# Patient Record
Sex: FEMALE | Race: WHITE | NOT HISPANIC OR LATINO | Employment: UNEMPLOYED | URBAN - METROPOLITAN AREA
[De-identification: names, ages, dates, MRNs, and addresses within clinical notes are randomized per-mention and may not be internally consistent; named-entity substitution may affect disease eponyms.]

---

## 2017-01-08 ENCOUNTER — HOSPITAL ENCOUNTER (EMERGENCY)
Facility: HOSPITAL | Age: 22
Discharge: HOME | End: 2017-01-08
Attending: EMERGENCY MEDICINE | Admitting: EMERGENCY MEDICINE
Payer: COMMERCIAL

## 2017-01-08 VITALS
TEMPERATURE: 97.1 F | SYSTOLIC BLOOD PRESSURE: 123 MMHG | HEART RATE: 79 BPM | RESPIRATION RATE: 18 BRPM | WEIGHT: 170 LBS | OXYGEN SATURATION: 96 % | DIASTOLIC BLOOD PRESSURE: 68 MMHG

## 2017-01-08 DIAGNOSIS — R10.11 RUQ ABDOMINAL PAIN: Primary | ICD-10-CM

## 2017-01-08 DIAGNOSIS — K80.50 BILIARY COLIC: ICD-10-CM

## 2017-01-08 DIAGNOSIS — K80.20 CALCULUS OF GALLBLADDER WITHOUT CHOLECYSTITIS WITHOUT OBSTRUCTION: ICD-10-CM

## 2017-01-08 LAB
ALBUMIN SERPL BCP-MCNC: 3.4 G/DL (ref 3.5–5)
ALP SERPL-CCNC: 74 U/L (ref 46–116)
ALT SERPL W P-5'-P-CCNC: 37 U/L (ref 12–78)
ANION GAP SERPL CALCULATED.3IONS-SCNC: 7 MMOL/L (ref 4–13)
AST SERPL W P-5'-P-CCNC: 26 U/L (ref 5–45)
BACTERIA UR QL AUTO: ABNORMAL /HPF
BASOPHILS # BLD AUTO: 0.03 THOUSANDS/ΜL (ref 0–0.1)
BASOPHILS NFR BLD AUTO: 0 % (ref 0–1)
BILIRUB SERPL-MCNC: 0.23 MG/DL (ref 0.2–1)
BILIRUB UR QL STRIP: NEGATIVE
BUN SERPL-MCNC: 18 MG/DL (ref 5–25)
CALCIUM SERPL-MCNC: 8.7 MG/DL (ref 8.3–10.1)
CHLORIDE SERPL-SCNC: 103 MMOL/L (ref 100–108)
CLARITY UR: ABNORMAL
CO2 SERPL-SCNC: 28 MMOL/L (ref 21–32)
COLOR UR: YELLOW
COLOR, POC: NORMAL
CREAT SERPL-MCNC: 0.68 MG/DL (ref 0.6–1.3)
EOSINOPHIL # BLD AUTO: 0.23 THOUSAND/ΜL (ref 0–0.61)
EOSINOPHIL NFR BLD AUTO: 3 % (ref 0–6)
ERYTHROCYTE [DISTWIDTH] IN BLOOD BY AUTOMATED COUNT: 12.2 % (ref 11.6–15.1)
GFR SERPL CREATININE-BSD FRML MDRD: >60 ML/MIN/1.73SQ M
GLUCOSE SERPL-MCNC: 101 MG/DL (ref 65–140)
GLUCOSE UR STRIP-MCNC: NEGATIVE MG/DL
HCG UR QL: NEGATIVE
HCT VFR BLD AUTO: 41.8 % (ref 34.8–46.1)
HGB BLD-MCNC: 14.2 G/DL (ref 11.5–15.4)
HGB UR QL STRIP.AUTO: ABNORMAL
KETONES UR STRIP-MCNC: NEGATIVE MG/DL
LEUKOCYTE ESTERASE UR QL STRIP: NEGATIVE
LIPASE SERPL-CCNC: 121 U/L (ref 73–393)
LYMPHOCYTES # BLD AUTO: 1.5 THOUSANDS/ΜL (ref 0.6–4.47)
LYMPHOCYTES NFR BLD AUTO: 20 % (ref 14–44)
MCH RBC QN AUTO: 29.3 PG (ref 26.8–34.3)
MCHC RBC AUTO-ENTMCNC: 34 G/DL (ref 31.4–37.4)
MCV RBC AUTO: 86 FL (ref 82–98)
MONOCYTES # BLD AUTO: 0.38 THOUSAND/ΜL (ref 0.17–1.22)
MONOCYTES NFR BLD AUTO: 5 % (ref 4–12)
NEUTROPHILS # BLD AUTO: 5.32 THOUSANDS/ΜL (ref 1.85–7.62)
NEUTS SEG NFR BLD AUTO: 72 % (ref 43–75)
NITRITE UR QL STRIP: NEGATIVE
NON-SQ EPI CELLS URNS QL MICRO: ABNORMAL /HPF
NRBC BLD AUTO-RTO: 0 /100 WBCS
PH UR STRIP.AUTO: 7 [PH] (ref 4.5–8)
PLATELET # BLD AUTO: 274 THOUSANDS/UL (ref 149–390)
PMV BLD AUTO: 9.8 FL (ref 8.9–12.7)
POTASSIUM SERPL-SCNC: 3.7 MMOL/L (ref 3.5–5.3)
PROT SERPL-MCNC: 7.2 G/DL (ref 6.4–8.2)
PROT UR STRIP-MCNC: NEGATIVE MG/DL
RBC # BLD AUTO: 4.85 MILLION/UL (ref 3.81–5.12)
RBC #/AREA URNS AUTO: ABNORMAL /HPF
SODIUM SERPL-SCNC: 138 MMOL/L (ref 136–145)
SP GR UR STRIP.AUTO: 1.02 (ref 1–1.03)
UROBILINOGEN UR QL STRIP.AUTO: 0.2 E.U./DL
WBC # BLD AUTO: 7.47 THOUSAND/UL (ref 4.31–10.16)
WBC #/AREA URNS AUTO: ABNORMAL /HPF

## 2017-01-08 PROCEDURE — 87086 URINE CULTURE/COLONY COUNT: CPT

## 2017-01-08 PROCEDURE — 80053 COMPREHEN METABOLIC PANEL: CPT | Performed by: EMERGENCY MEDICINE

## 2017-01-08 PROCEDURE — 96374 THER/PROPH/DIAG INJ IV PUSH: CPT

## 2017-01-08 PROCEDURE — 83690 ASSAY OF LIPASE: CPT | Performed by: EMERGENCY MEDICINE

## 2017-01-08 PROCEDURE — 36415 COLL VENOUS BLD VENIPUNCTURE: CPT | Performed by: EMERGENCY MEDICINE

## 2017-01-08 PROCEDURE — 96361 HYDRATE IV INFUSION ADD-ON: CPT

## 2017-01-08 PROCEDURE — 99284 EMERGENCY DEPT VISIT MOD MDM: CPT

## 2017-01-08 PROCEDURE — 81025 URINE PREGNANCY TEST: CPT | Performed by: EMERGENCY MEDICINE

## 2017-01-08 PROCEDURE — 85025 COMPLETE CBC W/AUTO DIFF WBC: CPT | Performed by: EMERGENCY MEDICINE

## 2017-01-08 PROCEDURE — 81001 URINALYSIS AUTO W/SCOPE: CPT

## 2017-01-08 PROCEDURE — 81002 URINALYSIS NONAUTO W/O SCOPE: CPT | Performed by: EMERGENCY MEDICINE

## 2017-01-08 PROCEDURE — 96375 TX/PRO/DX INJ NEW DRUG ADDON: CPT

## 2017-01-08 RX ORDER — KETOROLAC TROMETHAMINE 30 MG/ML
15 INJECTION, SOLUTION INTRAMUSCULAR; INTRAVENOUS ONCE
Status: COMPLETED | OUTPATIENT
Start: 2017-01-08 | End: 2017-01-08

## 2017-01-08 RX ORDER — OXYCODONE HYDROCHLORIDE AND ACETAMINOPHEN 5; 325 MG/1; MG/1
1 TABLET ORAL EVERY 4 HOURS PRN
Qty: 10 TABLET | Refills: 0 | Status: SHIPPED | OUTPATIENT
Start: 2017-01-08 | End: 2017-08-30 | Stop reason: ALTCHOICE

## 2017-01-08 RX ORDER — ONDANSETRON 4 MG/1
4 TABLET, FILM COATED ORAL EVERY 8 HOURS PRN
Qty: 20 TABLET | Refills: 0 | Status: SHIPPED | OUTPATIENT
Start: 2017-01-08 | End: 2017-08-30 | Stop reason: ALTCHOICE

## 2017-01-08 RX ORDER — ONDANSETRON 2 MG/ML
4 INJECTION INTRAMUSCULAR; INTRAVENOUS ONCE
Status: COMPLETED | OUTPATIENT
Start: 2017-01-08 | End: 2017-01-08

## 2017-01-08 RX ADMIN — KETOROLAC TROMETHAMINE 15 MG: 30 INJECTION, SOLUTION INTRAMUSCULAR at 03:20

## 2017-01-08 RX ADMIN — SODIUM CHLORIDE 1000 ML: 0.9 INJECTION, SOLUTION INTRAVENOUS at 03:13

## 2017-01-08 RX ADMIN — ONDANSETRON 4 MG: 2 INJECTION INTRAMUSCULAR; INTRAVENOUS at 03:19

## 2017-01-09 LAB — BACTERIA UR CULT: NORMAL

## 2017-08-15 ENCOUNTER — APPOINTMENT (OUTPATIENT)
Dept: RADIOLOGY | Facility: MEDICAL CENTER | Age: 22
End: 2017-08-15
Attending: PHYSICIAN ASSISTANT
Payer: COMMERCIAL

## 2017-08-15 ENCOUNTER — OFFICE VISIT (OUTPATIENT)
Dept: URGENT CARE | Facility: MEDICAL CENTER | Age: 22
End: 2017-08-15
Payer: COMMERCIAL

## 2017-08-15 DIAGNOSIS — M79.672 PAIN OF LEFT FOOT: ICD-10-CM

## 2017-08-15 PROCEDURE — 99213 OFFICE O/P EST LOW 20 MIN: CPT

## 2017-08-15 PROCEDURE — 73610 X-RAY EXAM OF ANKLE: CPT

## 2017-08-15 PROCEDURE — 73630 X-RAY EXAM OF FOOT: CPT

## 2017-08-30 ENCOUNTER — HOSPITAL ENCOUNTER (INPATIENT)
Facility: HOSPITAL | Age: 22
LOS: 1 days | Discharge: HOME | DRG: 419 | End: 2017-08-31
Attending: EMERGENCY MEDICINE | Admitting: SURGERY
Payer: COMMERCIAL

## 2017-08-30 ENCOUNTER — APPOINTMENT (INPATIENT)
Dept: MRI IMAGING | Facility: HOSPITAL | Age: 22
DRG: 419 | End: 2017-08-30
Payer: COMMERCIAL

## 2017-08-30 ENCOUNTER — ANESTHESIA EVENT (INPATIENT)
Dept: PERIOP | Facility: HOSPITAL | Age: 22
DRG: 419 | End: 2017-08-30
Payer: COMMERCIAL

## 2017-08-30 ENCOUNTER — ANESTHESIA (INPATIENT)
Dept: PERIOP | Facility: HOSPITAL | Age: 22
DRG: 419 | End: 2017-08-30
Payer: COMMERCIAL

## 2017-08-30 ENCOUNTER — APPOINTMENT (EMERGENCY)
Dept: ULTRASOUND IMAGING | Facility: HOSPITAL | Age: 22
DRG: 419 | End: 2017-08-30
Payer: COMMERCIAL

## 2017-08-30 DIAGNOSIS — K80.70 CHOLELITHIASIS WITH CHOLEDOCHOLITHIASIS: Primary | ICD-10-CM

## 2017-08-30 LAB
ALBUMIN SERPL BCP-MCNC: 3.4 G/DL (ref 3.5–5)
ALP SERPL-CCNC: 75 U/L (ref 46–116)
ALT SERPL W P-5'-P-CCNC: 44 U/L (ref 12–78)
AMORPH URATE CRY URNS QL MICRO: ABNORMAL /HPF
ANION GAP SERPL CALCULATED.3IONS-SCNC: 9 MMOL/L (ref 4–13)
AST SERPL W P-5'-P-CCNC: 29 U/L (ref 5–45)
BACTERIA UR QL AUTO: ABNORMAL /HPF
BASOPHILS # BLD AUTO: 0.05 THOUSANDS/ΜL (ref 0–0.1)
BASOPHILS NFR BLD AUTO: 1 % (ref 0–1)
BILIRUB SERPL-MCNC: 0.3 MG/DL (ref 0.2–1)
BILIRUB UR QL STRIP: NEGATIVE
BUN SERPL-MCNC: 13 MG/DL (ref 5–25)
CALCIUM SERPL-MCNC: 8.6 MG/DL (ref 8.3–10.1)
CHLORIDE SERPL-SCNC: 104 MMOL/L (ref 100–108)
CLARITY UR: ABNORMAL
CO2 SERPL-SCNC: 28 MMOL/L (ref 21–32)
COLOR UR: YELLOW
CREAT SERPL-MCNC: 0.79 MG/DL (ref 0.6–1.3)
EOSINOPHIL # BLD AUTO: 0.29 THOUSAND/ΜL (ref 0–0.61)
EOSINOPHIL NFR BLD AUTO: 6 % (ref 0–6)
ERYTHROCYTE [DISTWIDTH] IN BLOOD BY AUTOMATED COUNT: 12.7 % (ref 11.6–15.1)
GFR SERPL CREATININE-BSD FRML MDRD: 107 ML/MIN/1.73SQ M
GLUCOSE SERPL-MCNC: 103 MG/DL (ref 65–140)
GLUCOSE UR STRIP-MCNC: NEGATIVE MG/DL
HCG UR QL: NEGATIVE
HCT VFR BLD AUTO: 42.6 % (ref 34.8–46.1)
HGB BLD-MCNC: 14.4 G/DL (ref 11.5–15.4)
HGB UR QL STRIP.AUTO: ABNORMAL
KETONES UR STRIP-MCNC: ABNORMAL MG/DL
LEUKOCYTE ESTERASE UR QL STRIP: NEGATIVE
LIPASE SERPL-CCNC: 112 U/L (ref 73–393)
LYMPHOCYTES # BLD AUTO: 2 THOUSANDS/ΜL (ref 0.6–4.47)
LYMPHOCYTES NFR BLD AUTO: 39 % (ref 14–44)
MCH RBC QN AUTO: 29.1 PG (ref 26.8–34.3)
MCHC RBC AUTO-ENTMCNC: 33.8 G/DL (ref 31.4–37.4)
MCV RBC AUTO: 86 FL (ref 82–98)
MONOCYTES # BLD AUTO: 0.41 THOUSAND/ΜL (ref 0.17–1.22)
MONOCYTES NFR BLD AUTO: 8 % (ref 4–12)
NEUTROPHILS # BLD AUTO: 2.45 THOUSANDS/ΜL (ref 1.85–7.62)
NEUTS SEG NFR BLD AUTO: 46 % (ref 43–75)
NITRITE UR QL STRIP: NEGATIVE
NON-SQ EPI CELLS URNS QL MICRO: ABNORMAL /HPF
PH UR STRIP.AUTO: 7 [PH] (ref 4.5–8)
PLATELET # BLD AUTO: 249 THOUSANDS/UL (ref 149–390)
PMV BLD AUTO: 10 FL (ref 8.9–12.7)
POTASSIUM SERPL-SCNC: 3.9 MMOL/L (ref 3.5–5.3)
PROT SERPL-MCNC: 7.3 G/DL (ref 6.4–8.2)
PROT UR STRIP-MCNC: NEGATIVE MG/DL
RBC # BLD AUTO: 4.95 MILLION/UL (ref 3.81–5.12)
RBC #/AREA URNS AUTO: ABNORMAL /HPF
SODIUM SERPL-SCNC: 141 MMOL/L (ref 136–145)
SP GR UR STRIP.AUTO: 1.02 (ref 1–1.03)
UROBILINOGEN UR QL STRIP.AUTO: 0.2 E.U./DL
WBC # BLD AUTO: 5.2 THOUSAND/UL (ref 4.31–10.16)
WBC #/AREA URNS AUTO: ABNORMAL /HPF

## 2017-08-30 PROCEDURE — 76705 ECHO EXAM OF ABDOMEN: CPT

## 2017-08-30 PROCEDURE — 0FT44ZZ RESECTION OF GALLBLADDER, PERCUTANEOUS ENDOSCOPIC APPROACH: ICD-10-PCS | Performed by: SURGERY

## 2017-08-30 PROCEDURE — 83690 ASSAY OF LIPASE: CPT | Performed by: EMERGENCY MEDICINE

## 2017-08-30 PROCEDURE — 81025 URINE PREGNANCY TEST: CPT | Performed by: EMERGENCY MEDICINE

## 2017-08-30 PROCEDURE — 76377 3D RENDER W/INTRP POSTPROCES: CPT

## 2017-08-30 PROCEDURE — 36415 COLL VENOUS BLD VENIPUNCTURE: CPT | Performed by: EMERGENCY MEDICINE

## 2017-08-30 PROCEDURE — 74181 MRI ABDOMEN W/O CONTRAST: CPT

## 2017-08-30 PROCEDURE — 85025 COMPLETE CBC W/AUTO DIFF WBC: CPT | Performed by: EMERGENCY MEDICINE

## 2017-08-30 PROCEDURE — 80053 COMPREHEN METABOLIC PANEL: CPT | Performed by: EMERGENCY MEDICINE

## 2017-08-30 PROCEDURE — 88304 TISSUE EXAM BY PATHOLOGIST: CPT | Performed by: SURGERY

## 2017-08-30 PROCEDURE — 81001 URINALYSIS AUTO W/SCOPE: CPT | Performed by: EMERGENCY MEDICINE

## 2017-08-30 PROCEDURE — 96374 THER/PROPH/DIAG INJ IV PUSH: CPT

## 2017-08-30 PROCEDURE — 99285 EMERGENCY DEPT VISIT HI MDM: CPT

## 2017-08-30 RX ORDER — SODIUM CHLORIDE 9 MG/ML
75 INJECTION, SOLUTION INTRAVENOUS CONTINUOUS
Status: DISCONTINUED | OUTPATIENT
Start: 2017-08-30 | End: 2017-08-31 | Stop reason: HOSPADM

## 2017-08-30 RX ORDER — FENTANYL CITRATE 50 UG/ML
INJECTION, SOLUTION INTRAMUSCULAR; INTRAVENOUS AS NEEDED
Status: DISCONTINUED | OUTPATIENT
Start: 2017-08-30 | End: 2017-08-30 | Stop reason: SURG

## 2017-08-30 RX ORDER — SODIUM CHLORIDE 9 MG/ML
INJECTION, SOLUTION INTRAVENOUS AS NEEDED
Status: DISCONTINUED | OUTPATIENT
Start: 2017-08-30 | End: 2017-08-30 | Stop reason: HOSPADM

## 2017-08-30 RX ORDER — SODIUM CHLORIDE, SODIUM LACTATE, POTASSIUM CHLORIDE, CALCIUM CHLORIDE 600; 310; 30; 20 MG/100ML; MG/100ML; MG/100ML; MG/100ML
125 INJECTION, SOLUTION INTRAVENOUS CONTINUOUS
Status: DISCONTINUED | OUTPATIENT
Start: 2017-08-30 | End: 2017-08-30

## 2017-08-30 RX ORDER — ONDANSETRON 2 MG/ML
4 INJECTION INTRAMUSCULAR; INTRAVENOUS ONCE AS NEEDED
Status: DISCONTINUED | OUTPATIENT
Start: 2017-08-30 | End: 2017-08-30 | Stop reason: HOSPADM

## 2017-08-30 RX ORDER — LIDOCAINE HYDROCHLORIDE 10 MG/ML
INJECTION, SOLUTION INFILTRATION; PERINEURAL AS NEEDED
Status: DISCONTINUED | OUTPATIENT
Start: 2017-08-30 | End: 2017-08-30 | Stop reason: SURG

## 2017-08-30 RX ORDER — OXYCODONE HYDROCHLORIDE AND ACETAMINOPHEN 5; 325 MG/1; MG/1
1 TABLET ORAL EVERY 4 HOURS PRN
Status: DISCONTINUED | OUTPATIENT
Start: 2017-08-30 | End: 2017-08-31 | Stop reason: HOSPADM

## 2017-08-30 RX ORDER — ONDANSETRON 2 MG/ML
4 INJECTION INTRAMUSCULAR; INTRAVENOUS ONCE AS NEEDED
Status: DISCONTINUED | OUTPATIENT
Start: 2017-08-30 | End: 2017-08-31 | Stop reason: HOSPADM

## 2017-08-30 RX ORDER — GLYCOPYRROLATE 0.2 MG/ML
INJECTION INTRAMUSCULAR; INTRAVENOUS AS NEEDED
Status: DISCONTINUED | OUTPATIENT
Start: 2017-08-30 | End: 2017-08-30 | Stop reason: SURG

## 2017-08-30 RX ORDER — FENTANYL CITRATE/PF 50 MCG/ML
25 SYRINGE (ML) INJECTION
Status: DISCONTINUED | OUTPATIENT
Start: 2017-08-30 | End: 2017-08-31 | Stop reason: HOSPADM

## 2017-08-30 RX ORDER — PROPOFOL 10 MG/ML
INJECTION, EMULSION INTRAVENOUS AS NEEDED
Status: DISCONTINUED | OUTPATIENT
Start: 2017-08-30 | End: 2017-08-30 | Stop reason: SURG

## 2017-08-30 RX ORDER — BUPIVACAINE HYDROCHLORIDE AND EPINEPHRINE 2.5; 5 MG/ML; UG/ML
INJECTION, SOLUTION EPIDURAL; INFILTRATION; INTRACAUDAL; PERINEURAL AS NEEDED
Status: DISCONTINUED | OUTPATIENT
Start: 2017-08-30 | End: 2017-08-30 | Stop reason: HOSPADM

## 2017-08-30 RX ORDER — ESMOLOL HYDROCHLORIDE 10 MG/ML
INJECTION INTRAVENOUS AS NEEDED
Status: DISCONTINUED | OUTPATIENT
Start: 2017-08-30 | End: 2017-08-30 | Stop reason: SURG

## 2017-08-30 RX ORDER — ROCURONIUM BROMIDE 10 MG/ML
INJECTION, SOLUTION INTRAVENOUS AS NEEDED
Status: DISCONTINUED | OUTPATIENT
Start: 2017-08-30 | End: 2017-08-30 | Stop reason: SURG

## 2017-08-30 RX ORDER — MEPERIDINE HYDROCHLORIDE 25 MG/ML
12.5 INJECTION INTRAMUSCULAR; INTRAVENOUS; SUBCUTANEOUS AS NEEDED
Status: DISCONTINUED | OUTPATIENT
Start: 2017-08-30 | End: 2017-08-31 | Stop reason: HOSPADM

## 2017-08-30 RX ORDER — FENTANYL CITRATE/PF 50 MCG/ML
25 SYRINGE (ML) INJECTION
Status: DISCONTINUED | OUTPATIENT
Start: 2017-08-30 | End: 2017-08-30 | Stop reason: HOSPADM

## 2017-08-30 RX ORDER — ONDANSETRON 2 MG/ML
INJECTION INTRAMUSCULAR; INTRAVENOUS AS NEEDED
Status: DISCONTINUED | OUTPATIENT
Start: 2017-08-30 | End: 2017-08-30 | Stop reason: SURG

## 2017-08-30 RX ORDER — KETOROLAC TROMETHAMINE 30 MG/ML
INJECTION, SOLUTION INTRAMUSCULAR; INTRAVENOUS AS NEEDED
Status: DISCONTINUED | OUTPATIENT
Start: 2017-08-30 | End: 2017-08-30 | Stop reason: SURG

## 2017-08-30 RX ORDER — MIDAZOLAM HYDROCHLORIDE 1 MG/ML
INJECTION INTRAMUSCULAR; INTRAVENOUS AS NEEDED
Status: DISCONTINUED | OUTPATIENT
Start: 2017-08-30 | End: 2017-08-30 | Stop reason: SURG

## 2017-08-30 RX ORDER — ONDANSETRON 2 MG/ML
4 INJECTION INTRAMUSCULAR; INTRAVENOUS EVERY 4 HOURS PRN
Status: DISCONTINUED | OUTPATIENT
Start: 2017-08-30 | End: 2017-08-31 | Stop reason: HOSPADM

## 2017-08-30 RX ORDER — KETOROLAC TROMETHAMINE 30 MG/ML
30 INJECTION, SOLUTION INTRAMUSCULAR; INTRAVENOUS ONCE
Status: COMPLETED | OUTPATIENT
Start: 2017-08-30 | End: 2017-08-30

## 2017-08-30 RX ORDER — ALBUTEROL SULFATE 2.5 MG/3ML
2.5 SOLUTION RESPIRATORY (INHALATION) ONCE AS NEEDED
Status: DISCONTINUED | OUTPATIENT
Start: 2017-08-30 | End: 2017-08-31 | Stop reason: HOSPADM

## 2017-08-30 RX ORDER — CEFAZOLIN SODIUM 1 G/3ML
INJECTION, POWDER, FOR SOLUTION INTRAMUSCULAR; INTRAVENOUS AS NEEDED
Status: DISCONTINUED | OUTPATIENT
Start: 2017-08-30 | End: 2017-08-30 | Stop reason: SURG

## 2017-08-30 RX ADMIN — SODIUM CHLORIDE 125 ML/HR: 0.9 INJECTION, SOLUTION INTRAVENOUS at 13:35

## 2017-08-30 RX ADMIN — ROCURONIUM BROMIDE 30 MG: 10 INJECTION, SOLUTION INTRAVENOUS at 14:35

## 2017-08-30 RX ADMIN — GLYCOPYRROLATE 0.6 MG: 0.2 INJECTION, SOLUTION INTRAMUSCULAR; INTRAVENOUS at 15:08

## 2017-08-30 RX ADMIN — FENTANYL CITRATE 25 MCG: 50 INJECTION INTRAMUSCULAR; INTRAVENOUS at 15:39

## 2017-08-30 RX ADMIN — KETOROLAC TROMETHAMINE 30 MG: 30 INJECTION, SOLUTION INTRAMUSCULAR at 01:54

## 2017-08-30 RX ADMIN — FENTANYL CITRATE 25 MCG: 50 INJECTION INTRAMUSCULAR; INTRAVENOUS at 15:45

## 2017-08-30 RX ADMIN — ESMOLOL HYDROCHLORIDE 40 MG: 10 INJECTION, SOLUTION INTRAVENOUS at 14:52

## 2017-08-30 RX ADMIN — FENTANYL CITRATE 100 MCG: 50 INJECTION INTRAMUSCULAR; INTRAVENOUS at 14:35

## 2017-08-30 RX ADMIN — DEXAMETHASONE SODIUM PHOSPHATE 10 MG: 10 INJECTION INTRAMUSCULAR; INTRAVENOUS at 14:45

## 2017-08-30 RX ADMIN — CEFAZOLIN SODIUM 2000 MG: 1 INJECTION, POWDER, FOR SOLUTION INTRAMUSCULAR; INTRAVENOUS at 14:44

## 2017-08-30 RX ADMIN — ONDANSETRON 4 MG: 2 INJECTION INTRAMUSCULAR; INTRAVENOUS at 14:45

## 2017-08-30 RX ADMIN — ENOXAPARIN SODIUM 30 MG: 30 INJECTION SUBCUTANEOUS at 09:32

## 2017-08-30 RX ADMIN — MIDAZOLAM HYDROCHLORIDE 2 MG: 1 INJECTION, SOLUTION INTRAMUSCULAR; INTRAVENOUS at 14:29

## 2017-08-30 RX ADMIN — PROPOFOL 200 MG: 10 INJECTION, EMULSION INTRAVENOUS at 14:35

## 2017-08-30 RX ADMIN — KETOROLAC TROMETHAMINE 30 MG: 30 INJECTION, SOLUTION INTRAMUSCULAR at 15:00

## 2017-08-30 RX ADMIN — OXYCODONE HYDROCHLORIDE AND ACETAMINOPHEN 1 TABLET: 5; 325 TABLET ORAL at 20:30

## 2017-08-30 RX ADMIN — SODIUM CHLORIDE, SODIUM LACTATE, POTASSIUM CHLORIDE, AND CALCIUM CHLORIDE: .6; .31; .03; .02 INJECTION, SOLUTION INTRAVENOUS at 14:10

## 2017-08-30 RX ADMIN — SODIUM CHLORIDE 125 ML/HR: 0.9 INJECTION, SOLUTION INTRAVENOUS at 04:34

## 2017-08-30 RX ADMIN — NEOSTIGMINE METHYLSULFATE 4 MG: 1 INJECTION, SOLUTION INTRAMUSCULAR; INTRAVENOUS; SUBCUTANEOUS at 15:08

## 2017-08-30 RX ADMIN — LIDOCAINE HYDROCHLORIDE 50 MG: 10 INJECTION, SOLUTION INFILTRATION; PERINEURAL at 14:35

## 2017-08-31 VITALS
SYSTOLIC BLOOD PRESSURE: 120 MMHG | DIASTOLIC BLOOD PRESSURE: 68 MMHG | WEIGHT: 188 LBS | RESPIRATION RATE: 18 BRPM | BODY MASS INDEX: 31.32 KG/M2 | HEIGHT: 65 IN | HEART RATE: 65 BPM | TEMPERATURE: 97.9 F | OXYGEN SATURATION: 98 %

## 2017-08-31 LAB
ALBUMIN SERPL BCP-MCNC: 2.8 G/DL (ref 3.5–5)
ALP SERPL-CCNC: 77 U/L (ref 46–116)
ALT SERPL W P-5'-P-CCNC: 176 U/L (ref 12–78)
ANION GAP SERPL CALCULATED.3IONS-SCNC: 8 MMOL/L (ref 4–13)
AST SERPL W P-5'-P-CCNC: 74 U/L (ref 5–45)
BILIRUB SERPL-MCNC: 0.4 MG/DL (ref 0.2–1)
BUN SERPL-MCNC: 9 MG/DL (ref 5–25)
CALCIUM SERPL-MCNC: 8.7 MG/DL (ref 8.3–10.1)
CHLORIDE SERPL-SCNC: 108 MMOL/L (ref 100–108)
CO2 SERPL-SCNC: 21 MMOL/L (ref 21–32)
CREAT SERPL-MCNC: 0.59 MG/DL (ref 0.6–1.3)
ERYTHROCYTE [DISTWIDTH] IN BLOOD BY AUTOMATED COUNT: 13 % (ref 11.6–15.1)
GFR SERPL CREATININE-BSD FRML MDRD: 131 ML/MIN/1.73SQ M
GLUCOSE SERPL-MCNC: 128 MG/DL (ref 65–140)
HCT VFR BLD AUTO: 43.1 % (ref 34.8–46.1)
HGB BLD-MCNC: 14 G/DL (ref 11.5–15.4)
MCH RBC QN AUTO: 28.6 PG (ref 26.8–34.3)
MCHC RBC AUTO-ENTMCNC: 32.5 G/DL (ref 31.4–37.4)
MCV RBC AUTO: 88 FL (ref 82–98)
PLATELET # BLD AUTO: 257 THOUSANDS/UL (ref 149–390)
PMV BLD AUTO: 9.9 FL (ref 8.9–12.7)
POTASSIUM SERPL-SCNC: 4.4 MMOL/L (ref 3.5–5.3)
PROT SERPL-MCNC: 6.8 G/DL (ref 6.4–8.2)
RBC # BLD AUTO: 4.89 MILLION/UL (ref 3.81–5.12)
SODIUM SERPL-SCNC: 137 MMOL/L (ref 136–145)
WBC # BLD AUTO: 8.69 THOUSAND/UL (ref 4.31–10.16)

## 2017-08-31 PROCEDURE — 80053 COMPREHEN METABOLIC PANEL: CPT | Performed by: PHYSICIAN ASSISTANT

## 2017-08-31 PROCEDURE — 85027 COMPLETE CBC AUTOMATED: CPT | Performed by: PHYSICIAN ASSISTANT

## 2017-08-31 RX ORDER — OXYCODONE HYDROCHLORIDE AND ACETAMINOPHEN 5; 325 MG/1; MG/1
1 TABLET ORAL EVERY 4 HOURS PRN
Qty: 10 TABLET | Refills: 0 | Status: SHIPPED | OUTPATIENT
Start: 2017-08-31 | End: 2017-09-10

## 2017-08-31 RX ADMIN — SODIUM CHLORIDE 75 ML/HR: 0.9 INJECTION, SOLUTION INTRAVENOUS at 05:15

## 2017-08-31 RX ADMIN — OXYCODONE HYDROCHLORIDE AND ACETAMINOPHEN 1 TABLET: 5; 325 TABLET ORAL at 05:13

## 2017-08-31 RX ADMIN — ENOXAPARIN SODIUM 30 MG: 30 INJECTION SUBCUTANEOUS at 09:10

## 2017-09-05 ENCOUNTER — TRANSCRIBE ORDERS (OUTPATIENT)
Dept: ADMINISTRATIVE | Facility: HOSPITAL | Age: 22
End: 2017-09-05

## 2017-09-05 ENCOUNTER — HOSPITAL ENCOUNTER (EMERGENCY)
Facility: HOSPITAL | Age: 22
Discharge: HOME | End: 2017-09-05
Attending: EMERGENCY MEDICINE | Admitting: EMERGENCY MEDICINE
Payer: COMMERCIAL

## 2017-09-05 VITALS
TEMPERATURE: 98.5 F | WEIGHT: 179 LBS | HEART RATE: 62 BPM | RESPIRATION RATE: 18 BRPM | SYSTOLIC BLOOD PRESSURE: 117 MMHG | DIASTOLIC BLOOD PRESSURE: 62 MMHG | OXYGEN SATURATION: 100 % | BODY MASS INDEX: 29.79 KG/M2

## 2017-09-05 DIAGNOSIS — R10.9 ABDOMINAL PAIN: Primary | ICD-10-CM

## 2017-09-05 DIAGNOSIS — G89.18 POST-OP PAIN: ICD-10-CM

## 2017-09-05 LAB
ALBUMIN SERPL BCP-MCNC: 3.7 G/DL (ref 3.5–5)
ALP SERPL-CCNC: 86 U/L (ref 46–116)
ALT SERPL W P-5'-P-CCNC: 77 U/L (ref 12–78)
ANION GAP SERPL CALCULATED.3IONS-SCNC: 5 MMOL/L (ref 4–13)
AST SERPL W P-5'-P-CCNC: 17 U/L (ref 5–45)
BASOPHILS # BLD AUTO: 0.02 THOUSANDS/ΜL (ref 0–0.1)
BASOPHILS NFR BLD AUTO: 0 % (ref 0–1)
BILIRUB SERPL-MCNC: 0.3 MG/DL (ref 0.2–1)
BUN SERPL-MCNC: 12 MG/DL (ref 5–25)
CALCIUM SERPL-MCNC: 8.9 MG/DL (ref 8.3–10.1)
CHLORIDE SERPL-SCNC: 102 MMOL/L (ref 100–108)
CLARITY, POC: NORMAL
CO2 SERPL-SCNC: 31 MMOL/L (ref 21–32)
COLOR, POC: YELLOW
CREAT SERPL-MCNC: 0.8 MG/DL (ref 0.6–1.3)
EOSINOPHIL # BLD AUTO: 0.1 THOUSAND/ΜL (ref 0–0.61)
EOSINOPHIL NFR BLD AUTO: 1 % (ref 0–6)
ERYTHROCYTE [DISTWIDTH] IN BLOOD BY AUTOMATED COUNT: 12.4 % (ref 11.6–15.1)
EXT BILIRUBIN, UA: NEGATIVE
EXT BLOOD URINE: NEGATIVE
EXT GLUCOSE, UA: NEGATIVE
EXT KETONES: NEGATIVE
EXT NITRITE, UA: NEGATIVE
EXT PH, UA: 7.5
EXT PROTEIN, UA: NORMAL
EXT SPECIFIC GRAVITY, UA: 1.01
EXT UROBILINOGEN: 0.2
GFR SERPL CREATININE-BSD FRML MDRD: 105 ML/MIN/1.73SQ M
GLUCOSE SERPL-MCNC: 101 MG/DL (ref 65–140)
HCG UR QL: NEGATIVE
HCT VFR BLD AUTO: 43.8 % (ref 34.8–46.1)
HGB BLD-MCNC: 14.6 G/DL (ref 11.5–15.4)
LYMPHOCYTES # BLD AUTO: 1.06 THOUSANDS/ΜL (ref 0.6–4.47)
LYMPHOCYTES NFR BLD AUTO: 15 % (ref 14–44)
MCH RBC QN AUTO: 28.7 PG (ref 26.8–34.3)
MCHC RBC AUTO-ENTMCNC: 33.3 G/DL (ref 31.4–37.4)
MCV RBC AUTO: 86 FL (ref 82–98)
MONOCYTES # BLD AUTO: 0.42 THOUSAND/ΜL (ref 0.17–1.22)
MONOCYTES NFR BLD AUTO: 6 % (ref 4–12)
NEUTROPHILS # BLD AUTO: 5.47 THOUSANDS/ΜL (ref 1.85–7.62)
NEUTS SEG NFR BLD AUTO: 78 % (ref 43–75)
PLATELET # BLD AUTO: 284 THOUSANDS/UL (ref 149–390)
PMV BLD AUTO: 9.6 FL (ref 8.9–12.7)
POTASSIUM SERPL-SCNC: 3.8 MMOL/L (ref 3.5–5.3)
PROT SERPL-MCNC: 7.8 G/DL (ref 6.4–8.2)
RBC # BLD AUTO: 5.09 MILLION/UL (ref 3.81–5.12)
SODIUM SERPL-SCNC: 138 MMOL/L (ref 136–145)
WBC # BLD AUTO: 7.07 THOUSAND/UL (ref 4.31–10.16)
WBC # BLD EST: NEGATIVE 10*3/UL

## 2017-09-05 PROCEDURE — 81002 URINALYSIS NONAUTO W/O SCOPE: CPT | Performed by: EMERGENCY MEDICINE

## 2017-09-05 PROCEDURE — 99283 EMERGENCY DEPT VISIT LOW MDM: CPT

## 2017-09-05 PROCEDURE — 80053 COMPREHEN METABOLIC PANEL: CPT | Performed by: EMERGENCY MEDICINE

## 2017-09-05 PROCEDURE — 85025 COMPLETE CBC W/AUTO DIFF WBC: CPT | Performed by: EMERGENCY MEDICINE

## 2017-09-05 PROCEDURE — 96374 THER/PROPH/DIAG INJ IV PUSH: CPT

## 2017-09-05 PROCEDURE — 96375 TX/PRO/DX INJ NEW DRUG ADDON: CPT

## 2017-09-05 PROCEDURE — 36415 COLL VENOUS BLD VENIPUNCTURE: CPT | Performed by: EMERGENCY MEDICINE

## 2017-09-05 PROCEDURE — 81025 URINE PREGNANCY TEST: CPT | Performed by: EMERGENCY MEDICINE

## 2017-09-05 PROCEDURE — 96361 HYDRATE IV INFUSION ADD-ON: CPT

## 2017-09-05 RX ORDER — ONDANSETRON 2 MG/ML
4 INJECTION INTRAMUSCULAR; INTRAVENOUS ONCE
Status: COMPLETED | OUTPATIENT
Start: 2017-09-05 | End: 2017-09-05

## 2017-09-05 RX ORDER — PANTOPRAZOLE SODIUM 20 MG/1
20 TABLET, DELAYED RELEASE ORAL DAILY
Qty: 20 TABLET | Refills: 0 | Status: SHIPPED | OUTPATIENT
Start: 2017-09-05 | End: 2019-05-10 | Stop reason: ALTCHOICE

## 2017-09-05 RX ORDER — MORPHINE SULFATE 4 MG/ML
4 INJECTION, SOLUTION INTRAMUSCULAR; INTRAVENOUS ONCE
Status: COMPLETED | OUTPATIENT
Start: 2017-09-05 | End: 2017-09-05

## 2017-09-05 RX ADMIN — ONDANSETRON 4 MG: 2 INJECTION INTRAMUSCULAR; INTRAVENOUS at 15:08

## 2017-09-05 RX ADMIN — MORPHINE SULFATE 4 MG: 4 INJECTION, SOLUTION INTRAMUSCULAR; INTRAVENOUS at 15:09

## 2017-09-05 RX ADMIN — SODIUM CHLORIDE 1000 ML: 0.9 INJECTION, SOLUTION INTRAVENOUS at 15:08

## 2017-09-06 ENCOUNTER — APPOINTMENT (EMERGENCY)
Dept: CT IMAGING | Facility: HOSPITAL | Age: 22
End: 2017-09-06
Payer: COMMERCIAL

## 2017-09-06 ENCOUNTER — HOSPITAL ENCOUNTER (EMERGENCY)
Facility: HOSPITAL | Age: 22
Discharge: HOME | End: 2017-09-06
Attending: EMERGENCY MEDICINE | Admitting: EMERGENCY MEDICINE
Payer: COMMERCIAL

## 2017-09-06 VITALS
BODY MASS INDEX: 30.82 KG/M2 | DIASTOLIC BLOOD PRESSURE: 69 MMHG | RESPIRATION RATE: 18 BRPM | HEIGHT: 65 IN | SYSTOLIC BLOOD PRESSURE: 118 MMHG | TEMPERATURE: 98.4 F | WEIGHT: 185 LBS | OXYGEN SATURATION: 99 % | HEART RATE: 67 BPM

## 2017-09-06 DIAGNOSIS — K59.00 CONSTIPATION: ICD-10-CM

## 2017-09-06 DIAGNOSIS — G89.18 POST-OPERATIVE PAIN: Primary | ICD-10-CM

## 2017-09-06 PROBLEM — K59.03 CONSTIPATION DUE TO OPIOID THERAPY: Status: ACTIVE | Noted: 2017-09-06

## 2017-09-06 PROBLEM — T40.2X5A CONSTIPATION DUE TO OPIOID THERAPY: Status: ACTIVE | Noted: 2017-09-06

## 2017-09-06 LAB
ALBUMIN SERPL BCP-MCNC: 3.4 G/DL (ref 3.5–5)
ALP SERPL-CCNC: 94 U/L (ref 46–116)
ALT SERPL W P-5'-P-CCNC: 91 U/L (ref 12–78)
ANION GAP SERPL CALCULATED.3IONS-SCNC: 11 MMOL/L (ref 4–13)
AST SERPL W P-5'-P-CCNC: 80 U/L (ref 5–45)
BASOPHILS # BLD AUTO: 0.02 THOUSANDS/ΜL (ref 0–0.1)
BASOPHILS NFR BLD AUTO: 0 % (ref 0–1)
BILIRUB SERPL-MCNC: 0.7 MG/DL (ref 0.2–1)
BUN SERPL-MCNC: 11 MG/DL (ref 5–25)
CALCIUM SERPL-MCNC: 8.6 MG/DL (ref 8.3–10.1)
CHLORIDE SERPL-SCNC: 105 MMOL/L (ref 100–108)
CO2 SERPL-SCNC: 25 MMOL/L (ref 21–32)
CREAT SERPL-MCNC: 0.67 MG/DL (ref 0.6–1.3)
EOSINOPHIL # BLD AUTO: 0.21 THOUSAND/ΜL (ref 0–0.61)
EOSINOPHIL NFR BLD AUTO: 3 % (ref 0–6)
ERYTHROCYTE [DISTWIDTH] IN BLOOD BY AUTOMATED COUNT: 12.4 % (ref 11.6–15.1)
GFR SERPL CREATININE-BSD FRML MDRD: 125 ML/MIN/1.73SQ M
GLUCOSE SERPL-MCNC: 102 MG/DL (ref 65–140)
HCT VFR BLD AUTO: 40.7 % (ref 34.8–46.1)
HGB BLD-MCNC: 13.6 G/DL (ref 11.5–15.4)
LIPASE SERPL-CCNC: 94 U/L (ref 73–393)
LYMPHOCYTES # BLD AUTO: 0.96 THOUSANDS/ΜL (ref 0.6–4.47)
LYMPHOCYTES NFR BLD AUTO: 14 % (ref 14–44)
MCH RBC QN AUTO: 28.8 PG (ref 26.8–34.3)
MCHC RBC AUTO-ENTMCNC: 33.4 G/DL (ref 31.4–37.4)
MCV RBC AUTO: 86 FL (ref 82–98)
MONOCYTES # BLD AUTO: 0.31 THOUSAND/ΜL (ref 0.17–1.22)
MONOCYTES NFR BLD AUTO: 5 % (ref 4–12)
NEUTROPHILS # BLD AUTO: 5.31 THOUSANDS/ΜL (ref 1.85–7.62)
NEUTS SEG NFR BLD AUTO: 78 % (ref 43–75)
PLATELET # BLD AUTO: 251 THOUSANDS/UL (ref 149–390)
PMV BLD AUTO: 9.9 FL (ref 8.9–12.7)
POTASSIUM SERPL-SCNC: 3.6 MMOL/L (ref 3.5–5.3)
PROT SERPL-MCNC: 7.2 G/DL (ref 6.4–8.2)
RBC # BLD AUTO: 4.73 MILLION/UL (ref 3.81–5.12)
SODIUM SERPL-SCNC: 141 MMOL/L (ref 136–145)
WBC # BLD AUTO: 6.81 THOUSAND/UL (ref 4.31–10.16)

## 2017-09-06 PROCEDURE — 99284 EMERGENCY DEPT VISIT MOD MDM: CPT

## 2017-09-06 PROCEDURE — 80053 COMPREHEN METABOLIC PANEL: CPT | Performed by: EMERGENCY MEDICINE

## 2017-09-06 PROCEDURE — 96374 THER/PROPH/DIAG INJ IV PUSH: CPT

## 2017-09-06 PROCEDURE — 74177 CT ABD & PELVIS W/CONTRAST: CPT

## 2017-09-06 PROCEDURE — 36415 COLL VENOUS BLD VENIPUNCTURE: CPT | Performed by: EMERGENCY MEDICINE

## 2017-09-06 PROCEDURE — 96361 HYDRATE IV INFUSION ADD-ON: CPT

## 2017-09-06 PROCEDURE — 85025 COMPLETE CBC W/AUTO DIFF WBC: CPT | Performed by: EMERGENCY MEDICINE

## 2017-09-06 PROCEDURE — 83690 ASSAY OF LIPASE: CPT | Performed by: EMERGENCY MEDICINE

## 2017-09-06 RX ORDER — ACETAMINOPHEN 325 MG/1
650 TABLET ORAL ONCE
Status: COMPLETED | OUTPATIENT
Start: 2017-09-06 | End: 2017-09-06

## 2017-09-06 RX ORDER — ONDANSETRON 2 MG/ML
4 INJECTION INTRAMUSCULAR; INTRAVENOUS ONCE
Status: COMPLETED | OUTPATIENT
Start: 2017-09-06 | End: 2017-09-06

## 2017-09-06 RX ORDER — NORETHINDRONE ACETATE AND ETHINYL ESTRADIOL 1; 5 MG/1; UG/1
TABLET ORAL DAILY
COMMUNITY
End: 2019-05-10 | Stop reason: ALTCHOICE

## 2017-09-06 RX ADMIN — SODIUM CHLORIDE 1000 ML: 0.9 INJECTION, SOLUTION INTRAVENOUS at 14:52

## 2017-09-06 RX ADMIN — ACETAMINOPHEN 650 MG: 325 TABLET ORAL at 18:00

## 2017-09-06 RX ADMIN — IOHEXOL 100 ML: 350 INJECTION, SOLUTION INTRAVENOUS at 15:21

## 2017-09-06 RX ADMIN — ONDANSETRON 4 MG: 2 INJECTION INTRAMUSCULAR; INTRAVENOUS at 15:02

## 2018-01-16 NOTE — MISCELLANEOUS
Message   Recorded as Task   Date: 12/09/2016 03:42 PM, Created By: Mohan Hawthorne   Task Name: Care Coordination   Assigned To: Shirley Spaulding   Regarding Patient: Krishna Wild, Status: In Progress   Comment:    Kaylene Randall - 09 Dec 2016 3:42 PM     TASK CREATED  Caller: Self; Care Coordination; [phone] (Mobile Phone)  Pt called and to ask if she can get another type of birth control perferably not chewable  Also the one that was prescribed is to expensive to pay for every month and will like to know if there is another one that is cheaper  Genevia Frank - 09 Dec 2016 3:44 PM     TASK IN PROGRESS   Genevia Frank - 09 Dec 2016 3:46 PM     TASK EDITED  mailbox not set up  unable to leave message, we will call back on monday  Scarlet Ackerman - 12 Dec 2016 9:22 AM     TASK EDITED  mailbox still not set up, will try again later  Scarlet Ackerman - 12 Dec 2016 9:43 AM     TASK EDITED  Spoke to pt, currently on Minastrin chewables  Would like to go on a different BCP, preferably not chewable and less expensive  Please advise  Thanks! Genia Donaldson - 13 Dec 2016 8:37 AM     TASK EDITED  Pt called to say she needs to have new rx today (12/13) bc she is out of bc  Merrily Nail - 13 Dec 2016 2:18 PM     TASK REPLIED TO: Previously Assigned To Merrily Nail   Patient Rx switched to [facility] 1/20  Please notify her  Scarlet Ackerman - 13 Dec 2016 3:15 PM     TASK EDITED  Pt notified of change of birth control  Active Problems    1  Birth control (V25 9) (Z30 9)   2  Encounter for fetal anatomic survey (V28 81) (Z36)   3  Encounter for initial prescription of contraceptive pills (V25 01) (Z30 011)   4  Encounter for insertion of mirena IUD (V25 11) (Z30 430)   5  Encounter for IUD removal (V25 12) (Z30 432)   6  Encounter for pregnancy related examination (V22 1) (Z34 90)   7  Encounter for routine gynecological examination (V72 31) (Z01 419)   8   Encounter for routine postpartum follow-up (V24 2) (Z39 2)   9  Encounter for screening for risk of pre-term labor (V28 82) (Z36)   10  Female pelvic pain (625 9) (R10 2)   11  Irregular bleeding (626 4) (N92 6)   12  IUD complication (521 03) (I62 8OMG)   13  Need for Tdap vaccination (V06 1) (Z23)   14   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

## 2018-01-17 NOTE — MISCELLANEOUS
Message   Recorded as Task   Date: 2016 09:23 AM, Created By: Jose Portillo   Task Name: Call Back   Assigned To: Jacked File   Regarding Patient: Ranjana Grimm, Status: In Progress   Norma Rao - 2016 9:23 AM     TASK CREATED  Caller: Self; [phone] (Home)  pt having severe pain, was at er the other night  and they said she has a cyst,now she just lft work due to her pain please call her at [phone] - 2016 9:24 AM     TASK REASSIGNED: Previously Assigned To karsonga Oscar Rend - 2016 9:36 AM     TASK IN PROGRESS   Julien Shaw - 2016 9:50 AM     TASK EDITED  Pts ua test in ER was neg for preg ,  Pts u/s showed benign appearing r ovarian cyst ,about 4 cm  Pt has severe abdominal pain over entire abdomen   Pt not bleeding  Given ov        Active Problems    1  Encounter for fetal anatomic survey (V28 81) (Z36)   2  Encounter for insertion of mirena IUD (V25 11) (Z30 430)   3  Encounter for pregnancy related examination (V22 1) (Z34 90)   4  Encounter for routine gynecological examination (V72 31) (Z01 419)   5  Encounter for routine postpartum follow-up (V24 2) (Z39 2)   6  Encounter for screening for risk of pre-term labor (V28 82) (Z36)   7  IUD complication (379 55) (I64 6QJQ)   8  Need for Tdap vaccination (V06 1) (Z23)   9   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

## 2018-06-12 ENCOUNTER — OFFICE VISIT (OUTPATIENT)
Dept: URGENT CARE | Facility: MEDICAL CENTER | Age: 23
End: 2018-06-12
Payer: COMMERCIAL

## 2018-06-12 VITALS
HEIGHT: 65 IN | BODY MASS INDEX: 33.15 KG/M2 | SYSTOLIC BLOOD PRESSURE: 128 MMHG | HEART RATE: 91 BPM | TEMPERATURE: 99.4 F | WEIGHT: 199 LBS | DIASTOLIC BLOOD PRESSURE: 79 MMHG | RESPIRATION RATE: 16 BRPM

## 2018-06-12 DIAGNOSIS — R30.0 DYSURIA: Primary | ICD-10-CM

## 2018-06-12 LAB
SL AMB  POCT GLUCOSE, UA: ABNORMAL
SL AMB LEUKOCYTE ESTERASE,UA: ABNORMAL
SL AMB POCT BILIRUBIN,UA: ABNORMAL
SL AMB POCT BLOOD,UA: ABNORMAL
SL AMB POCT CLARITY,UA: ABNORMAL
SL AMB POCT COLOR,UA: ABNORMAL
SL AMB POCT KETONES,UA: ABNORMAL
SL AMB POCT NITRITE,UA: ABNORMAL
SL AMB POCT PH,UA: 7
SL AMB POCT SPECIFIC GRAVITY,UA: 1
SL AMB POCT URINE HCG: NORMAL
SL AMB POCT URINE PROTEIN: 0.2
SL AMB POCT UROBILINOGEN: 0

## 2018-06-12 PROCEDURE — 87086 URINE CULTURE/COLONY COUNT: CPT | Performed by: PHYSICIAN ASSISTANT

## 2018-06-12 PROCEDURE — G0382 LEV 3 HOSP TYPE B ED VISIT: HCPCS | Performed by: PHYSICIAN ASSISTANT

## 2018-06-12 PROCEDURE — S9083 URGENT CARE CENTER GLOBAL: HCPCS | Performed by: PHYSICIAN ASSISTANT

## 2018-06-12 RX ORDER — SULFAMETHOXAZOLE AND TRIMETHOPRIM 800; 160 MG/1; MG/1
1 TABLET ORAL EVERY 12 HOURS SCHEDULED
Qty: 6 TABLET | Refills: 0 | Status: SHIPPED | OUTPATIENT
Start: 2018-06-12 | End: 2018-06-15

## 2018-06-12 RX ORDER — FLUCONAZOLE 150 MG/1
150 TABLET ORAL ONCE
Qty: 1 TABLET | Refills: 0 | Status: SHIPPED | OUTPATIENT
Start: 2018-06-12 | End: 2018-06-12

## 2018-06-13 LAB — BACTERIA UR CULT: NORMAL

## 2018-06-20 ENCOUNTER — OFFICE VISIT (OUTPATIENT)
Dept: URGENT CARE | Facility: MEDICAL CENTER | Age: 23
End: 2018-06-20
Payer: COMMERCIAL

## 2018-06-20 VITALS
HEART RATE: 72 BPM | TEMPERATURE: 98.5 F | RESPIRATION RATE: 20 BRPM | WEIGHT: 198.38 LBS | SYSTOLIC BLOOD PRESSURE: 110 MMHG | DIASTOLIC BLOOD PRESSURE: 80 MMHG | BODY MASS INDEX: 33.01 KG/M2

## 2018-06-20 DIAGNOSIS — L30.9 DERMATITIS: Primary | ICD-10-CM

## 2018-06-20 PROCEDURE — S9083 URGENT CARE CENTER GLOBAL: HCPCS | Performed by: FAMILY MEDICINE

## 2018-06-20 PROCEDURE — G0382 LEV 3 HOSP TYPE B ED VISIT: HCPCS | Performed by: FAMILY MEDICINE

## 2018-06-20 RX ORDER — TRIAMCINOLONE ACETONIDE 1 MG/G
CREAM TOPICAL 2 TIMES DAILY
Qty: 30 G | Refills: 0 | Status: SHIPPED | OUTPATIENT
Start: 2018-06-20 | End: 2019-05-10 | Stop reason: ALTCHOICE

## 2018-06-20 NOTE — PROGRESS NOTES
[facility]        NAME: Steven Werner is a 20 y o  female  : 1995    MRN: [id number]    Assessment and Plan   Dermatitis [L30 9]  1  Dermatitis  triamcinolone (KENALOG) 0 1 % cream         Patient Instructions     Patient Instructions     Contact Dermatitis   AMBULATORY CARE:   Contact dermatitis  is a rash  It develops when you touch something that irritates your skin or causes an allergic reaction  Common signs and symptoms include the following:   · Red, swollen, painful rash           · Skin that itches, stings, or burns    · Dry, scaly, or crusty skin patches    · Bumps or blisters    · Fluid draining from blisters  Call 911 for any of the following:   · You have sudden trouble breathing  · Your throat swells and you have trouble eating  · Your face is swollen  Contact your healthcare provider if:   · You have a fever  · Your blisters are draining pus  · Your rash spreads or does not get better, even after treatment  · You have questions or concerns about your condition or care  Treatment for contact dermatitis  involves removing any irritants or allergens that cause your rash  You may also need medicines to decrease itching and swelling  They will be given as a topical medicine to apply to your rash or as a pill  Manage contact dermatitis:   · Take short baths or showers in cool water  Use mild soap or soap-free cleansers  Add oatmeal, baking soda, or cornstarch to the bath water to help decrease skin irritation  · Avoid skin irritants , such as makeup, hair products, soaps, and cleansers  Use products that do not contain perfume or dye  · Apply a cool compress to your rash  This will help soothe your skin  · Keep your skin moist   Rub unscented cream or lotion on your skin to prevent dryness and itching  Do this right after a bath or shower when your skin is still damp    Follow up with your healthcare provider as directed:  Write down your questions so you remember to ask them during your visits  © 2017 [address] [employer] Information is for End User's use only and may not be sold, redistributed or otherwise used for commercial purposes  All illustrations and images included in CareNotes® are the copyrighted property of A D A M , Inc  or [employer]  The above information is an  only  It is not intended as medical advice for individual conditions or treatments  Talk to your doctor, nurse or pharmacist before following any medical regimen to see if it is safe and effective for you  Follow up with PCP in 3-5 days  Proceed to  ER if symptoms worsen  Chief Complaint     Chief Complaint   Patient presents with    Rash     small pin point rash to extremites and around pant line  c/o itchiness  comes and goes   History of Present Illness       Rash   This is a new problem  The current episode started 1 to 4 weeks ago  The problem has been waxing and waning since onset  The affected locations include the right upper leg, left upper leg and left arm  Pertinent negatives include no anorexia, congestion, facial edema, nail changes, rhinorrhea, shortness of breath or sore throat  Past treatments include nothing  Review of Systems   Review of Systems   Constitutional: Negative  HENT: Negative for congestion, rhinorrhea and sore throat  Respiratory: Negative for shortness of breath  Cardiovascular: Negative  Gastrointestinal: Negative for anorexia  Skin: Positive for rash  Negative for nail changes           Current Medications       Current Outpatient Prescriptions:     Naproxen Sodium (ALEVE PO), Take by mouth, Disp: , Rfl:     norethindrone-ethinyl estradiol (FEMHRT 1/5) 1-5 MG-MCG TABS, Take by mouth daily, Disp: , Rfl:     pantoprazole (PROTONIX) 20 mg tablet, Take 1 tablet by mouth daily, Disp: 20 tablet, Rfl: 0    triamcinolone (KENALOG) 0 1 % cream, Apply topically 2 (two) times a day for 7 days, Disp: 30 g, Rfl: 0    Current Allergies     Allergies as of 2018 - Reviewed 2018   Allergen Reaction Noted    Bee venom Anaphylaxis 2016            The following portions of the patient's history were reviewed and updated as appropriate: allergies, current medications, past family history, past medical history, past social history, past surgical history and problem list      Past Medical History:   Diagnosis Date    Patient denies medical problems        Past Surgical History:   Procedure Laterality Date    ABDOMINAL SURGERY       2 years ago     SECTION      CHOLECYSTECTOMY LAPAROSCOPIC N/A 2017    Procedure: [place] [facility];  Surgeon: Jolanta Keller MD;  Location: [facility];  Service: General       Family History   Problem Relation Age of Onset    Arthritis Mother     Diabetes Mother         insulin dependent diabetes mellitus    Thyroid disease Mother     Anemia Mother     Thalassemia Mother     No Known Problems Father     No Known Problems Brother     Lung cancer Maternal Grandmother     Diabetes Paternal Uncle         insulin dependent diabetes mellitus         Medications have been verified  Objective   /80   Pulse 72   Temp 98 5 °F (36 9 °C) (Temporal)   Resp 20   Wt 90 kg (198 lb 6 oz)   BMI 33 01 kg/m²        Physical Exam     Physical Exam   Constitutional: She is oriented to person, place, and time  She appears well-developed and well-nourished  HENT:   Right Ear: Tympanic membrane and external ear normal    Left Ear: Tympanic membrane and external ear normal    Neck: Normal range of motion  No edema present  Cardiovascular: Normal rate, regular rhythm, S1 normal, S2 normal and normal heart sounds  No murmur heard  Pulmonary/Chest: Effort normal and breath sounds normal  No respiratory distress  She has no wheezes  She has no rales  She exhibits no tenderness     Lymphadenopathy:     She has no cervical adenopathy  Neurological: She is alert and oriented to person, place, and time  Skin: Skin is warm, dry and intact  Rash noted  Rash is urticarial         Psychiatric: She has a normal mood and affect  Her speech is normal and behavior is normal    Nursing note and vitals reviewed

## 2018-06-20 NOTE — PATIENT INSTRUCTIONS
Contact Dermatitis   AMBULATORY CARE:   Contact dermatitis  is a rash  It develops when you touch something that irritates your skin or causes an allergic reaction  Common signs and symptoms include the following:   · Red, swollen, painful rash           · Skin that itches, stings, or burns    · Dry, scaly, or crusty skin patches    · Bumps or blisters    · Fluid draining from blisters  Call 911 for any of the following:   · You have sudden trouble breathing  · Your throat swells and you have trouble eating  · Your face is swollen  Contact your healthcare provider if:   · You have a fever  · Your blisters are draining pus  · Your rash spreads or does not get better, even after treatment  · You have questions or concerns about your condition or care  Treatment for contact dermatitis  involves removing any irritants or allergens that cause your rash  You may also need medicines to decrease itching and swelling  They will be given as a topical medicine to apply to your rash or as a pill  Manage contact dermatitis:   · Take short baths or showers in cool water  Use mild soap or soap-free cleansers  Add oatmeal, baking soda, or cornstarch to the bath water to help decrease skin irritation  · Avoid skin irritants , such as makeup, hair products, soaps, and cleansers  Use products that do not contain perfume or dye  · Apply a cool compress to your rash  This will help soothe your skin  · Keep your skin moist   Rub unscented cream or lotion on your skin to prevent dryness and itching  Do this right after a bath or shower when your skin is still damp  Follow up with your healthcare provider as directed:  Write down your questions so you remember to ask them during your visits  © 2017 [employer] Information is for End User's use only and may not be sold, redistributed or otherwise used for commercial purposes   All illustrations and images included in CareNotes® are the copyrighted property of [employer]  or [employer]  The above information is an  only  It is not intended as medical advice for individual conditions or treatments  Talk to your doctor, nurse or pharmacist before following any medical regimen to see if it is safe and effective for you

## 2018-11-07 ENCOUNTER — OFFICE VISIT (OUTPATIENT)
Dept: URGENT CARE | Facility: MEDICAL CENTER | Age: 23
End: 2018-11-07
Payer: COMMERCIAL

## 2018-11-07 VITALS
HEART RATE: 93 BPM | SYSTOLIC BLOOD PRESSURE: 119 MMHG | DIASTOLIC BLOOD PRESSURE: 78 MMHG | RESPIRATION RATE: 20 BRPM | TEMPERATURE: 98.6 F | WEIGHT: 208.5 LBS | OXYGEN SATURATION: 98 % | BODY MASS INDEX: 34.74 KG/M2 | HEIGHT: 65 IN

## 2018-11-07 DIAGNOSIS — B37.3 VAGINAL CANDIDIASIS: ICD-10-CM

## 2018-11-07 DIAGNOSIS — R30.0 DYSURIA: Primary | ICD-10-CM

## 2018-11-07 DIAGNOSIS — N30.01 ACUTE CYSTITIS WITH HEMATURIA: ICD-10-CM

## 2018-11-07 LAB
SL AMB  POCT GLUCOSE, UA: ABNORMAL
SL AMB LEUKOCYTE ESTERASE,UA: ABNORMAL
SL AMB POCT BILIRUBIN,UA: ABNORMAL
SL AMB POCT BLOOD,UA: ABNORMAL
SL AMB POCT CLARITY,UA: ABNORMAL
SL AMB POCT COLOR,UA: YELLOW
SL AMB POCT KETONES,UA: ABNORMAL
SL AMB POCT NITRITE,UA: ABNORMAL
SL AMB POCT PH,UA: 6
SL AMB POCT SPECIFIC GRAVITY,UA: 1.02
SL AMB POCT URINE PROTEIN: ABNORMAL
SL AMB POCT UROBILINOGEN: 0.2

## 2018-11-07 PROCEDURE — 87086 URINE CULTURE/COLONY COUNT: CPT | Performed by: PHYSICIAN ASSISTANT

## 2018-11-07 PROCEDURE — 87077 CULTURE AEROBIC IDENTIFY: CPT | Performed by: PHYSICIAN ASSISTANT

## 2018-11-07 RX ORDER — NORETHINDRONE ACETATE AND ETHINYL ESTRADIOL 1MG-20(21)
1 KIT ORAL DAILY
COMMUNITY
Start: 2016-12-13 | End: 2019-05-10 | Stop reason: ALTCHOICE

## 2018-11-07 RX ORDER — NORETHINDRONE ACETATE AND ETHINYL ESTRADIOL 1; .02 MG/1; MG/1
1 TABLET ORAL
COMMUNITY
End: 2019-05-10 | Stop reason: ALTCHOICE

## 2018-11-07 RX ORDER — FLUCONAZOLE 150 MG/1
150 TABLET ORAL ONCE
Qty: 1 TABLET | Refills: 0 | Status: SHIPPED | OUTPATIENT
Start: 2018-11-07 | End: 2018-11-07

## 2018-11-07 RX ORDER — NITROFURANTOIN 25; 75 MG/1; MG/1
100 CAPSULE ORAL 2 TIMES DAILY
Qty: 14 CAPSULE | Refills: 0 | Status: SHIPPED | OUTPATIENT
Start: 2018-11-07 | End: 2018-11-14

## 2018-11-07 NOTE — PATIENT INSTRUCTIONS
Urinalysis performed in office consistent with urinary tract infection  Will send urine specimen for cultures  Patient also has symptoms consistent with a vaginal yeast infection  Will treat for both a yeast infection as well as cystitis  Prescription sent to pharmacy for fluconazole and Macrobid-use as directed  May use over-the-counter topical ointment for symptom relief  Follow up with Ob/gyn if symptoms do not improve  Proceed to  ER if symptoms worsen  Dysuria   AMBULATORY CARE:   Dysuria  is trouble urinating, or pain, burning, or discomfort when you urinate  Dysuria is usually a symptom of another problem, such as a blockage or urinary tract infection  Common symptoms include the following:   · Fever     · Cloudy, bad smelling urine     · Urge to urinate often but urinating little     · Back, side, or abdominal pain     · Blood in your urine     · Discharge that smells bad     · Itching  Seek care immediately if:   · You have severe back, side, or abdominal pain  · You have fever and shaking chills  · You vomit several times in a row  Contact your healthcare provider if:   · Your symptoms do not go away, even after treatment  · You have questions or concerns about your condition or care  Treatment for dysuria  may include medicines to treat a bacterial infection or help decrease bladder spasms  Manage your dysuria:   · Drink more liquids  Liquids help flush out bacteria that may be causing an infection  Ask your healthcare provider how much liquid to drink each day and which liquids are best for you  · Take sitz baths as directed  Fill a bathtub with 4 to 6 inches of warm water  You may also use a sitz bath pan that fits over a toilet  Sit in the sitz bath for 20 minutes  Do this 2 to 3 times a day, or as directed  The warm water can help decrease pain and swelling     Follow up with your healthcare provider as directed:  Write down your questions so you remember to ask them during your visits  © 2017 [address] [employer] Information is for End User's use only and may not be sold, redistributed or otherwise used for commercial purposes  All illustrations and images included in CareNotes® are the copyrighted property of [employer] M , [employer]  or [employer]  The above information is an  only  It is not intended as medical advice for individual conditions or treatments  Talk to your doctor, nurse or pharmacist before following any medical regimen to see if it is safe and effective for you  Vulvovaginal Candidiasis   AMBULATORY CARE:   Vulvovaginal candidiasis,  or yeast infection, is a common vaginal infection  Vulvovaginal candidiasis is caused by a fungus, or yeast-like germ  Fungi are normally found in your vagina  When there are too many fungi, it can cause an infection  Seek care immediately if:   · You have fever and chills  · You are bleeding from your vagina and it is not your monthly period  · You develop abdominal or pelvic pain  Contact your healthcare provider if:   · Your signs and symptoms get worse, even after treatment  · You have questions or concerns about your condition or care  Signs and symptoms:   · Thick, white, cheese-like discharge from your vagina    · Itching, swelling, or redness in your vagina    · Burning when you urinate  Treatment for vulvovaginal candidiasis  includes medicines to treat the fungal infection and decrease inflammation  The medicine may be a pill, topical cream, or vaginal suppository  Manage your symptoms:   · Wear cotton underwear  · Keep the vaginal area clean and dry  · Do not have sex until your symptoms are gone  · Do not douche  · Do not use feminine hygiene sprays, powders, or bubble bath  Prevent another infection:   · Take showers instead of baths  · Eat yogurt  · Limit the amount of alcohol you drink  · Control your blood sugar if you are diabetic      · Limit your time in hot tubs  Follow up with your healthcare provider as directed:  Write down your questions so you remember to ask them during your visits  © 2017 [address] [employer] Information is for End User's use only and may not be sold, redistributed or otherwise used for commercial purposes  All illustrations and images included in CareNotes® are the copyrighted property of [employer] , [employer]  or [employer]  The above information is an  only  It is not intended as medical advice for individual conditions or treatments  Talk to your doctor, nurse or pharmacist before following any medical regimen to see if it is safe and effective for you

## 2018-11-07 NOTE — PROGRESS NOTES
[email or web address]        NAME: Marquise Parker is a 24 y o  female  : 1995    MRN: [id number]  DATE: 2018  TIME: 11:14 AM    Assessment and Plan   Dysuria [R30 0]  1  Dysuria  POCT urine dip   2  Acute cystitis with hematuria  nitrofurantoin (MACROBID) 100 mg capsule    Urine culture   3  Vaginal candidiasis  fluconazole (DIFLUCAN) 150 mg tablet         Patient Instructions   Urinalysis performed in office consistent with urinary tract infection  Will send urine specimen for cultures  Patient also has symptoms consistent with a vaginal yeast infection  Will treat for both a yeast infection as well as cystitis  Prescription sent to pharmacy for fluconazole and Macrobid-use as directed  May use over-the-counter topical ointment for symptom relief  Follow up with Ob/gyn if symptoms do not improve  Proceed to  ER if symptoms worsen  Chief Complaint     Chief Complaint   Patient presents with    Vaginitis     x 1wweek , c/o burning when viod, and itchyness,some discharge noted  History of Present Illness   The patient is a 27-year-old female who presents with pelvic and urinary symptoms for approximately 1 week  She states that she has pain and burning with urination  She denies hematuria  Negative urinary frequency or urgency  Urine is not decreased  She does have a thick, white vaginal discharge  Positive internal and external itching  She denies a rash or lesions  Negative pelvic pain  Negative flank pain  Negative abdominal pain, nausea, vomiting or diarrhea  Negative fever or chills  She is sexually active  She denies a history of STDs  She has not done anything treatment wise for her symptoms  HPI    Review of Systems   Review of Systems   Constitutional: Negative for chills and fever  Genitourinary: Positive for dyspareunia, dysuria, vaginal discharge and vaginal pain   Negative for decreased urine volume, difficulty urinating, flank pain, frequency, genital sores, hematuria, pelvic pain, urgency and vaginal bleeding  Musculoskeletal: Negative for arthralgias, joint swelling and myalgias  Skin: Negative for rash and wound  All other systems reviewed and are negative          Current Medications       Current Outpatient Prescriptions:     fluconazole (DIFLUCAN) 150 mg tablet, Take 1 tablet (150 mg total) by mouth once for 1 dose, Disp: 1 tablet, Rfl: 0    Naproxen Sodium (ALEVE PO), Take by mouth, Disp: , Rfl:     nitrofurantoin (MACROBID) 100 mg capsule, Take 1 capsule (100 mg total) by mouth 2 (two) times a day for 7 days, Disp: 14 capsule, Rfl: 0    norethindrone-ethinyl estradiol (FEMHRT ) 1-5 MG-MCG TABS, Take by mouth daily, Disp: , Rfl:     norethindrone-ethinyl estradiol (JUNEL FE ) 1-20 MG-MCG per tablet, Take 1 tablet by mouth daily, Disp: , Rfl:     norethindrone-ethinyl estradiol (MICROGESTIN ) 1-20 MG-MCG per tablet, Take 1 tablet by mouth, Disp: , Rfl:     pantoprazole (PROTONIX) 20 mg tablet, Take 1 tablet by mouth daily (Patient not taking: Reported on 2018 ), Disp: 20 tablet, Rfl: 0    triamcinolone (KENALOG) 0 1 % cream, Apply topically 2 (two) times a day for 7 days, Disp: 30 g, Rfl: 0    Current Allergies     Allergies as of 2018 - Reviewed 2018   Allergen Reaction Noted    Bee venom Anaphylaxis 2016            The following portions of the patient's history were reviewed and updated as appropriate: allergies, current medications, past family history, past medical history, past social history, past surgical history and problem list      Past Medical History:   Diagnosis Date    Patient denies medical problems        Past Surgical History:   Procedure Laterality Date    ABDOMINAL SURGERY       2 years ago     SECTION      CHOLECYSTECTOMY LAPAROSCOPIC N/A 2017    Procedure: CHOLECYSTECTOMY LAPAROSCOPIC;  Surgeon: Lima Benton MD;  Location[facility];  Service: General       Family History   Problem Relation Age of Onset    Arthritis Mother     Diabetes Mother         insulin dependent diabetes mellitus    Thyroid disease Mother     Anemia Mother     Thalassemia Mother     No Known Problems Father     No Known Problems Brother     Lung cancer Maternal Grandmother     Diabetes Paternal Uncle         insulin dependent diabetes mellitus         Medications have been verified  Objective   /78   Pulse 93   Temp 98 6 °F (37 °C) (Temporal)   Resp 20   Ht 5' 5" (1 651 m)   Wt 94 6 kg (208 lb 8 oz)   SpO2 98%   BMI 34 70 kg/m²        Physical Exam     Physical Exam   Constitutional: She appears well-developed and well-nourished  No distress  HENT:   Head: Normocephalic and atraumatic  Pulmonary/Chest: Effort normal  No respiratory distress  Abdominal: Soft  Bowel sounds are normal  She exhibits no distension, no ascites and no mass  There is no tenderness  There is no rigidity, no rebound, no guarding, no CVA tenderness, no tenderness at McBurney's point and negative Martínez's sign  Musculoskeletal: She exhibits no edema  Skin: She is not diaphoretic  Nursing note and vitals reviewed

## 2018-11-08 LAB — BACTERIA UR CULT: ABNORMAL

## 2019-05-10 ENCOUNTER — OFFICE VISIT (OUTPATIENT)
Dept: OBGYN CLINIC | Facility: MEDICAL CENTER | Age: 24
End: 2019-05-10
Payer: COMMERCIAL

## 2019-05-10 VITALS — WEIGHT: 218 LBS | BODY MASS INDEX: 36.28 KG/M2 | DIASTOLIC BLOOD PRESSURE: 72 MMHG | SYSTOLIC BLOOD PRESSURE: 114 MMHG

## 2019-05-10 DIAGNOSIS — N91.2 AMENORRHEA: Primary | ICD-10-CM

## 2019-05-10 PROCEDURE — 76817 TRANSVAGINAL US OBSTETRIC: CPT | Performed by: PHYSICIAN ASSISTANT

## 2019-05-21 ENCOUNTER — TELEPHONE (OUTPATIENT)
Dept: OBGYN CLINIC | Facility: CLINIC | Age: 24
End: 2019-05-21

## 2019-05-21 ENCOUNTER — INITIAL PRENATAL (OUTPATIENT)
Dept: OBGYN CLINIC | Facility: CLINIC | Age: 24
End: 2019-05-21

## 2019-05-21 DIAGNOSIS — Z34.81 PRENATAL CARE, SUBSEQUENT PREGNANCY, FIRST TRIMESTER: Primary | ICD-10-CM

## 2019-05-21 PROCEDURE — OBC: Performed by: OBSTETRICS & GYNECOLOGY

## 2019-07-03 ENCOUNTER — APPOINTMENT (OUTPATIENT)
Dept: LAB | Facility: MEDICAL CENTER | Age: 24
End: 2019-07-03
Payer: COMMERCIAL

## 2019-07-03 ENCOUNTER — TRANSCRIBE ORDERS (OUTPATIENT)
Dept: ADMINISTRATIVE | Facility: HOSPITAL | Age: 24
End: 2019-07-03

## 2019-07-03 DIAGNOSIS — Z34.81 PRENATAL CARE, SUBSEQUENT PREGNANCY, FIRST TRIMESTER: Primary | ICD-10-CM

## 2019-07-03 DIAGNOSIS — Z34.81 PRENATAL CARE, SUBSEQUENT PREGNANCY, FIRST TRIMESTER: ICD-10-CM

## 2019-07-03 LAB
ABO GROUP BLD: NORMAL
BACTERIA UR QL AUTO: ABNORMAL /HPF
BASOPHILS # BLD AUTO: 0.04 THOUSANDS/ΜL (ref 0–0.1)
BASOPHILS NFR BLD AUTO: 1 % (ref 0–1)
BILIRUB UR QL STRIP: NEGATIVE
BLD GP AB SCN SERPL QL: NEGATIVE
CLARITY UR: CLEAR
COLOR UR: YELLOW
EOSINOPHIL # BLD AUTO: 0.2 THOUSAND/ΜL (ref 0–0.61)
EOSINOPHIL NFR BLD AUTO: 3 % (ref 0–6)
ERYTHROCYTE [DISTWIDTH] IN BLOOD BY AUTOMATED COUNT: 12.9 % (ref 11.6–15.1)
EXTERNAL HIV-1 ANTIBODY: NORMAL
GLUCOSE 1H P 50 G GLC PO SERPL-MCNC: 120 MG/DL
GLUCOSE UR STRIP-MCNC: NEGATIVE MG/DL
HCT VFR BLD AUTO: 39.7 % (ref 34.8–46.1)
HGB BLD-MCNC: 13 G/DL (ref 11.5–15.4)
HGB UR QL STRIP.AUTO: NEGATIVE
HYALINE CASTS #/AREA URNS LPF: ABNORMAL /LPF
IMM GRANULOCYTES # BLD AUTO: 0.04 THOUSAND/UL (ref 0–0.2)
IMM GRANULOCYTES NFR BLD AUTO: 1 % (ref 0–2)
KETONES UR STRIP-MCNC: NEGATIVE MG/DL
LEUKOCYTE ESTERASE UR QL STRIP: ABNORMAL
LYMPHOCYTES # BLD AUTO: 1.39 THOUSANDS/ΜL (ref 0.6–4.47)
LYMPHOCYTES NFR BLD AUTO: 21 % (ref 14–44)
MCH RBC QN AUTO: 28.5 PG (ref 26.8–34.3)
MCHC RBC AUTO-ENTMCNC: 32.7 G/DL (ref 31.4–37.4)
MCV RBC AUTO: 87 FL (ref 82–98)
MONOCYTES # BLD AUTO: 0.25 THOUSAND/ΜL (ref 0.17–1.22)
MONOCYTES NFR BLD AUTO: 4 % (ref 4–12)
NEUTROPHILS # BLD AUTO: 4.73 THOUSANDS/ΜL (ref 1.85–7.62)
NEUTS SEG NFR BLD AUTO: 70 % (ref 43–75)
NITRITE UR QL STRIP: NEGATIVE
NON-SQ EPI CELLS URNS QL MICRO: ABNORMAL /HPF
NRBC BLD AUTO-RTO: 0 /100 WBCS
PH UR STRIP.AUTO: 7 [PH]
PLATELET # BLD AUTO: 235 THOUSANDS/UL (ref 149–390)
PMV BLD AUTO: 10 FL (ref 8.9–12.7)
PROT UR STRIP-MCNC: NEGATIVE MG/DL
RBC # BLD AUTO: 4.56 MILLION/UL (ref 3.81–5.12)
RBC #/AREA URNS AUTO: ABNORMAL /HPF
RH BLD: POSITIVE
RUBV IGG SERPL IA-ACNC: 75.2 IU/ML
SP GR UR STRIP.AUTO: 1.01 (ref 1–1.03)
SPECIMEN EXPIRATION DATE: NORMAL
UROBILINOGEN UR QL STRIP.AUTO: 0.2 E.U./DL
WBC # BLD AUTO: 6.65 THOUSAND/UL (ref 4.31–10.16)
WBC #/AREA URNS AUTO: ABNORMAL /HPF

## 2019-07-03 PROCEDURE — 86900 BLOOD TYPING SEROLOGIC ABO: CPT

## 2019-07-03 PROCEDURE — 86850 RBC ANTIBODY SCREEN: CPT

## 2019-07-03 PROCEDURE — 86592 SYPHILIS TEST NON-TREP QUAL: CPT

## 2019-07-03 PROCEDURE — 86762 RUBELLA ANTIBODY: CPT

## 2019-07-03 PROCEDURE — 87086 URINE CULTURE/COLONY COUNT: CPT | Performed by: PHYSICIAN ASSISTANT

## 2019-07-03 PROCEDURE — 87340 HEPATITIS B SURFACE AG IA: CPT

## 2019-07-03 PROCEDURE — 82950 GLUCOSE TEST: CPT

## 2019-07-03 PROCEDURE — 85025 COMPLETE CBC W/AUTO DIFF WBC: CPT | Performed by: PHYSICIAN ASSISTANT

## 2019-07-03 PROCEDURE — 81001 URINALYSIS AUTO W/SCOPE: CPT

## 2019-07-03 PROCEDURE — 86901 BLOOD TYPING SEROLOGIC RH(D): CPT

## 2019-07-03 PROCEDURE — 87389 HIV-1 AG W/HIV-1&-2 AB AG IA: CPT

## 2019-07-03 PROCEDURE — 36415 COLL VENOUS BLD VENIPUNCTURE: CPT | Performed by: PHYSICIAN ASSISTANT

## 2019-07-04 LAB — HBV SURFACE AG SER QL: NORMAL

## 2019-07-05 LAB
BACTERIA UR CULT: NORMAL
HIV 1+2 AB+HIV1 P24 AG SERPL QL IA: NORMAL
RPR SER QL: NORMAL

## 2019-07-22 ENCOUNTER — INITIAL PRENATAL (OUTPATIENT)
Dept: OBGYN CLINIC | Facility: CLINIC | Age: 24
End: 2019-07-22

## 2019-07-22 VITALS — BODY MASS INDEX: 36.94 KG/M2 | SYSTOLIC BLOOD PRESSURE: 126 MMHG | WEIGHT: 222 LBS | DIASTOLIC BLOOD PRESSURE: 82 MMHG

## 2019-07-22 DIAGNOSIS — Z76.2: ICD-10-CM

## 2019-07-22 DIAGNOSIS — O99.212 OBESITY COMPLICATING PREGNANCY IN SECOND TRIMESTER: ICD-10-CM

## 2019-07-22 DIAGNOSIS — Z11.3 SCREENING FOR STD (SEXUALLY TRANSMITTED DISEASE): ICD-10-CM

## 2019-07-22 DIAGNOSIS — Z01.419 ENCOUNTER FOR GYNECOLOGICAL EXAMINATION WITHOUT ABNORMAL FINDING: Primary | ICD-10-CM

## 2019-07-22 DIAGNOSIS — Z72.0 TOBACCO USE: ICD-10-CM

## 2019-07-22 PROCEDURE — PNV: Performed by: NURSE PRACTITIONER

## 2019-07-22 PROCEDURE — 87591 N.GONORRHOEAE DNA AMP PROB: CPT | Performed by: NURSE PRACTITIONER

## 2019-07-22 PROCEDURE — 87491 CHLMYD TRACH DNA AMP PROBE: CPT | Performed by: NURSE PRACTITIONER

## 2019-07-22 PROCEDURE — G0145 SCR C/V CYTO,THINLAYER,RESCR: HCPCS | Performed by: NURSE PRACTITIONER

## 2019-07-22 NOTE — PATIENT INSTRUCTIONS
Pregnancy at 23 to [address]:   What changes are happening in my body? Now that you are in your second trimester, you have more energy  You may also be feeling hungrier than usual  You may be gaining about ½ to 1 pound a week, and your pregnancy is beginning to show  You may need to start wearing maternity clothes  As your baby gets larger, you may have other symptoms  These may include body aches or stretch marks on your abdomen, breasts, thighs, or buttocks  How do I care for myself at this stage of my pregnancy? · Eat a variety of healthy foods  Healthy foods include fruits, vegetables, whole-grain breads, low-fat dairy foods, beans, lean meats, and fish  Drink liquids as directed  Ask how much liquid to drink each day and which liquids are best for you  Limit caffeine to less than 200 milligrams each day  Limit your intake of fish to 2 servings each week  Choose fish low in mercury such as canned light tuna, shrimp, salmon, cod, or tilapia  Do not  eat fish high in mercury such as swordfish, tilefish, peewee mackerel, and shark  · Take prenatal vitamins as directed  Your need for certain vitamins and minerals, such as folic acid, increases during pregnancy  Prenatal vitamins provide some of the extra vitamins and minerals you need  Prenatal vitamins may also help to decrease the risk of certain birth defects  · Talk to your healthcare provider about exercise  Moderate exercise can help you stay fit  Your healthcare provider will help you plan an exercise program that is safe for you during pregnancy  · Do not smoke  If you smoke, it is never too late to quit  Smoking increases your risk of a miscarriage and other health problems during your pregnancy  Smoking can cause your baby to be born too early or weigh less at birth  Ask your healthcare provider for information if you need help quitting  · Do not drink alcohol    Alcohol passes from your body to your baby through the placenta  It can affect your baby's brain development and cause fetal alcohol syndrome (FAS)  FAS is a group of conditions that causes mental, behavior, and growth problems  · Talk to your healthcare provider before you take any medicines  Many medicines may harm your baby if you take them when you are pregnant  Do not take any medicines, vitamins, herbs, or supplements without first talking to your healthcare provider  Never use illegal or street drugs (such as marijuana or cocaine) while you are pregnant  What are some safety tips during pregnancy? · Avoid hot tubs and saunas  Do not use a hot tub or sauna while you are pregnant, especially during your first trimester  Hot tubs and saunas may raise your baby's temperature and increase the risk of birth defects  · Avoid toxoplasmosis  This is an infection caused by eating raw meat or being around infected cat feces  It can cause birth defects, miscarriages, and other problems  Wash your hands after you touch raw meat  Make sure any meat is well-cooked before you eat it  Avoid raw eggs and unpasteurized milk  Use gloves or ask someone else to clean your cat's litter box while you are pregnant  What changes are happening with my baby? By 22 weeks, your baby is about 8 inches long from the top of the head to the rump (baby's bottom)  Your baby also weighs about 1 pound  Your baby is becoming much more active  You may be able to feel the baby move inside you now  The first movements may not be that noticeable  They may feel like a fluttering sensation  As time goes on, your baby's movements will become stronger and more noticeable  What do I need to know about prenatal care? During the first 28 weeks of your pregnancy, you will see your healthcare provider once a month  Your healthcare provider will check your blood pressure and weight  You may also need the following:  · A urine test  may also be done to check for sugar and protein   These can be signs of gestational diabetes or infection  Protein in your urine may also be a sign of preeclampsia  Preeclampsia is a condition that can develop during week 20 or later of your pregnancy  It causes high blood pressure, and it can cause problems with your kidneys and other organs  · Fundal height  is a measurement of your uterus to check your baby's growth  This number is usually the same as the number of weeks that you have been pregnant  · A fetal ultrasound  shows pictures of your baby inside your uterus  It shows your baby's development  The movement and position of your baby can also be seen  Your healthcare provider may be able to tell you what your baby's gender is during the ultrasound  · Your baby's heart rate  will be checked  When should I seek immediate care? · You develop a severe headache that does not go away  · You have new or increased vision changes, such as blurred or spotted vision  · You have new or increased swelling in your face or hands  · You have vaginal spotting or bleeding  · Your water broke or you feel warm water gushing or trickling from your vagina  When should I contact my healthcare provider? · You have abdominal cramps, pressure, or tightening  · You have a change in vaginal discharge  · You cannot keep food or drinks down, and you are losing weight  · You have chills or a fever  · You have vaginal itching, burning, or pain  · You have yellow, green, white, or foul-smelling vaginal discharge  · You have pain or burning when you urinate, less urine than usual, or pink or bloody urine  · You have questions or concerns about your condition or care  CARE AGREEMENT:   You have the right to help plan your care  Learn about your health condition and how it may be treated  Discuss treatment options with your caregivers to decide what care you want to receive  You always have the right to refuse treatment   The above information is an  only  It is not intended as medical advice for individual conditions or treatments  Talk to your doctor, nurse or pharmacist before following any medical regimen to see if it is safe and effective for you  © 2017 [address] [employer] Information is for End User's use only and may not be sold, redistributed or otherwise used for commercial purposes  All illustrations and images included in CareNotes® are the copyrighted property of A D A M , Inc  or [employer]

## 2019-07-22 NOTE — PROGRESS NOTES
Problem List Items Addressed This Visit        Other    At high risk for  complications    Encounter for gynecological examination without abnormal finding - Primary    Relevant Orders    Liquid-based pap, screening    Tobacco use    Obesity complicating pregnancy in second trimester      Other Visit Diagnoses     Screening for STD (sexually transmitted disease)        Relevant Orders    Chlamydia/GC amplified DNA by PCR        Here for 1st OB appointment accompanied by the FOB  Feels well  Pap/GC/CT done today  Has appointment with MFM on   She is concerned about her previous complication with her son who is 3 yo now of  alloimmune thrombocytopenia  States her son was born with a platelet count of 5  He was in the NICU for 6 days  She will discuss this with Perinatology on her appt on   She does admit to smoking 4 cigs per day, advised to try and quit  Advised of risks of smoking and pregnancy  This is her first OB visit since her interview  When asked why she didn't come she stated she thought the laboratory appt counted as her OB visit  Discussed timing and frequency of OB appointments and patient and FOB verbalized understanding  Denies LOF/CTX/VB  No other pregnancy concerns expressed by patient  All questions answered  Oriented to our practice

## 2019-07-23 LAB
C TRACH DNA SPEC QL NAA+PROBE: NEGATIVE
N GONORRHOEA DNA SPEC QL NAA+PROBE: NEGATIVE

## 2019-07-24 LAB
LAB AP GYN PRIMARY INTERPRETATION: NORMAL
LAB AP LMP: NORMAL
Lab: NORMAL

## 2019-07-26 ENCOUNTER — OFFICE VISIT (OUTPATIENT)
Dept: URGENT CARE | Facility: MEDICAL CENTER | Age: 24
End: 2019-07-26
Payer: COMMERCIAL

## 2019-07-26 VITALS
SYSTOLIC BLOOD PRESSURE: 116 MMHG | WEIGHT: 222.38 LBS | HEART RATE: 87 BPM | DIASTOLIC BLOOD PRESSURE: 75 MMHG | HEIGHT: 66 IN | RESPIRATION RATE: 20 BRPM | OXYGEN SATURATION: 97 % | BODY MASS INDEX: 35.74 KG/M2 | TEMPERATURE: 96.9 F

## 2019-07-26 DIAGNOSIS — R30.0 DYSURIA: Primary | ICD-10-CM

## 2019-07-26 LAB
SL AMB  POCT GLUCOSE, UA: ABNORMAL
SL AMB LEUKOCYTE ESTERASE,UA: ABNORMAL
SL AMB POCT BILIRUBIN,UA: ABNORMAL
SL AMB POCT BLOOD,UA: ABNORMAL
SL AMB POCT CLARITY,UA: CLEAR
SL AMB POCT COLOR,UA: ABNORMAL
SL AMB POCT KETONES,UA: ABNORMAL
SL AMB POCT NITRITE,UA: ABNORMAL
SL AMB POCT PH,UA: 6
SL AMB POCT SPECIFIC GRAVITY,UA: 1
SL AMB POCT URINE PROTEIN: ABNORMAL
SL AMB POCT UROBILINOGEN: 0.2

## 2019-07-26 PROCEDURE — 87086 URINE CULTURE/COLONY COUNT: CPT | Performed by: FAMILY MEDICINE

## 2019-07-26 PROCEDURE — S9083 URGENT CARE CENTER GLOBAL: HCPCS | Performed by: FAMILY MEDICINE

## 2019-07-26 PROCEDURE — G0382 LEV 3 HOSP TYPE B ED VISIT: HCPCS | Performed by: FAMILY MEDICINE

## 2019-07-26 PROCEDURE — 81002 URINALYSIS NONAUTO W/O SCOPE: CPT | Performed by: FAMILY MEDICINE

## 2019-07-26 RX ORDER — AMOXICILLIN 500 MG/1
500 CAPSULE ORAL EVERY 12 HOURS SCHEDULED
Qty: 14 CAPSULE | Refills: 0 | Status: SHIPPED | OUTPATIENT
Start: 2019-07-26 | End: 2019-08-02

## 2019-07-26 NOTE — PROGRESS NOTES
[facility]        NAME: Vinh Her is a 24 y o  female  : 1995    MRN: [id number]  DATE: 2019  TIME: 6:53 PM    Assessment and Plan   Dysuria [R30 0]  1  Dysuria  amoxicillin (AMOXIL) 500 mg capsule    Urine culture    POCT urine dip     Dysuria and frequency x 4 days:  Denies any fever, flank pain or hematuria  Urine dip shows moderate leukocytes and blood, negative nitrites  Urine sent out for culture  Given patient is pregnant,will treat for positive bacteriuria  Amoxicillin prescribed  Advised patient to follow up with her OBGYN for further evaluation and treatment of any vaginal discharge  Patient Instructions     Complete abx therapy as indicated   Maintain adequate hydration  Follow up with PCP in 3-5 days  Proceed to  ER if symptoms worsen  Chief Complaint     Chief Complaint   Patient presents with    Possible UTI     Pt states she's been having vaginal discharge for 5 days, describe as painful and itching, lower back is hurting a little bit  She is 5 months pregnant  History of Present Illness       Patient is a 43-year-old female who presents with burning with urination x4 days  States she also has a little bit of itching in her vaginal area  She admits to urinary frequency but is not sure if it is because she is 5 months pregnant  Patient denies any fever, hematuria, flank pain, leakage of fluid, or vaginal bleeding  When asked about discharge, patient was a little vague stating that she is not sure if she has any and if so she does not know the color because she does not look down there  Admits that she follows up with her OBGYN but not regularly  Reports feeling the baby moving  Review of Systems   Review of Systems   Constitutional: Negative for fever  Respiratory: Negative  Cardiovascular: Negative  Gastrointestinal: Negative  Negative for abdominal pain, nausea and vomiting  Genitourinary: Positive for dysuria and frequency  Negative for flank pain and hematuria  Neurological: Negative  Current Medications       Current Outpatient Medications:     amoxicillin (AMOXIL) 500 mg capsule, Take 1 capsule (500 mg total) by mouth every 12 (twelve) hours for 7 days, Disp: 14 capsule, Rfl: 0    Prenatal Vit-Fe Fumarate-FA (PRENATAL VITAMIN PO), Take by mouth, Disp: , Rfl:     Current Allergies     Allergies as of 2019 - Reviewed 2019   Allergen Reaction Noted    Bee venom Anaphylaxis 2016    Tobramycin  05/10/2019            The following portions of the patient's history were reviewed and updated as appropriate: allergies, current medications, past family history, past medical history, past social history, past surgical history and problem list      Past Medical History:   Diagnosis Date     alloimmune thrombocytopenia     Obesity complicating pregnancy in second trimester 2019    Patient denies medical problems     Varicella        Past Surgical History:   Procedure Laterality Date    ABDOMINAL SURGERY       2 years ago     SECTION      CHOLECYSTECTOMY          CHOLECYSTECTOMY LAPAROSCOPIC N/A 2017    Procedure: CHOLECYSTECTOMY LAPAROSCOPIC;  Surgeon: Skylar Licea MD;  Location[facility];  Service: General       Family History   Problem Relation Age of Onset    Arthritis Mother     Diabetes Mother         insulin dependent diabetes mellitus    Thyroid disease Mother    Naga Bernstein Anemia Mother     Thalassemia Mother     Kidney disease Mother     Hypertension Father     No Known Problems Brother     Lung cancer Maternal Grandmother     Diabetes Paternal Uncle         insulin dependent diabetes mellitus    No Known Problems Son     Cancer Paternal Grandfather          Medications have been verified          Objective   /75   Pulse 87   Temp (!) 96 9 °F (36 1 °C) (Temporal)   Resp 20   Ht 5' 6" (1 676 m)   Wt 101 kg (222 lb 6 oz)   LMP 2019 (Exact Date)   SpO2 97%   BMI 35 89 kg/m²        Physical Exam     Physical Exam   Constitutional: She is oriented to person, place, and time  She appears well-developed and well-nourished  No distress  HENT:   Head: Normocephalic and atraumatic  Eyes: Pupils are equal, round, and reactive to light  Conjunctivae and EOM are normal    Neck: Normal range of motion  Neck supple  Cardiovascular: Normal rate  Pulmonary/Chest: Effort normal and breath sounds normal  No respiratory distress  She has no wheezes  She has no rales  She exhibits no tenderness  Abdominal: Soft  Bowel sounds are normal  She exhibits no distension and no mass  There is no tenderness  There is no rebound and no guarding  Abdomen gravid, no CVA tenderness   Musculoskeletal: Normal range of motion  She exhibits no edema, tenderness or deformity  Lymphadenopathy:     She has no cervical adenopathy  Neurological: She is alert and oriented to person, place, and time  Skin: Skin is warm and dry  No rash noted  No erythema  No pallor  Psychiatric: She has a normal mood and affect  Nursing note and vitals reviewed

## 2019-07-28 LAB — BACTERIA UR CULT: NORMAL

## 2019-07-30 ENCOUNTER — ROUTINE PRENATAL (OUTPATIENT)
Dept: PERINATAL CARE | Facility: OTHER | Age: 24
End: 2019-07-30
Payer: COMMERCIAL

## 2019-07-30 VITALS
SYSTOLIC BLOOD PRESSURE: 117 MMHG | BODY MASS INDEX: 37.09 KG/M2 | DIASTOLIC BLOOD PRESSURE: 78 MMHG | HEIGHT: 65 IN | HEART RATE: 84 BPM | WEIGHT: 222.6 LBS

## 2019-07-30 DIAGNOSIS — O99.212 MATERNAL OBESITY, ANTEPARTUM, SECOND TRIMESTER: ICD-10-CM

## 2019-07-30 DIAGNOSIS — Z36.86 ENCOUNTER FOR ANTENATAL SCREENING FOR CERVICAL LENGTH: ICD-10-CM

## 2019-07-30 DIAGNOSIS — O09.292 HISTORY OF FETAL ABNORMALITY IN PREVIOUS PREGNANCY, CURRENTLY PREGNANT, SECOND TRIMESTER: Primary | ICD-10-CM

## 2019-07-30 DIAGNOSIS — Z34.81 PRENATAL CARE, SUBSEQUENT PREGNANCY, FIRST TRIMESTER: ICD-10-CM

## 2019-07-30 DIAGNOSIS — Z3A.20 20 WEEKS GESTATION OF PREGNANCY: ICD-10-CM

## 2019-07-30 PROCEDURE — 76817 TRANSVAGINAL US OBSTETRIC: CPT | Performed by: OBSTETRICS & GYNECOLOGY

## 2019-07-30 PROCEDURE — 76811 OB US DETAILED SNGL FETUS: CPT | Performed by: OBSTETRICS & GYNECOLOGY

## 2019-07-30 PROCEDURE — 99243 OFF/OP CNSLTJ NEW/EST LOW 30: CPT | Performed by: OBSTETRICS & GYNECOLOGY

## 2019-07-30 NOTE — PROGRESS NOTES
Thank you very much for your kind referral of Kristina  for fetal ultrasound evaluation and MFM consult at [place] on 2019  Tan Tejeda is a 79-year-old  white female who is currently at 21 and 3/7 weeks gestation by an estimated due date of 2019 which is based upon menstrual dating  Consultation is performed for the indication of a prior baby with severe thrombocytopenia  Her prenatal course so far has been unremarkable  Tan Tejeda has no complaints  She reports fetal movement and denies vaginal bleeding  Screening for gestational diabetes on July 3 revealed a normal one hour post Glucola value of 120 mg/dL  She did not have genetic screening obtained earlier in the pregnancy Tan Tejeda has a history of a prior  section performed at term in  following an uncomplicated prenatal course   section was performed for the indication of fetal intolerance of labor  She delivered a 7 lb 4 oz baby named Daren Chen  A  CBC was obtained for the indication of suspected sepsis  Severe thrombocytopenia was identified, with a platelet count of 26,011  Petechiae were noted in the groin, axilla, and back  The baby was treated with IVIG and platelet transfusions  Head ultrasound was normal, with no suspicion of intracranial hemorrhage  The platelet count on discharge was 126,000   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  A lab work up for Tan Tejeda and her partner, Juan Ohara,  was initiated through The [address] [place] [address] for NAIT on 2015, though an incorrect laboratory request was ordered, and the correct NAIT work up was not completed  Her son is currently healthy  Tan Tejeda has a new partner in this pregnancy, Meenakshi Patricia  Her past OB history is otherwise significant for a first trimester therapeutic   Her current BMI is 36 9    Her past surgical history is otherwise significant for a laparoscopic cholecystectomy  Jossie Montero takes no medication with the exception of a prenatal vitamin on a daily basis  She has no known drug allergy  She smokes about 1/4 pack cigarettes per day but denies alcohol or illicit drug use during the pregnancy     She has  an uncle with mental retardation  The family genetic history is otherwise negative with respect to genetic abnormalities, birth defects, or mental retardation  Her mom has chronic kidney disease though Jossie Montero does not know the underlying etiology  Her mom has diabetes and hypertension  Her dad has hypertension  There is no first-degree family member with a diagnosis of venous thromboembolism or preeclampsia  No fetal structural abnormality or ultrasound marker for aneuploidy is identified on the Level II ultrasound study today   The fetal brain appears normal   There is no suspicion of intracranial hemorrhage  Several anatomic targets are suboptimally imaged secondary to the constraints related to maternal body habitus and unfavorable fetal position  Fetal growth and amniotic fluid volume are normal   The placenta is normal in appearance  The cervix is normal in appearance by transvaginal sonography  The cervical length is normal   Cervical debris is not present  Cervical funneling is not present  Neither provocative nor dynamic change is appreciated  There is no suspicion of vasa previa using color Doppler evaluation  Today's ultrasound findings and suggested follow-up were discussed in detail with Jossie Montero and her partner  We discussed that prenatal ultrasound cannot rule out all congenital abnormalities  Her gestational diabetes screening result was discussed  Repeat screening for gestational diabetes is recommended at about 28 weeks gestation  I recommended that Jossie Montero and her partner have a laboratory work up for fetal and  alloimmune thrombocytopenia (FNAIT), which will be obtained in [place] within the next week    Demonstration of maternal-paternal human platelet antigen (HPA) incompatibility plus maternal anti HPA antibody to the incompatible antigen is diagnostic of FNAIT  FNAIT occurs when fetal platelets contain a paternal antigen (most commonly human platelet antigen NET-6V) that the mother lacks  Formation of maternal IgG antibodies against this antigen, followed by transplacental passage and binding to fetal platelets, results in fetal/ thrombocytopenia when the antibody-bound platelets are cleared  She will return for follow up [facility] ultrasound evaluation later in the second trimester to assess interval growth and evaluate anatomic targets not imaged well today  Additional recommendations will depend, in part, upon the workup for FNAIT  Maternal obesity is associated with an increased risk for adverse pregnancy outcomes, including gestational diabetes, fetal growth abnormalities including macrosomia, fetal structural abnormalities, preeclampsia, venous thromboembolism, stillbirth, and increased likelihood for  section  Serial fetal growth evaluations are recommended during the second half of the pregnancy  Weekly non stress testing is recommended for additional pregnancy surveillance beginning at 36 weeks gestation, sooner if otherwise indicated, with a BMI of 40 or greater    The face to face time, in addition to time spent discussing ultrasound results, was approximately 40 minutes, greater than 50% of which was spent during counseling and coordination of care

## 2019-07-30 NOTE — LETTER
August 4, 2019     Johnna Lantigua MD  [address] [place] [id number]    Patient: Radha Beaver   YOB: 1995   Date of Visit: 7/30/2019       Dear Dr Janny Izaguirre: Thank you for referring Amadou Taylor to me for evaluation  Below are my notes for this consultation  If you have questions, please do not hesitate to call me  I look forward to following your patient along with you  Sincerely,        Sara Villegas MD        CC: No Recipients  Sara Villegas MD  7/30/2019  3:31 PM  Sign at close encounter  Please refer to the [facility] ultrasound report in Ob Procedures for additional information regarding the visit to the [facility]  today

## 2019-08-04 PROBLEM — O99.212 MATERNAL OBESITY, ANTEPARTUM, SECOND TRIMESTER: Status: ACTIVE | Noted: 2019-08-04

## 2019-08-04 PROBLEM — O09.292 HISTORY OF FETAL ABNORMALITY IN PREVIOUS PREGNANCY, CURRENTLY PREGNANT, SECOND TRIMESTER: Status: ACTIVE | Noted: 2019-08-04

## 2019-08-05 ENCOUNTER — LAB (OUTPATIENT)
Dept: LAB | Facility: HOSPITAL | Age: 24
End: 2019-08-05
Attending: OBSTETRICS & GYNECOLOGY
Payer: COMMERCIAL

## 2019-08-05 ENCOUNTER — TRANSCRIBE ORDERS (OUTPATIENT)
Dept: LAB | Facility: HOSPITAL | Age: 24
End: 2019-08-05

## 2019-08-05 ENCOUNTER — TELEPHONE (OUTPATIENT)
Dept: PERINATAL CARE | Facility: CLINIC | Age: 24
End: 2019-08-05

## 2019-08-05 PROCEDURE — 81112 HPA-15 GENOTYPING: CPT

## 2019-08-05 PROCEDURE — 81109 HPA-5 GENOTYPING: CPT

## 2019-08-05 PROCEDURE — 81105 HPA-1 GENOTYPING: CPT

## 2019-08-05 PROCEDURE — 36415 COLL VENOUS BLD VENIPUNCTURE: CPT

## 2019-08-05 PROCEDURE — 81110 HPA-6 GENOTYPING: CPT

## 2019-08-05 PROCEDURE — 81108 HPA-4 GENOTYPING: CPT

## 2019-08-05 PROCEDURE — 81107 HPA-3 GENOTYPING: CPT

## 2019-08-05 PROCEDURE — 86022 PLATELET ANTIBODIES: CPT

## 2019-08-05 PROCEDURE — 81106 HPA-2 GENOTYPING: CPT

## 2019-08-05 PROCEDURE — 81111 HPA-9 GENOTYPING: CPT

## 2019-08-05 NOTE — TELEPHONE ENCOUNTER
Spoke with Maggie ,  NAIT orders completed by Dr Raghu Mai were  faxed and Isabelle Deras also reviewed with genetic counselor Efra Murray today  Lab will be notified of specimen handling instructions by Isabelle Dallas aware [facility] outpatient lab has lab orders for NAIT screening for her and Brenda Sanchez, she states they will be in around 3pm for blood draw

## 2019-08-06 ENCOUNTER — TELEPHONE (OUTPATIENT)
Dept: OBGYN CLINIC | Facility: MEDICAL CENTER | Age: 24
End: 2019-08-06

## 2019-08-06 NOTE — TELEPHONE ENCOUNTER
Returned pt call, pt is  21w 3d, pt stated when having intercourse has burning when urinating, pt also sx of vaginal itching, odor and white creamy discharge  Pt stated she went to urgent care and they gave her amoxicillin, but its not working  [facility]  Pt has appt for   Please advise

## 2019-08-06 NOTE — TELEPHONE ENCOUNTER
patient contacted and advised as directed   Pt rescheduled for Friday 08/16 due to no vehicle and works its the only time she can come in at 3 pm arrival 2:45pm

## 2019-08-06 NOTE — TELEPHONE ENCOUNTER
Patient placed an online request for an appointment for a possible yeast infection where the antibiotics aren't working  I called and scheduled an appointment for her on Friday 8/9  Since patient is pregnant, if someone could please call and speak to her about her symptoms

## 2019-08-13 ENCOUNTER — TELEPHONE (OUTPATIENT)
Dept: OBGYN CLINIC | Facility: CLINIC | Age: 24
End: 2019-08-13

## 2019-08-13 ENCOUNTER — TELEPHONE (OUTPATIENT)
Dept: PERINATAL CARE | Facility: CLINIC | Age: 24
End: 2019-08-13

## 2019-08-13 PROBLEM — D69.59: Status: ACTIVE | Noted: 2019-08-13

## 2019-08-13 PROBLEM — O99.119: Status: ACTIVE | Noted: 2019-08-13

## 2019-08-13 LAB — MISCELLANEOUS LAB TEST RESULT: NORMAL

## 2019-08-13 NOTE — TELEPHONE ENCOUNTER
I spoke to [facility] this evening with the results from the  alloimmune thrombocytopenia (NAIT) work up  The platelet typing studies, together with the serologic results support a diagnosis of NAIT due to an incompatibility for HPA-1a with Ivet and her partner  We discussed that NAIT is associated with a significant risk for morbidity during this pregnancy, including fetal intracranial hemorrhage  I recommended that she initiate treatment within the next few days with IVIG  We will arrange an appointment for IVIG infusion, which will then be repeated on a weekly basis  I, or one of my office staff, will call [facility] tomorrow to let her know when her initial infusion has been scheduled

## 2019-08-13 NOTE — TELEPHONE ENCOUNTER
My Chart is telling the patient she has lab results that are ready and she was looking to see if someone could read them to her    Best number ending in: [phone]  Availability: free today

## 2019-08-13 NOTE — TELEPHONE ENCOUNTER
Patient left message through my chart requesting lab results  Patient notified that lab is still pending

## 2019-08-13 NOTE — TELEPHONE ENCOUNTER
Results are from an external lab - not yet reviewed by the provider  Advised pt that I will send to the provider to review and advise

## 2019-08-13 NOTE — TELEPHONE ENCOUNTER
Patient is HPA-1a Antibody positive, putting her at risk for having a baby with NAIT  We should have patient discuss this with Perinatology

## 2019-08-13 NOTE — TELEPHONE ENCOUNTER
Patient is aware of the results  Has no future appointments scheduled with Perinatology  Sending information to them to contact patient to make an appt

## 2019-08-13 NOTE — PROGRESS NOTES
I received results from the  alloimmune thrombocytopenia (NAIT) workup for [facility]  The platelet typing studies, together with the serologic results support a diagnosis of NAIT due to an incompatibility for HPA-1a  I spoke with [facility] by phone this morning and discuss the results and recommended treatment  Recommended therapy is intravenous immunoglobulin (IVIG) 2 grams/kilogram per week, with addition of prednisone (0 5 milligrams/kilogram per day) at 32 weeks gestation  A recommendation will then be made to deliver by  section at 37 to 38 weeks without amniocentesis  [facility] and I discussed that, if possible, treatment should be initiated within the next few days  I will work on getting an initial appointment scheduled in the infusion center for the first IVIG treatment

## 2019-08-14 ENCOUNTER — DOCUMENTATION (OUTPATIENT)
Dept: PERINATAL CARE | Facility: CLINIC | Age: 24
End: 2019-08-14

## 2019-08-14 ENCOUNTER — TELEPHONE (OUTPATIENT)
Dept: OBGYN CLINIC | Facility: CLINIC | Age: 24
End: 2019-08-14

## 2019-08-14 ENCOUNTER — TELEPHONE (OUTPATIENT)
Dept: LABOR AND DELIVERY | Facility: HOSPITAL | Age: 24
End: 2019-08-14

## 2019-08-14 RX ORDER — SODIUM CHLORIDE 9 MG/ML
20 INJECTION, SOLUTION INTRAVENOUS ONCE
Status: CANCELLED | OUTPATIENT
Start: 2019-08-15

## 2019-08-14 RX ORDER — ACETAMINOPHEN 325 MG/1
650 TABLET ORAL ONCE
Status: CANCELLED | OUTPATIENT
Start: 2019-08-15

## 2019-08-14 RX ORDER — DIPHENHYDRAMINE HCL 25 MG
50 TABLET ORAL ONCE
Status: CANCELLED | OUTPATIENT
Start: 2019-08-15

## 2019-08-14 NOTE — TELEPHONE ENCOUNTER
Set up patient's IVIG infusion plan to begin tomorrow 8/15/19 at 0930  Pt will go weekly for IVIG (2g/kg/week)  Relayed this information to patient  She will present to [facility], 2nd floor at 0930  Notified patient that at [address] gestation she will have prednisone (0 5mg/kg/day) added to her plan  Pt inquired about timing of delivery  Reviewed that current recommendation is via c/s at 37-38wk  Pt states she has an appointment with Dr Karla Stanley on 28th  Will relay information to Dr Karla Chappell MD  OBN, PGY-3  8/14/2019 10:13 AM

## 2019-08-14 NOTE — TELEPHONE ENCOUNTER
Domonique Gibbs from infusion called said pt needs an auth for infusion, resident at hospital ordered this along with mfm, they are working on the [facility], per adry

## 2019-08-14 NOTE — PROGRESS NOTES
I received prior auth papers from [employer] from [place] to be completed by Dr Otto Ovalle for Igg rx for Gregoria Rose  Dr Otto Ovalle completed the auth forms and they were faxed to [facility] along with the ultrasound report and copy of insurance  Sathish Matthew has the forms that were faxed in her office   We will notify the pt upon approval

## 2019-08-15 ENCOUNTER — TELEPHONE (OUTPATIENT)
Dept: PERINATAL CARE | Facility: CLINIC | Age: 24
End: 2019-08-15

## 2019-08-15 ENCOUNTER — HOSPITAL ENCOUNTER (OUTPATIENT)
Dept: INFUSION CENTER | Facility: HOSPITAL | Age: 24
End: 2019-08-15

## 2019-08-15 ENCOUNTER — HOSPITAL ENCOUNTER (EMERGENCY)
Facility: HOSPITAL | Age: 24
Discharge: HOME | End: 2019-08-15
Attending: EMERGENCY MEDICINE | Admitting: EMERGENCY MEDICINE
Payer: COMMERCIAL

## 2019-08-15 ENCOUNTER — DOCUMENTATION (OUTPATIENT)
Dept: PERINATAL CARE | Facility: CLINIC | Age: 24
End: 2019-08-15

## 2019-08-15 ENCOUNTER — HOSPITAL ENCOUNTER (OUTPATIENT)
Dept: INFUSION CENTER | Facility: HOSPITAL | Age: 24
Discharge: HOME | End: 2019-08-15
Payer: COMMERCIAL

## 2019-08-15 VITALS
RESPIRATION RATE: 24 BRPM | SYSTOLIC BLOOD PRESSURE: 133 MMHG | DIASTOLIC BLOOD PRESSURE: 69 MMHG | WEIGHT: 225.31 LBS | HEART RATE: 101 BPM | OXYGEN SATURATION: 100 % | BODY MASS INDEX: 37.49 KG/M2 | TEMPERATURE: 97.8 F

## 2019-08-15 VITALS
OXYGEN SATURATION: 100 % | HEART RATE: 97 BPM | DIASTOLIC BLOOD PRESSURE: 76 MMHG | RESPIRATION RATE: 18 BRPM | TEMPERATURE: 97.6 F | SYSTOLIC BLOOD PRESSURE: 130 MMHG

## 2019-08-15 DIAGNOSIS — R06.2 WHEEZING: ICD-10-CM

## 2019-08-15 DIAGNOSIS — T78.40XA ALLERGIC REACTION, INITIAL ENCOUNTER: Primary | ICD-10-CM

## 2019-08-15 DIAGNOSIS — D69.59 ALLOIMMUNE THROMBOCYTOPENIA DURING PREGNANCY, ANTEPARTUM: ICD-10-CM

## 2019-08-15 DIAGNOSIS — O99.119 ALLOIMMUNE THROMBOCYTOPENIA DURING PREGNANCY, ANTEPARTUM: ICD-10-CM

## 2019-08-15 DIAGNOSIS — Z76.2: Primary | ICD-10-CM

## 2019-08-15 PROCEDURE — 96365 THER/PROPH/DIAG IV INF INIT: CPT

## 2019-08-15 PROCEDURE — 96375 TX/PRO/DX INJ NEW DRUG ADDON: CPT

## 2019-08-15 PROCEDURE — 94640 AIRWAY INHALATION TREATMENT: CPT

## 2019-08-15 PROCEDURE — 99284 EMERGENCY DEPT VISIT MOD MDM: CPT | Performed by: EMERGENCY MEDICINE

## 2019-08-15 PROCEDURE — 96366 THER/PROPH/DIAG IV INF ADDON: CPT

## 2019-08-15 PROCEDURE — 99283 EMERGENCY DEPT VISIT LOW MDM: CPT

## 2019-08-15 RX ORDER — SODIUM CHLORIDE 9 MG/ML
20 INJECTION, SOLUTION INTRAVENOUS ONCE
Status: CANCELLED | OUTPATIENT
Start: 2019-08-19

## 2019-08-15 RX ORDER — ACETAMINOPHEN 325 MG/1
650 TABLET ORAL ONCE
Status: CANCELLED | OUTPATIENT
Start: 2019-08-19

## 2019-08-15 RX ORDER — DIPHENHYDRAMINE HYDROCHLORIDE 50 MG/ML
25 INJECTION INTRAMUSCULAR; INTRAVENOUS ONCE
Status: COMPLETED | OUTPATIENT
Start: 2019-08-15 | End: 2019-08-15

## 2019-08-15 RX ORDER — DIPHENHYDRAMINE HCL 25 MG
50 TABLET ORAL ONCE
Status: COMPLETED | OUTPATIENT
Start: 2019-08-15 | End: 2019-08-15

## 2019-08-15 RX ORDER — ACETAMINOPHEN 325 MG/1
650 TABLET ORAL ONCE
Status: COMPLETED | OUTPATIENT
Start: 2019-08-15 | End: 2019-08-15

## 2019-08-15 RX ORDER — ALBUTEROL SULFATE 2.5 MG/3ML
2.5 SOLUTION RESPIRATORY (INHALATION) ONCE
Status: COMPLETED | OUTPATIENT
Start: 2019-08-15 | End: 2019-08-15

## 2019-08-15 RX ORDER — SODIUM CHLORIDE 9 MG/ML
20 INJECTION, SOLUTION INTRAVENOUS ONCE
Status: COMPLETED | OUTPATIENT
Start: 2019-08-15 | End: 2019-08-15

## 2019-08-15 RX ORDER — DIPHENHYDRAMINE HCL 25 MG
50 TABLET ORAL ONCE
Status: CANCELLED | OUTPATIENT
Start: 2019-08-19

## 2019-08-15 RX ADMIN — ACETAMINOPHEN 650 MG: 325 TABLET ORAL at 11:58

## 2019-08-15 RX ADMIN — SODIUM CHLORIDE 20 ML/HR: 0.9 INJECTION, SOLUTION INTRAVENOUS at 11:55

## 2019-08-15 RX ADMIN — SODIUM CHLORIDE 1000 ML/HR: 0.9 INJECTION, SOLUTION INTRAVENOUS at 17:05

## 2019-08-15 RX ADMIN — DIPHENHYDRAMINE HCL 50 MG: 25 TABLET, COATED ORAL at 11:58

## 2019-08-15 RX ADMIN — DIPHENHYDRAMINE HYDROCHLORIDE 25 MG: 50 INJECTION, SOLUTION INTRAMUSCULAR; INTRAVENOUS at 17:02

## 2019-08-15 RX ADMIN — Medication 202.5 G: at 13:18

## 2019-08-15 RX ADMIN — ALBUTEROL SULFATE 2.5 MG: 2.5 SOLUTION RESPIRATORY (INHALATION) at 17:58

## 2019-08-15 NOTE — DISCHARGE INSTRUCTIONS
Call infusion center to schedule future appointments for infusions      Follow-up with Dr Dimitri Bell as needed    Return to the emergency department if you developed difficulty breathing, or sensation like your airway or tongue or swelling

## 2019-08-15 NOTE — PROGRESS NOTES
Kostas Doshi called [facility] nurse line, very upset appointment  for IVIG had been cancelled today due to insurance authorization needed  Patient states Dr Mira Noonan told her she needs infusion asap, she took day off from work today to have procedure  Apologized to patient on behalf of network and told her [facility] office will investigate and help her get appointment set up asap  Phone call placed to [facility] # [phone] by Tyree Collado, prior auth request was sent 8/14/19 and clinicals faxed, spoke with Kb Chan, requested urgent authorization  Per Kb Chan request will be reviewed for determination, however due to urgency of pregnancy status this procedure can be done today and covered from date of request sent  Per Phone call to [facility] Infusion center #[id number] appointment cannot be rescheduled b/c was cancelled by pre encounter representative  Phone call to pre encounter representative supervisor Sierra Ortiz  Ext #[phone] reviewed above  Clinical data reviewed per Dr Sivan Groves notes  Pre encounter  she will call [facility] infusion center and update so that appointment can be rescheduled asap  Phone call to patient and told to expect call from infusion center today  Phone call from Thuy to [facility]-  patient will be brought in this am for infusion

## 2019-08-15 NOTE — TELEPHONE ENCOUNTER
[facility] nurse line received phone call from nurse Oralia Barcenas at [facility] infusion center  Patient had her first IVIG infusion today  Orders are for weekly therapy but due to patient's work schedule she requests only Monday appointments  Oralia Barcenas @ Infusion center requested [facility] doctor approve this change in schedule for the next therapy session be administered on Monday 8/26  [facility] to call [facility] infusion manager Tish Hurd ext # [phone] to follow up  Patient does currently have appointment for next Thursday 8/22/19 if necessary  Call routed to Dr Luis Hardin to review change in therapy day

## 2019-08-15 NOTE — ED PROVIDER NOTES
History  Chief Complaint   Patient presents with    Allergic Reaction     pt was at infusion center getting igg infusion  pt was about nursing home done and started experiencing itching and her throat needing to be cleared  Benadryl given outpatient  26-year-old female past medical history significant for NAIT presents from the infusion center for evaluation of possible allergic reaction  Patient states she was receiving an IVIG infusion as she is 22 weeks and 5 days pregnant and has a diagnosis of NAIT  Roughly nursing home through the infusion, patient states that she started to feel a itchy feeling in the back of her throat, associated with a feeling of tightness in the same region and a red rash most notably on the patient's face  She denied any difficulty breathing  Patient denies any headache, fever, chills, chest pain, palpitations, shortness of breath, abdominal pain, nausea, vomiting, diarrhea  This is her 1st time receiving an infusion  She was given 25 mg of IV Benadryl prior to arrival in the emergency department  Prior to Admission Medications   Prescriptions Last Dose Informant Patient Reported? Taking?    Prenatal Vit-Fe Fumarate-FA (PRENATAL VITAMIN PO)  Self Yes No   Sig: Take by mouth      Facility-Administered Medications: None       Past Medical History:   Diagnosis Date     alloimmune thrombocytopenia     Obesity complicating pregnancy in second trimester 2019    Patient denies medical problems     Varicella        Past Surgical History:   Procedure Laterality Date    ABDOMINAL SURGERY       2 years ago     SECTION      CHOLECYSTECTOMY          CHOLECYSTECTOMY LAPAROSCOPIC N/A 2017    Procedure: CHOLECYSTECTOMY LAPAROSCOPIC;  Surgeon: Mervat Caicedo MD;  Location: [facility];  Service: General       Family History   Problem Relation Age of Onset    Arthritis Mother     Diabetes Mother         insulin dependent diabetes mellitus    Thyroid disease Mother     Anemia Mother     Thalassemia Mother     Kidney disease Mother     Hypertension Father     No Known Problems Brother     Lung cancer Maternal Grandmother     Diabetes Paternal Uncle         insulin dependent diabetes mellitus    No Known Problems Son     Cancer Paternal Grandfather      I have reviewed and agree with the history as documented  Social History     Tobacco Use    Smoking status: Current Some Day Smoker     Packs/day: 0 50    Smokeless tobacco: Never Used   Substance Use Topics    Alcohol use: No     Frequency: Never    Drug use: No        Review of Systems   Constitutional: Negative for chills, fatigue and fever  HENT: Negative for ear discharge, ear pain, facial swelling, postnasal drip, rhinorrhea, sinus pressure, sinus pain, sneezing, sore throat, tinnitus, trouble swallowing and voice change  Eyes: Negative for photophobia, pain, discharge, redness, itching and visual disturbance  Respiratory: Negative for cough, choking, chest tightness, shortness of breath, wheezing and stridor  Cardiovascular: Negative for chest pain, palpitations and leg swelling  Gastrointestinal: Negative for abdominal pain, diarrhea, nausea, rectal pain and vomiting  Genitourinary: Negative for dysuria, enuresis, flank pain and hematuria  Musculoskeletal: Negative for arthralgias, back pain, gait problem, joint swelling, myalgias, neck pain and neck stiffness  Skin: Positive for rash  Negative for color change, pallor and wound  Neurological: Negative for dizziness, tremors, seizures, syncope, facial asymmetry, weakness, light-headedness, numbness and headaches  Hematological: Negative  Psychiatric/Behavioral: Negative for agitation and hallucinations  The patient is not nervous/anxious          Physical Exam  ED Triage Vitals   Temperature Pulse Respirations Blood Pressure SpO2   08/15/19 1715 08/15/19 1715 08/15/19 1715 08/15/19 1715 08/15/19 1715   97 6 °F (36 4 °C) 90 20 124/65 100 %      Temp src Heart Rate Source Patient Position - Orthostatic VS BP Location FiO2 (%)   -- 08/15/19 1900 08/15/19 1715 08/15/19 1715 --    Monitor Lying Right arm       Pain Score       --                    Orthostatic Vital Signs  Vitals:    08/15/19 1730 08/15/19 1800 08/15/19 1900 08/15/19 1935   BP: 119/62 118/63 123/56 130/76   Pulse: 80 80 95 97   Patient Position - Orthostatic VS: Lying Lying Lying Lying       Physical Exam   Constitutional: She is oriented to person, place, and time  She appears well-developed and well-nourished  HENT:   Head: Normocephalic and atraumatic  Nose: Nose normal    Mouth/Throat: Oropharynx is clear and moist    Face appears flushed   Eyes: Pupils are equal, round, and reactive to light  Conjunctivae and EOM are normal  Right eye exhibits no discharge  Left eye exhibits no discharge  Neck: Normal range of motion  Neck supple  No JVD present  Cardiovascular: Normal rate, regular rhythm, normal heart sounds and intact distal pulses  Exam reveals no gallop and no friction rub  No murmur heard  Pulmonary/Chest: Effort normal  No accessory muscle usage or stridor  No tachypnea  No respiratory distress  She has wheezes in the right upper field and the left upper field  Abdominal: Soft  Bowel sounds are normal  She exhibits no distension  There is no tenderness  There is no guarding  Musculoskeletal: Normal range of motion  She exhibits no edema, tenderness or deformity  Lymphadenopathy:     She has no cervical adenopathy  Neurological: She is alert and oriented to person, place, and time  No cranial nerve deficit  Skin: Skin is warm and dry  Capillary refill takes less than 2 seconds  She is not diaphoretic  No erythema  Psychiatric: She has a normal mood and affect  Her behavior is normal  Judgment and thought content normal    Nursing note and vitals reviewed        ED Medications  Medications   albuterol inhalation solution 2 5 mg (2 5 mg Nebulization Given 8/15/19 1758)       Diagnostic Studies  Results Reviewed     None                 No orders to display         Procedures  Procedures        ED Course  ED Course as of Aug 15 2249   Thu Aug 15, 2019   1802 Blood Pressure: 119/62   1802 Pulse: 80   1802 Respirations: 20   1802 SpO2: 99 %                               MDM  Number of Diagnoses or Management Options  Allergic reaction, initial encounter:   Wheezing:   Diagnosis management comments: 40-year-old female 22 weeks pregnant presents for evaluation of possible allergic reaction from an IVIG infusion  Patient noted to be wheezing on physical exam, but vitals are within normal limits  Will give nebulized albuterol treatment and reassess  Wheezing resolved with nebulized treatment  Patient currently asymptomatic  Spoke to Dr Raghu Mai from [facility] who stated that the patient can call the infusion center tomorrow to finish her treatment or continue with her already previously scheduled treatment next week  She is hemodynamically stable for discharge  Patient given strict return precautions  Disposition  Final diagnoses: Allergic reaction, initial encounter   Wheezing     Time reflects when diagnosis was documented in both MDM as applicable and the Disposition within this note     Time User Action Codes Description Comment    8/15/2019  7:28 PM Check, Prince BackOps Add [T78 40XA] Allergic reaction, initial encounter     8/15/2019  7:28 PM Check, Prince BackOps Add [R06 2] Wheezing       ED Disposition     ED Disposition Condition Date/Time Comment    Discharge Stable Thu Aug 15, 2019  7:28 PM Chucho Richards discharge to home/self care              Follow-up Information     Follow up With Specialties Details Why Conrad Machado MD Maternal and Fetal Medicine, Obstetrics, Obstetrics and Gynecology, Gynecology Call  As needed [address] [facility] [address] [facility]  [phone]            Discharge Medication List as of 8/15/2019  7:32 PM      CONTINUE these medications which have NOT CHANGED    Details   Prenatal Vit-Fe Fumarate-FA (PRENATAL VITAMIN PO) Take by mouth, Historical Med           No discharge procedures on file  ED Provider  Attending physically available and evaluated Vinh Her I managed the patient along with the ED Attending      Electronically Signed by         Claudio Barnes MD  08/15/19 7200

## 2019-08-15 NOTE — TELEPHONE ENCOUNTER
Infusion center called; pt having allergic reaction to IVIG (itchy throat, no SOB); took call for Dr Mira Noonan; pt satting 98%, vitals are the same as when infusion started; I advised benadryl 25mg IV stat; nurse will try to get the medication ASAP but ED may come get her first   Will alert OB team as well as Dr Mira Noonan  Report called to Dr Jakob Barcenas MD

## 2019-08-16 ENCOUNTER — ROUTINE PRENATAL (OUTPATIENT)
Dept: OBGYN CLINIC | Facility: CLINIC | Age: 24
End: 2019-08-16

## 2019-08-16 ENCOUNTER — DOCUMENTATION (OUTPATIENT)
Dept: PERINATAL CARE | Facility: CLINIC | Age: 24
End: 2019-08-16

## 2019-08-16 ENCOUNTER — TELEPHONE (OUTPATIENT)
Dept: PERINATAL CARE | Facility: CLINIC | Age: 24
End: 2019-08-16

## 2019-08-16 VITALS — WEIGHT: 221 LBS | SYSTOLIC BLOOD PRESSURE: 112 MMHG | BODY MASS INDEX: 36.78 KG/M2 | DIASTOLIC BLOOD PRESSURE: 62 MMHG

## 2019-08-16 DIAGNOSIS — D69.59 ALLOIMMUNE THROMBOCYTOPENIA DURING PREGNANCY, ANTEPARTUM: ICD-10-CM

## 2019-08-16 DIAGNOSIS — O99.119 ALLOIMMUNE THROMBOCYTOPENIA DURING PREGNANCY, ANTEPARTUM: ICD-10-CM

## 2019-08-16 DIAGNOSIS — Z76.2: ICD-10-CM

## 2019-08-16 PROCEDURE — PNV: Performed by: PHYSICIAN ASSISTANT

## 2019-08-16 NOTE — PROGRESS NOTES
Alloimmune thrombocytopenia during pregnancy, antepartum  Experienced allergic rxn while receiving first IVIG infusion - was evaluated in ED, vitals stable and subsequently d/c  Continues w/ IVIG tx w/ pre-dosing of Benadryl    Called today as was upset she did not hear FHT when evaluated in ED - wants reassurance; good FM   No ctx, bleeding, LOF    Will cont w/ monthly growth scans w/ MFM    Given work note for restrictions - has job involving manual labor, provided lifting restriction and to allow for frequent breaks    Keep next sched appt  Reviewed reasons to call

## 2019-08-16 NOTE — PROGRESS NOTES
I called [facility] [employer] auth  to verify status of auth, spoke to Pooja she informed me that it is still on review, I also asked her if they can change the request to her primary insurance which is also [employer] but a different ID number and is under patient, Pooja said she talked to case management and that they will make the change and do it under patient's primary

## 2019-08-16 NOTE — TELEPHONE ENCOUNTER
Brayden Luz called [facility] nurse line questioning next infusion appointment, how long to expect therapy and requests letter for employer  Phone call to [facility] infusion # [id number], patient has appointment 8/19/19 0930 [facility] infusion center  Going forward she is scheduled 08/26/19 but Labor Day holiday weekend Monday Sept 2nd she may need to be seen at another [facility] infusion center ( possibly [facility] )  Emil Mckeon recommends telling patient to expect 6 hour appointment on 8/19 and requests Dr Luis Hardin order steroid premed and Benadryl mid infusion  Emil Prow will discuss future appointment with patient on 8/19/19  Brayden Luz made aware of appointment for Monday 8/19 0930 [facility] infusion center  Reviewed to expect length of appointment at 6 hours  Aware infusion center will work with her directly on future appointments  Brayden Luz reported her concern baby was not checked in ER yesterday  Encouraged patient to notify OB for fetal heart check today and she made appointment to be seen today

## 2019-08-16 NOTE — ASSESSMENT & PLAN NOTE
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

## 2019-08-17 NOTE — ED ATTENDING ATTESTATION
Robbin Devlin DO, saw and evaluated the patient  I have discussed the patient with the resident/non-physician practitioner and agree with the resident's/non-physician practitioner's findings, Plan of Care, and MDM as documented in the resident's/non-physician practitioner's note, except where noted  All available labs and Radiology studies were reviewed  I was present for key portions of any procedure(s) performed by the resident/non-physician practitioner and I was immediately available to provide assistance  At this point I agree with the current assessment done in the Emergency Department  I have conducted an independent evaluation of this patient a history and physical is as follows:    53-year-old female with h/o NAIT presenting with allergic reaction if she was receiving an infusion of IgG  Patient is currently almost 23 weeks pregnant with her 2nd pregnancy  The patient felt generalized pruritus and a itchy sensation the back of her throat associated with feeling tightness the same reason a red rash in her patient's face and a flushing sensation  This was the 1st time patient received this infusion  Patient was pretreated with Benadryl p o  And then after the reaction was treated with Benadryl IV 25 mg  Patient has slight wheezing upon arrival to the emergency department  Patient was treat with albuterol nebulizer  Patient did not require any other steroids or Benadryl administration  Discussed with patient's maternal fetal medicine physician who stated patient can return tomorrow or Monday to finishing infusion  Patient remains asymptomatic on discharge        Critical Care Time  Procedures

## 2019-08-19 ENCOUNTER — HOSPITAL ENCOUNTER (OUTPATIENT)
Dept: INFUSION CENTER | Facility: HOSPITAL | Age: 24
Discharge: HOME | End: 2019-08-19
Payer: COMMERCIAL

## 2019-08-19 VITALS
TEMPERATURE: 97.9 F | WEIGHT: 223.11 LBS | HEART RATE: 64 BPM | SYSTOLIC BLOOD PRESSURE: 128 MMHG | DIASTOLIC BLOOD PRESSURE: 65 MMHG | RESPIRATION RATE: 18 BRPM | BODY MASS INDEX: 37.13 KG/M2

## 2019-08-19 DIAGNOSIS — O99.119 ALLOIMMUNE THROMBOCYTOPENIA DURING PREGNANCY, ANTEPARTUM: Primary | ICD-10-CM

## 2019-08-19 DIAGNOSIS — D69.59 ALLOIMMUNE THROMBOCYTOPENIA DURING PREGNANCY, ANTEPARTUM: Primary | ICD-10-CM

## 2019-08-19 DIAGNOSIS — O09.292 HISTORY OF FETAL ABNORMALITY IN PREVIOUS PREGNANCY, CURRENTLY PREGNANT, SECOND TRIMESTER: ICD-10-CM

## 2019-08-19 PROCEDURE — 96366 THER/PROPH/DIAG IV INF ADDON: CPT

## 2019-08-19 PROCEDURE — 96375 TX/PRO/DX INJ NEW DRUG ADDON: CPT

## 2019-08-19 PROCEDURE — 96365 THER/PROPH/DIAG IV INF INIT: CPT

## 2019-08-19 RX ORDER — DIPHENHYDRAMINE HCL 25 MG
50 TABLET ORAL ONCE
Status: COMPLETED | OUTPATIENT
Start: 2019-08-19 | End: 2019-08-19

## 2019-08-19 RX ORDER — ACETAMINOPHEN 325 MG/1
650 TABLET ORAL ONCE
Status: COMPLETED | OUTPATIENT
Start: 2019-08-19 | End: 2019-08-19

## 2019-08-19 RX ORDER — DIPHENHYDRAMINE HCL 25 MG
25 CAPSULE ORAL ONCE
Status: CANCELLED
Start: 2019-08-19

## 2019-08-19 RX ORDER — DIPHENHYDRAMINE HCL 25 MG
25 TABLET ORAL ONCE
Status: CANCELLED
Start: 2019-08-26

## 2019-08-19 RX ORDER — SODIUM CHLORIDE 9 MG/ML
20 INJECTION, SOLUTION INTRAVENOUS ONCE
Status: CANCELLED | OUTPATIENT
Start: 2019-08-19

## 2019-08-19 RX ORDER — SODIUM CHLORIDE 9 MG/ML
20 INJECTION, SOLUTION INTRAVENOUS ONCE
Status: CANCELLED | OUTPATIENT
Start: 2019-08-26

## 2019-08-19 RX ORDER — ACETAMINOPHEN 325 MG/1
650 TABLET ORAL ONCE
Status: CANCELLED | OUTPATIENT
Start: 2019-08-26

## 2019-08-19 RX ORDER — ACETAMINOPHEN 325 MG/1
650 TABLET ORAL ONCE
Status: CANCELLED | OUTPATIENT
Start: 2019-08-19

## 2019-08-19 RX ORDER — DIPHENHYDRAMINE HCL 25 MG
50 TABLET ORAL ONCE
Status: CANCELLED | OUTPATIENT
Start: 2019-08-26

## 2019-08-19 RX ORDER — DIPHENHYDRAMINE HCL 25 MG
50 TABLET ORAL ONCE
Status: CANCELLED | OUTPATIENT
Start: 2019-08-19

## 2019-08-19 RX ORDER — DIPHENHYDRAMINE HCL 25 MG
25 TABLET ORAL ONCE
Status: DISCONTINUED | OUTPATIENT
Start: 2019-08-19 | End: 2019-08-22 | Stop reason: HOSPADM

## 2019-08-19 RX ORDER — SODIUM CHLORIDE 9 MG/ML
20 INJECTION, SOLUTION INTRAVENOUS ONCE
Status: COMPLETED | OUTPATIENT
Start: 2019-08-19 | End: 2019-08-19

## 2019-08-19 RX ADMIN — DIPHENHYDRAMINE HCL 50 MG: 25 TABLET, COATED ORAL at 09:58

## 2019-08-19 RX ADMIN — SODIUM CHLORIDE 20 ML/HR: 0.9 INJECTION, SOLUTION INTRAVENOUS at 09:58

## 2019-08-19 RX ADMIN — HYDROCORTISONE SODIUM SUCCINATE 100 MG: 100 INJECTION, POWDER, FOR SOLUTION INTRAMUSCULAR; INTRAVENOUS at 09:58

## 2019-08-19 RX ADMIN — Medication 100 G: at 10:29

## 2019-08-19 RX ADMIN — ACETAMINOPHEN 650 MG: 325 TABLET ORAL at 09:58

## 2019-08-19 NOTE — PLAN OF CARE
Problem: Potential for Falls  Goal: Patient will remain free of falls  Description  INTERVENTIONS:  - Assess patient frequently for physical needs  -  Identify cognitive and physical deficits and behaviors that affect risk of falls    -  [place] fall precautions as indicated by assessment   - Educate patient/family on patient safety including physical limitations  - Instruct patient to call for assistance with activity based on assessment  - Modify environment to reduce risk of injury  - Consider OT/PT consult to assist with strengthening/mobility  Outcome: Progressing

## 2019-08-19 NOTE — PROGRESS NOTES
Pt tolerated treatment today without incident  Observed 1 hour post infusion, pt did not require 2nd dose of benadryl, advised pt to get benadryl for at home in case she has delayed reaction

## 2019-08-20 ENCOUNTER — TELEPHONE (OUTPATIENT)
Dept: PERINATAL CARE | Facility: OTHER | Age: 24
End: 2019-08-20

## 2019-08-20 NOTE — TELEPHONE ENCOUNTER
I spoke with Gulshan Fermin by phone this morning  She tolerated IVIG infusion well yesterday, premedicated with Benadryl and hydrocortisone  She will continue weekly IVIG treatments  She asked if she could move her September 3rd appointment to September 4th because of work related issues, which I approved  She has ultrasound appointment scheduled with [facility] on August 29

## 2019-08-26 ENCOUNTER — HOSPITAL ENCOUNTER (OUTPATIENT)
Dept: INFUSION CENTER | Facility: HOSPITAL | Age: 24
Discharge: HOME | End: 2019-08-26
Payer: COMMERCIAL

## 2019-08-26 VITALS
RESPIRATION RATE: 20 BRPM | DIASTOLIC BLOOD PRESSURE: 59 MMHG | TEMPERATURE: 97.9 F | OXYGEN SATURATION: 99 % | SYSTOLIC BLOOD PRESSURE: 114 MMHG | HEART RATE: 72 BPM

## 2019-08-26 DIAGNOSIS — D69.59 ALLOIMMUNE THROMBOCYTOPENIA DURING PREGNANCY, ANTEPARTUM: ICD-10-CM

## 2019-08-26 DIAGNOSIS — O99.119 ALLOIMMUNE THROMBOCYTOPENIA DURING PREGNANCY, ANTEPARTUM: ICD-10-CM

## 2019-08-26 DIAGNOSIS — Z76.2: Primary | ICD-10-CM

## 2019-08-26 PROCEDURE — 96366 THER/PROPH/DIAG IV INF ADDON: CPT

## 2019-08-26 PROCEDURE — 96375 TX/PRO/DX INJ NEW DRUG ADDON: CPT

## 2019-08-26 PROCEDURE — 96365 THER/PROPH/DIAG IV INF INIT: CPT

## 2019-08-26 RX ORDER — DIPHENHYDRAMINE HCL 25 MG
25 CAPSULE ORAL ONCE
Status: CANCELLED
Start: 2019-09-02

## 2019-08-26 RX ORDER — DIPHENHYDRAMINE HCL 25 MG
50 TABLET ORAL ONCE
Status: CANCELLED | OUTPATIENT
Start: 2019-09-02

## 2019-08-26 RX ORDER — SODIUM CHLORIDE 9 MG/ML
20 INJECTION, SOLUTION INTRAVENOUS ONCE
Status: COMPLETED | OUTPATIENT
Start: 2019-08-26 | End: 2019-08-26

## 2019-08-26 RX ORDER — DIPHENHYDRAMINE HCL 25 MG
50 TABLET ORAL ONCE
Status: COMPLETED | OUTPATIENT
Start: 2019-08-26 | End: 2019-08-26

## 2019-08-26 RX ORDER — ACETAMINOPHEN 325 MG/1
650 TABLET ORAL ONCE
Status: COMPLETED | OUTPATIENT
Start: 2019-08-26 | End: 2019-08-26

## 2019-08-26 RX ORDER — SODIUM CHLORIDE 9 MG/ML
20 INJECTION, SOLUTION INTRAVENOUS ONCE
Status: CANCELLED | OUTPATIENT
Start: 2019-09-02

## 2019-08-26 RX ORDER — DIPHENHYDRAMINE HCL 25 MG
25 TABLET ORAL ONCE
Status: COMPLETED | OUTPATIENT
Start: 2019-08-26 | End: 2019-08-26

## 2019-08-26 RX ORDER — ACETAMINOPHEN 325 MG/1
650 TABLET ORAL ONCE
Status: CANCELLED | OUTPATIENT
Start: 2019-09-02

## 2019-08-26 RX ADMIN — Medication 202.5 G: at 10:49

## 2019-08-26 RX ADMIN — HYDROCORTISONE SODIUM SUCCINATE 100 MG: 100 INJECTION, POWDER, FOR SOLUTION INTRAMUSCULAR; INTRAVENOUS at 09:59

## 2019-08-26 RX ADMIN — DIPHENHYDRAMINE HCL 25 MG: 25 TABLET, COATED ORAL at 13:32

## 2019-08-26 RX ADMIN — DIPHENHYDRAMINE HCL 50 MG: 25 TABLET, COATED ORAL at 09:33

## 2019-08-26 RX ADMIN — ACETAMINOPHEN 650 MG: 325 TABLET ORAL at 09:32

## 2019-08-26 RX ADMIN — SODIUM CHLORIDE 20 ML/HR: 0.9 INJECTION, SOLUTION INTRAVENOUS at 09:49

## 2019-08-26 NOTE — PLAN OF CARE
Problem: Potential for Falls  Goal: Patient will remain free of falls  Description  INTERVENTIONS:  - Assess patient frequently for physical needs  -  Identify cognitive and physical deficits and behaviors that affect risk of falls    -  [place] fall precautions as indicated by assessment   - Educate patient/family on patient safety including physical limitations  - Instruct patient to call for assistance with activity based on assessment  - Modify environment to reduce risk of injury  - Consider OT/PT consult to assist with strengthening/mobility  Outcome: Progressing

## 2019-08-28 NOTE — PATIENT INSTRUCTIONS
Thank you for choosing [address] [place] [address] for your  care today  If you have any questions about your ultrasound or care, please do not hesitate to contact us or your primary obstetrician  Prednisone should begin at 32 weeks

## 2019-08-29 ENCOUNTER — ULTRASOUND (OUTPATIENT)
Dept: PERINATAL CARE | Facility: OTHER | Age: 24
End: 2019-08-29
Payer: COMMERCIAL

## 2019-08-29 VITALS
BODY MASS INDEX: 37.55 KG/M2 | WEIGHT: 225.4 LBS | SYSTOLIC BLOOD PRESSURE: 109 MMHG | DIASTOLIC BLOOD PRESSURE: 55 MMHG | HEIGHT: 65 IN | HEART RATE: 77 BPM

## 2019-08-29 DIAGNOSIS — D69.59 ALLOIMMUNE THROMBOCYTOPENIA DURING PREGNANCY, ANTEPARTUM: Primary | ICD-10-CM

## 2019-08-29 DIAGNOSIS — IMO0002 EVALUATE ANATOMY NOT SEEN ON PRIOR SONOGRAM: ICD-10-CM

## 2019-08-29 DIAGNOSIS — O99.119 ALLOIMMUNE THROMBOCYTOPENIA DURING PREGNANCY, ANTEPARTUM: Primary | ICD-10-CM

## 2019-08-29 PROCEDURE — 76816 OB US FOLLOW-UP PER FETUS: CPT | Performed by: OBSTETRICS & GYNECOLOGY

## 2019-08-29 PROCEDURE — 99212 OFFICE O/P EST SF 10 MIN: CPT | Performed by: OBSTETRICS & GYNECOLOGY

## 2019-08-29 NOTE — PROGRESS NOTES
[address]: Ms Miguel Severin was seen today at 24w5d for followup missed anatomy ultrasound  See ultrasound report under "OB Procedures" tab  Please don't hesitate to contact our office with any concerns or questions    Varinder Higuera MD

## 2019-08-29 NOTE — LETTER
August 29, 2019     Maggie Hoffman[address]  [address] [place] [address]    Patient: Divina Samano   YOB: 1995   Date of Visit: 8/29/2019       Dear Dr Otto Ovalle:    Thank you for referring Blayne Han to me for evaluation  Below are my notes for this consultation  If you have questions, please do not hesitate to call me  I look forward to following your patient along with you  Sincerely,        Johnny Astudillo MD        CC: No Recipients  Johnny Astudillo MD  8/29/2019  3:59 PM  Sign at close encounter  [address]: Ms Agapito Sahu was seen today at 24w5d for followup missed anatomy ultrasound  See ultrasound report under "OB Procedures" tab  Please don't hesitate to contact our office with any concerns or questions    Johnny Astudillo MD

## 2019-08-30 ENCOUNTER — ROUTINE PRENATAL (OUTPATIENT)
Dept: OBGYN CLINIC | Facility: CLINIC | Age: 24
End: 2019-08-30

## 2019-08-30 VITALS — WEIGHT: 228.2 LBS | BODY MASS INDEX: 37.97 KG/M2 | DIASTOLIC BLOOD PRESSURE: 82 MMHG | SYSTOLIC BLOOD PRESSURE: 120 MMHG

## 2019-08-30 DIAGNOSIS — Z72.0 TOBACCO USE: ICD-10-CM

## 2019-08-30 DIAGNOSIS — O99.212 OBESITY COMPLICATING PREGNANCY IN SECOND TRIMESTER: ICD-10-CM

## 2019-08-30 DIAGNOSIS — Z34.80 SUPERVISION OF OTHER NORMAL PREGNANCY, ANTEPARTUM: Primary | ICD-10-CM

## 2019-08-30 PROBLEM — Z01.419 ENCOUNTER FOR GYNECOLOGICAL EXAMINATION WITHOUT ABNORMAL FINDING: Status: RESOLVED | Noted: 2019-07-22 | Resolved: 2019-08-30

## 2019-08-30 LAB
SL AMB  POCT GLUCOSE, UA: NEGATIVE
SL AMB POCT URINE PROTEIN: POSITIVE

## 2019-08-30 PROCEDURE — PNV: Performed by: NURSE PRACTITIONER

## 2019-08-30 NOTE — ASSESSMENT & PLAN NOTE
Return OB  Denies LOF, vag blding, ctxs, cramping and report good FM  Taking PNV daily as prescribed  Denies any problems today  Will need TDap next visit  28 wk labs ordered  Perineal massage due @ 35 wks  Reviewed PTL precautions, [address] also reviewed

## 2019-08-30 NOTE — PROGRESS NOTES
Tobacco use  1-2 cig a day, smoking cessation encouraged  Supervision of other normal pregnancy, antepartum  Return OB  Denies LOF, vag blding, ctxs, cramping and report good FM  Taking PNV daily as prescribed  Denies any problems today  Will need TDap next visit  28 wk labs ordered  Perineal massage due @ 35 wks  Reviewed PTL precautions, [address] also reviewed

## 2019-08-30 NOTE — PROGRESS NOTES
Patient is here for routine prenatal visit with no concerns  24 w 6 d    Patient denies any loss of fluid, blood or cramping  GFM  POCT urine is positive for protein negative for glucose

## 2019-08-30 NOTE — PATIENT INSTRUCTIONS
Pregnancy at 23 to [address]:   What changes are happening in my body? You are now close to or at the beginning of the third trimester  The third trimester starts at 24 weeks and ends with delivery  As your baby gets larger, you may develop certain symptoms  These may include pain in your back or down the sides of your abdomen  You may also have stretch marks on your abdomen, breasts, thighs, or buttocks  You may also have constipation  How do I care for myself at this stage of my pregnancy? · Eat a variety of healthy foods  Healthy foods include fruits, vegetables, whole-grain breads, low-fat dairy foods, beans, lean meats, and fish  Drink liquids as directed  Ask how much liquid to drink each day and which liquids are best for you  Limit caffeine to less than 200 milligrams each day  Limit your intake of fish to 2 servings each week  Choose fish low in mercury such as canned light tuna, shrimp, salmon, cod, or tilapia  Do not  eat fish high in mercury such as swordfish, tilefish, peewee mackerel, and shark  · Manage back pain  Do not stand for long periods of time or lift heavy items  Use good posture while you stand, squat, or bend  Wear low-heeled shoes with good support  Rest may also help to relieve back pain  Ask your healthcare provider about exercises you can do to strengthen your back muscles  · Take prenatal vitamins as directed  Your need for certain vitamins and minerals, such as folic acid, increases during pregnancy  Prenatal vitamins provide some of the extra vitamins and minerals you need  Prenatal vitamins may also help to decrease the risk of certain birth defects  · Talk to your healthcare provider about exercise  Moderate exercise can help you stay fit  Your healthcare provider will help you plan an exercise program that is safe for you during pregnancy  · Do not smoke  If you smoke, it is never too late to quit   Smoking increases your risk of a miscarriage and other health problems during your pregnancy  Smoking can cause your baby to be born too early or weigh less at birth  Ask your healthcare provider for information if you need help quitting  · Do not drink alcohol  Alcohol passes from your body to your baby through the placenta  It can affect your baby's brain development and cause fetal alcohol syndrome (FAS)  FAS is a group of conditions that causes mental, behavior, and growth problems  · Talk to your healthcare provider before you take any medicines  Many medicines may harm your baby if you take them when you are pregnant  Do not take any medicines, vitamins, herbs, or supplements without first talking to your healthcare provider  Never use illegal or street drugs (such as marijuana or cocaine) while you are pregnant  What are some safety tips during pregnancy? · Avoid hot tubs and saunas  Do not use a hot tub or sauna while you are pregnant, especially during your first trimester  Hot tubs and saunas may raise your baby's temperature and increase the risk of birth defects  · Avoid toxoplasmosis  This is an infection caused by eating raw meat or being around infected cat feces  It can cause birth defects, miscarriages, and other problems  Wash your hands after you touch raw meat  Make sure any meat is well-cooked before you eat it  Avoid raw eggs and unpasteurized milk  Use gloves or ask someone else to clean your cat's litter box while you are pregnant  What changes are happening with my baby? By 26 weeks, your baby will weigh about 2 pounds  Your baby will be about 10 inches long from the top of the head to the rump (baby's bottom)  Your baby's movements are much stronger now  Your baby's eyes are almost completely formed and can partially open  Your baby also sleeps and wakes up  What do I need to know about prenatal care? Your healthcare provider will check your blood pressure and weight   You may also need the following:  · A urine test  may also be done to check for sugar and protein  These can be signs of gestational diabetes or infection  Protein in your urine may also be a sign of preeclampsia  Preeclampsia is a condition that can develop during week 20 or later of your pregnancy  It causes high blood pressure, and it can cause problems with your kidneys and other organs  · Fundal height  is a measurement of your uterus to check your baby's growth  This number is usually the same as the number of weeks that you have been pregnant  · Your baby's heart rate  will be checked  When should I seek immediate care? · You develop a severe headache that does not go away  · You have new or increased vision changes, such as blurred or spotted vision  · You have new or increased swelling in your face or hands  · You have vaginal spotting or bleeding  · Your water broke or you feel warm water gushing or trickling from your vagina  When should I contact my healthcare provider? · You have abdominal cramps, pressure, or tightening  · You have a change in vaginal discharge  · You have light bleeding  · You have chills or a fever  · You have vaginal itching, burning, or pain  · You have yellow, green, white, or foul-smelling vaginal discharge  · You have pain or burning when you urinate, less urine than usual, or pink or bloody urine  · You have questions or concerns about your condition or care  CARE AGREEMENT:   You have the right to help plan your care  Learn about your health condition and how it may be treated  Discuss treatment options with your caregivers to decide what care you want to receive  You always have the right to refuse treatment  The above information is an  only  It is not intended as medical advice for individual conditions or treatments   Talk to your doctor, nurse or pharmacist before following any medical regimen to see if it is safe and effective for you   © 2017 [facility] Information is for End User's use only and may not be sold, redistributed or otherwise used for commercial purposes  All illustrations and images included in CareNotes® are the copyrighted property of A D A M , Inc  or [employer]

## 2019-09-03 ENCOUNTER — HOSPITAL ENCOUNTER (OUTPATIENT)
Dept: INFUSION CENTER | Facility: HOSPITAL | Age: 24
Discharge: HOME | End: 2019-09-03

## 2019-09-04 ENCOUNTER — HOSPITAL ENCOUNTER (OUTPATIENT)
Dept: INFUSION CENTER | Facility: HOSPITAL | Age: 24
Discharge: HOME | End: 2019-09-04
Payer: COMMERCIAL

## 2019-09-04 VITALS
TEMPERATURE: 97.5 F | DIASTOLIC BLOOD PRESSURE: 56 MMHG | SYSTOLIC BLOOD PRESSURE: 117 MMHG | HEART RATE: 74 BPM | RESPIRATION RATE: 16 BRPM

## 2019-09-04 DIAGNOSIS — Z76.2: ICD-10-CM

## 2019-09-04 DIAGNOSIS — O99.119 ALLOIMMUNE THROMBOCYTOPENIA DURING PREGNANCY, ANTEPARTUM: Primary | ICD-10-CM

## 2019-09-04 DIAGNOSIS — D69.59 ALLOIMMUNE THROMBOCYTOPENIA DURING PREGNANCY, ANTEPARTUM: Primary | ICD-10-CM

## 2019-09-04 PROCEDURE — 96366 THER/PROPH/DIAG IV INF ADDON: CPT

## 2019-09-04 PROCEDURE — 96375 TX/PRO/DX INJ NEW DRUG ADDON: CPT

## 2019-09-04 PROCEDURE — 96365 THER/PROPH/DIAG IV INF INIT: CPT

## 2019-09-04 RX ORDER — SODIUM CHLORIDE 9 MG/ML
20 INJECTION, SOLUTION INTRAVENOUS ONCE
Status: CANCELLED | OUTPATIENT
Start: 2019-09-09

## 2019-09-04 RX ORDER — DIPHENHYDRAMINE HCL 25 MG
50 TABLET ORAL ONCE
Status: CANCELLED | OUTPATIENT
Start: 2019-09-09

## 2019-09-04 RX ORDER — ACETAMINOPHEN 325 MG/1
650 TABLET ORAL ONCE
Status: CANCELLED | OUTPATIENT
Start: 2019-09-09

## 2019-09-04 RX ORDER — DIPHENHYDRAMINE HCL 25 MG
25 TABLET ORAL ONCE
Status: COMPLETED | OUTPATIENT
Start: 2019-09-04 | End: 2019-09-04

## 2019-09-04 RX ORDER — ACETAMINOPHEN 325 MG/1
650 TABLET ORAL ONCE
Status: COMPLETED | OUTPATIENT
Start: 2019-09-04 | End: 2019-09-04

## 2019-09-04 RX ORDER — DIPHENHYDRAMINE HCL 25 MG
25 TABLET ORAL ONCE
Status: CANCELLED
Start: 2019-09-09

## 2019-09-04 RX ORDER — SODIUM CHLORIDE 9 MG/ML
20 INJECTION, SOLUTION INTRAVENOUS ONCE
Status: COMPLETED | OUTPATIENT
Start: 2019-09-04 | End: 2019-09-04

## 2019-09-04 RX ORDER — DIPHENHYDRAMINE HCL 25 MG
50 TABLET ORAL ONCE
Status: COMPLETED | OUTPATIENT
Start: 2019-09-04 | End: 2019-09-04

## 2019-09-04 RX ADMIN — SODIUM CHLORIDE 20 ML/HR: 0.9 INJECTION, SOLUTION INTRAVENOUS at 08:08

## 2019-09-04 RX ADMIN — DIPHENHYDRAMINE HCL 25 MG: 25 TABLET ORAL at 13:01

## 2019-09-04 RX ADMIN — ACETAMINOPHEN 650 MG: 325 TABLET ORAL at 08:38

## 2019-09-04 RX ADMIN — Medication 200 G: at 09:07

## 2019-09-04 RX ADMIN — HYDROCORTISONE SODIUM SUCCINATE 100 MG: 100 INJECTION, POWDER, FOR SOLUTION INTRAMUSCULAR; INTRAVENOUS at 08:40

## 2019-09-04 RX ADMIN — DIPHENHYDRAMINE HCL 50 MG: 25 TABLET ORAL at 08:37

## 2019-09-04 NOTE — PLAN OF CARE
Problem: Potential for Falls  Goal: Patient will remain free of falls  Description  INTERVENTIONS:  - Assess patient frequently for physical needs  -  Identify cognitive and physical deficits and behaviors that affect risk of falls    -  [place] fall precautions as indicated by assessment   - Educate patient/family on patient safety including physical limitations  - Instruct patient to call for assistance with activity based on assessment  - Modify environment to reduce risk of injury  - Consider OT/PT consult to assist with strengthening/mobility  Outcome: Progressing

## 2019-09-04 NOTE — PROGRESS NOTES
Pt here for weekly IGG  Tolerated well without complications  Confirmed next appts and AVS provided

## 2019-09-04 NOTE — PLAN OF CARE
Problem: Potential for Falls  Goal: Patient will remain free of falls  Description  INTERVENTIONS:  - Assess patient frequently for physical needs  -  Identify cognitive and physical deficits and behaviors that affect risk of falls  -  [place] fall precautions as indicated by assessment   - Educate patient/family on patient safety including physical limitations  - Instruct patient to call for assistance with activity based on assessment  - Modify environment to reduce risk of injury  - Consider OT/PT consult to assist with strengthening/mobility  9/4/2019 0813 by Lorena Doll RN  Outcome: Progressing  9/4/2019 0800 by Lorena oDll RN  Outcome: Progressing     Problem: Potential for Falls  Goal: Patient will remain free of falls  Description  INTERVENTIONS:  - Assess patient frequently for physical needs  -  Identify cognitive and physical deficits and behaviors that affect risk of falls    -  [place] fall precautions as indicated by assessment   - Educate patient/family on patient safety including physical limitations  - Instruct patient to call for assistance with activity based on assessment  - Modify environment to reduce risk of injury  - Consider OT/PT consult to assist with strengthening/mobility  Outcome: Progressing

## 2019-09-05 ENCOUNTER — TELEPHONE (OUTPATIENT)
Dept: OBGYN CLINIC | Facility: CLINIC | Age: 24
End: 2019-09-05

## 2019-09-07 ENCOUNTER — TELEPHONE (OUTPATIENT)
Dept: PERINATAL CARE | Facility: CLINIC | Age: 24
End: 2019-09-07

## 2019-09-07 NOTE — TELEPHONE ENCOUNTER
I spoke with Amandeep Chapa by phone this morning  She received her most recent IVIG on September 4  She is tolerating therapy well, with headache her only complaint  She will continue to receive weekly IVIG as part of her treatment regimen, with addition of prednisone at 32 weeks gestation  Her next infusion will be on Monday September 9  Serial fetal ultrasound studies, with particular attention given to assessment of intracranial anatomy, will be performed by [facility]

## 2019-09-09 ENCOUNTER — HOSPITAL ENCOUNTER (OUTPATIENT)
Dept: INFUSION CENTER | Facility: HOSPITAL | Age: 24
Discharge: HOME | End: 2019-09-09
Payer: COMMERCIAL

## 2019-09-09 VITALS
RESPIRATION RATE: 18 BRPM | WEIGHT: 228.84 LBS | DIASTOLIC BLOOD PRESSURE: 63 MMHG | TEMPERATURE: 97.2 F | SYSTOLIC BLOOD PRESSURE: 136 MMHG | BODY MASS INDEX: 38.08 KG/M2 | HEART RATE: 94 BPM

## 2019-09-09 DIAGNOSIS — D69.59 ALLOIMMUNE THROMBOCYTOPENIA DURING PREGNANCY, ANTEPARTUM: ICD-10-CM

## 2019-09-09 DIAGNOSIS — Z76.2: Primary | ICD-10-CM

## 2019-09-09 DIAGNOSIS — O99.119 ALLOIMMUNE THROMBOCYTOPENIA DURING PREGNANCY, ANTEPARTUM: ICD-10-CM

## 2019-09-09 PROCEDURE — 96365 THER/PROPH/DIAG IV INF INIT: CPT

## 2019-09-09 PROCEDURE — 96366 THER/PROPH/DIAG IV INF ADDON: CPT

## 2019-09-09 PROCEDURE — 96375 TX/PRO/DX INJ NEW DRUG ADDON: CPT

## 2019-09-09 RX ORDER — DIPHENHYDRAMINE HCL 25 MG
25 TABLET ORAL ONCE
Status: CANCELLED
Start: 2019-09-16

## 2019-09-09 RX ORDER — ACETAMINOPHEN 325 MG/1
650 TABLET ORAL ONCE
Status: COMPLETED | OUTPATIENT
Start: 2019-09-09 | End: 2019-09-09

## 2019-09-09 RX ORDER — SODIUM CHLORIDE 9 MG/ML
20 INJECTION, SOLUTION INTRAVENOUS ONCE
Status: COMPLETED | OUTPATIENT
Start: 2019-09-09 | End: 2019-09-09

## 2019-09-09 RX ORDER — DIPHENHYDRAMINE HCL 25 MG
50 TABLET ORAL ONCE
Status: CANCELLED | OUTPATIENT
Start: 2019-09-16

## 2019-09-09 RX ORDER — DIPHENHYDRAMINE HCL 25 MG
50 TABLET ORAL ONCE
Status: COMPLETED | OUTPATIENT
Start: 2019-09-09 | End: 2019-09-09

## 2019-09-09 RX ORDER — DIPHENHYDRAMINE HCL 25 MG
25 TABLET ORAL ONCE
Status: COMPLETED | OUTPATIENT
Start: 2019-09-09 | End: 2019-09-09

## 2019-09-09 RX ORDER — SODIUM CHLORIDE 9 MG/ML
20 INJECTION, SOLUTION INTRAVENOUS ONCE
Status: CANCELLED | OUTPATIENT
Start: 2019-09-16

## 2019-09-09 RX ORDER — ACETAMINOPHEN 325 MG/1
650 TABLET ORAL ONCE
Status: CANCELLED | OUTPATIENT
Start: 2019-09-16

## 2019-09-09 RX ADMIN — Medication 207.5 G: at 09:37

## 2019-09-09 RX ADMIN — HYDROCORTISONE SODIUM SUCCINATE 100 MG: 100 INJECTION, POWDER, FOR SOLUTION INTRAMUSCULAR; INTRAVENOUS at 08:37

## 2019-09-09 RX ADMIN — ACETAMINOPHEN 650 MG: 325 TABLET ORAL at 08:37

## 2019-09-09 RX ADMIN — SODIUM CHLORIDE 20 ML/HR: 0.9 INJECTION, SOLUTION INTRAVENOUS at 08:37

## 2019-09-09 RX ADMIN — DIPHENHYDRAMINE HCL 25 MG: 25 TABLET, COATED ORAL at 12:42

## 2019-09-09 RX ADMIN — DIPHENHYDRAMINE HCL 50 MG: 25 TABLET, COATED ORAL at 08:37

## 2019-09-09 NOTE — PLAN OF CARE
Problem: Potential for Falls  Goal: Patient will remain free of falls  Description  INTERVENTIONS:  - Assess patient frequently for physical needs  -  Identify cognitive and physical deficits and behaviors that affect risk of falls    -  [place] fall precautions as indicated by assessment   - Educate patient/family on patient safety including physical limitations  - Instruct patient to call for assistance with activity based on assessment  - Modify environment to reduce risk of injury  - Consider OT/PT consult to assist with strengthening/mobility  Outcome: Progressing

## 2019-09-10 ENCOUNTER — DOCUMENTATION (OUTPATIENT)
Dept: PERINATAL CARE | Facility: CLINIC | Age: 24
End: 2019-09-10

## 2019-09-10 NOTE — PROGRESS NOTES
Per Dr Genaro Martinez the IV IG (Gamunex - C solution) was approved  I spoke to Edvin Blood at [facility] who did inform me that it was approved from 8/19/19 to 2/18/20

## 2019-09-16 ENCOUNTER — HOSPITAL ENCOUNTER (OUTPATIENT)
Dept: INFUSION CENTER | Facility: HOSPITAL | Age: 24
Discharge: HOME | End: 2019-09-16
Payer: COMMERCIAL

## 2019-09-16 VITALS
RESPIRATION RATE: 18 BRPM | BODY MASS INDEX: 38.3 KG/M2 | DIASTOLIC BLOOD PRESSURE: 58 MMHG | WEIGHT: 230.16 LBS | TEMPERATURE: 97.9 F | SYSTOLIC BLOOD PRESSURE: 125 MMHG | HEART RATE: 85 BPM

## 2019-09-16 DIAGNOSIS — D69.59 ALLOIMMUNE THROMBOCYTOPENIA DURING PREGNANCY, ANTEPARTUM: ICD-10-CM

## 2019-09-16 DIAGNOSIS — O99.119 ALLOIMMUNE THROMBOCYTOPENIA DURING PREGNANCY, ANTEPARTUM: ICD-10-CM

## 2019-09-16 DIAGNOSIS — Z76.2: Primary | ICD-10-CM

## 2019-09-16 PROCEDURE — 96375 TX/PRO/DX INJ NEW DRUG ADDON: CPT

## 2019-09-16 PROCEDURE — 96365 THER/PROPH/DIAG IV INF INIT: CPT

## 2019-09-16 PROCEDURE — 96366 THER/PROPH/DIAG IV INF ADDON: CPT

## 2019-09-16 RX ORDER — DIPHENHYDRAMINE HCL 25 MG
50 TABLET ORAL ONCE
Status: COMPLETED | OUTPATIENT
Start: 2019-09-16 | End: 2019-09-16

## 2019-09-16 RX ORDER — DIPHENHYDRAMINE HCL 25 MG
25 TABLET ORAL ONCE
Status: CANCELLED
Start: 2019-09-16

## 2019-09-16 RX ORDER — ACETAMINOPHEN 325 MG/1
650 TABLET ORAL ONCE
Status: CANCELLED | OUTPATIENT
Start: 2019-09-23

## 2019-09-16 RX ORDER — SODIUM CHLORIDE 9 MG/ML
20 INJECTION, SOLUTION INTRAVENOUS ONCE
Status: CANCELLED | OUTPATIENT
Start: 2019-09-23

## 2019-09-16 RX ORDER — ACETAMINOPHEN 325 MG/1
650 TABLET ORAL ONCE
Status: COMPLETED | OUTPATIENT
Start: 2019-09-16 | End: 2019-09-16

## 2019-09-16 RX ORDER — DIPHENHYDRAMINE HCL 25 MG
25 TABLET ORAL ONCE
Status: COMPLETED | OUTPATIENT
Start: 2019-09-16 | End: 2019-09-16

## 2019-09-16 RX ORDER — DIPHENHYDRAMINE HCL 25 MG
50 TABLET ORAL ONCE
Status: CANCELLED | OUTPATIENT
Start: 2019-09-23

## 2019-09-16 RX ORDER — SODIUM CHLORIDE 9 MG/ML
20 INJECTION, SOLUTION INTRAVENOUS ONCE
Status: COMPLETED | OUTPATIENT
Start: 2019-09-16 | End: 2019-09-16

## 2019-09-16 RX ADMIN — Medication 207.5 G: at 09:36

## 2019-09-16 RX ADMIN — DIPHENHYDRAMINE HCL 25 MG: 25 TABLET, COATED ORAL at 12:46

## 2019-09-16 RX ADMIN — HYDROCORTISONE SODIUM SUCCINATE 100 MG: 100 INJECTION, POWDER, FOR SOLUTION INTRAMUSCULAR; INTRAVENOUS at 08:48

## 2019-09-16 RX ADMIN — ACETAMINOPHEN 650 MG: 325 TABLET ORAL at 08:47

## 2019-09-16 RX ADMIN — SODIUM CHLORIDE 20 ML/HR: 0.9 INJECTION, SOLUTION INTRAVENOUS at 08:46

## 2019-09-16 RX ADMIN — DIPHENHYDRAMINE HCL 50 MG: 25 TABLET, COATED ORAL at 08:47

## 2019-09-16 NOTE — PLAN OF CARE
Problem: Potential for Falls  Goal: Patient will remain free of falls  Description  INTERVENTIONS:  - Assess patient frequently for physical needs  -  Identify cognitive and physical deficits and behaviors that affect risk of falls    -  [place] fall precautions as indicated by assessment   - Educate patient/family on patient safety including physical limitations  - Instruct patient to call for assistance with activity based on assessment  - Modify environment to reduce risk of injury  - Consider OT/PT consult to assist with strengthening/mobility  Outcome: Progressing

## 2019-09-21 ENCOUNTER — HOSPITAL ENCOUNTER (OUTPATIENT)
Facility: HOSPITAL | Age: 24
Discharge: HOME | End: 2019-09-21
Attending: OBSTETRICS & GYNECOLOGY | Admitting: OBSTETRICS & GYNECOLOGY
Payer: COMMERCIAL

## 2019-09-21 VITALS
HEART RATE: 81 BPM | DIASTOLIC BLOOD PRESSURE: 63 MMHG | SYSTOLIC BLOOD PRESSURE: 130 MMHG | RESPIRATION RATE: 19 BRPM | TEMPERATURE: 98.1 F

## 2019-09-21 PROCEDURE — NC001 PR NO CHARGE: Performed by: OBSTETRICS & GYNECOLOGY

## 2019-09-21 PROCEDURE — 76815 OB US LIMITED FETUS(S): CPT | Performed by: OBSTETRICS & GYNECOLOGY

## 2019-09-21 PROCEDURE — 99213 OFFICE O/P EST LOW 20 MIN: CPT

## 2019-09-21 NOTE — PROGRESS NOTES
L&D Triage Note - OB/GYN  Thiago Mcpherson 24 y o  female MRN: [id number]  Unit/Bed#:  Triage 2 Encounter: [id number]      SUBJECTIVE:  Thiago Mcpherson 24 y o   at 28w0d complaining of 2 gushes of clear fluid-small volume and mucoid overnight, reports increased moisture since then  Denies any continuous leakage of fluid/running down legs/saturating pads  Patient had intercourse approximately 24 hours ago  Denies any dysuria, fever, chills, changes in bowel/urinary function, frequency, urgency, flank pain, vulvovaginal itching/bleeding/swelling    Her current obstetrical history is significant for NAIT, prior child with thrombocytopenia, obesity  Contractions: None  Vaginal Bleeding: None  Fetal movement: present  OBJECTIVE:  Vitals:    19 0843   BP: 130/63   Pulse: 81   Resp: 19   Temp: 98 1 °F (36 7 °C)     Speculum: Cervical os is visually closed, no pooling/bleeding/discharge  Visually closed  KOH/WTMT: no clue cells, no hyphae, no trichomonads present  neg ferning, neg nitrazine, neg pooling      FHT:  140bpm / Moderate 6 - 25 bpm / reactive, no decels  Shields: no contractions      ASSESSMENT:  Thiago Mcpherson 24 y o   at 28w0d with negative rupture membrane workup- ferning negative, nitrazine negative, pulling negative  Normal LYN    PLAN:  1  Continue routine prenatal care  2  Discharge from [facility] triage with  labor/rupture membranes-instructed to call if she starts to develop regular contractions/abdominal tightening, leakage of fluid, vaginal bleeding, or decreased fetal movement    3  Case discussed with Dr Leo Cote MD  OB/GYN PGY-4  2019 10:50 AM

## 2019-09-21 NOTE — PROCEDURES
Ashley Carndallcole, a [id number] at 28w0d with an ALBA of 12/14/2019, by Last Menstrual Period, was seen at [facility] for the following procedure(s): $Procedure Type: LYN]         4 Quadrant LYN  LYN Q1 (cm): 3 9 cm  LYN Q2 (cm): 2 3 cm  LYN Q3 (cm): 5 7 cm  LYN Q4 (cm): 5 9 cm  LYN TOTAL (cm): 17 8 cm  LVP (cm): 5 9 cm        Elliot Aquino MD  OB/GYN PGY-4  9/21/2019 11:00 AM

## 2019-09-23 ENCOUNTER — HOSPITAL ENCOUNTER (OUTPATIENT)
Dept: INFUSION CENTER | Facility: HOSPITAL | Age: 24
Discharge: HOME | End: 2019-09-23
Payer: COMMERCIAL

## 2019-09-23 VITALS
TEMPERATURE: 98.2 F | SYSTOLIC BLOOD PRESSURE: 132 MMHG | HEART RATE: 76 BPM | DIASTOLIC BLOOD PRESSURE: 64 MMHG | RESPIRATION RATE: 18 BRPM

## 2019-09-23 DIAGNOSIS — D69.59 ALLOIMMUNE THROMBOCYTOPENIA DURING PREGNANCY, ANTEPARTUM: ICD-10-CM

## 2019-09-23 DIAGNOSIS — Z76.2: Primary | ICD-10-CM

## 2019-09-23 DIAGNOSIS — O99.119 ALLOIMMUNE THROMBOCYTOPENIA DURING PREGNANCY, ANTEPARTUM: ICD-10-CM

## 2019-09-23 PROCEDURE — 96375 TX/PRO/DX INJ NEW DRUG ADDON: CPT

## 2019-09-23 PROCEDURE — 96366 THER/PROPH/DIAG IV INF ADDON: CPT

## 2019-09-23 PROCEDURE — 96365 THER/PROPH/DIAG IV INF INIT: CPT

## 2019-09-23 RX ORDER — DIPHENHYDRAMINE HCL 25 MG
25 TABLET ORAL ONCE
Status: COMPLETED | OUTPATIENT
Start: 2019-09-23 | End: 2019-09-23

## 2019-09-23 RX ORDER — DIPHENHYDRAMINE HCL 25 MG
25 TABLET ORAL ONCE
Status: CANCELLED
Start: 2019-09-30

## 2019-09-23 RX ORDER — DIPHENHYDRAMINE HCL 25 MG
50 TABLET ORAL ONCE
Status: CANCELLED | OUTPATIENT
Start: 2019-09-30

## 2019-09-23 RX ORDER — ACETAMINOPHEN 325 MG/1
650 TABLET ORAL ONCE
Status: CANCELLED | OUTPATIENT
Start: 2019-09-30

## 2019-09-23 RX ORDER — SODIUM CHLORIDE 9 MG/ML
20 INJECTION, SOLUTION INTRAVENOUS ONCE
Status: COMPLETED | OUTPATIENT
Start: 2019-09-23 | End: 2019-09-23

## 2019-09-23 RX ORDER — SODIUM CHLORIDE 9 MG/ML
20 INJECTION, SOLUTION INTRAVENOUS ONCE
Status: CANCELLED | OUTPATIENT
Start: 2019-09-30

## 2019-09-23 RX ORDER — ACETAMINOPHEN 325 MG/1
650 TABLET ORAL ONCE
Status: COMPLETED | OUTPATIENT
Start: 2019-09-23 | End: 2019-09-23

## 2019-09-23 RX ORDER — DIPHENHYDRAMINE HCL 25 MG
50 TABLET ORAL ONCE
Status: COMPLETED | OUTPATIENT
Start: 2019-09-23 | End: 2019-09-23

## 2019-09-23 RX ADMIN — Medication 207.5 G: at 09:22

## 2019-09-23 RX ADMIN — DIPHENHYDRAMINE HCL 50 MG: 25 TABLET, COATED ORAL at 08:43

## 2019-09-23 RX ADMIN — DIPHENHYDRAMINE HCL 25 MG: 25 TABLET, COATED ORAL at 13:06

## 2019-09-23 RX ADMIN — HYDROCORTISONE SODIUM SUCCINATE 100 MG: 100 INJECTION, POWDER, FOR SOLUTION INTRAMUSCULAR; INTRAVENOUS at 08:45

## 2019-09-23 RX ADMIN — ACETAMINOPHEN 650 MG: 325 TABLET ORAL at 08:42

## 2019-09-23 RX ADMIN — SODIUM CHLORIDE 20 ML/HR: 0.9 INJECTION, SOLUTION INTRAVENOUS at 08:41

## 2019-09-23 NOTE — PLAN OF CARE
Problem: Knowledge Deficit  Goal: Patient/family/caregiver demonstrates understanding of disease process, treatment plan, medications, and discharge instructions  Description  Complete learning assessment and assess knowledge base    Interventions:  - Provide teaching at level of understanding  - Provide teaching via preferred learning methods  9/23/2019 0854 by Tiesha Schofield RN  Outcome: Progressing  9/23/2019 0837 by Tiesha Schofield RN  Outcome: Progressing

## 2019-09-23 NOTE — PROGRESS NOTES
Patient was in the Triage on Saturday  Discussed with Dr Ronald Holder and proceed with IVIG as ordered today  Plan removed from hold

## 2019-09-24 ENCOUNTER — ROUTINE PRENATAL (OUTPATIENT)
Dept: OBGYN CLINIC | Facility: MEDICAL CENTER | Age: 24
End: 2019-09-24
Payer: COMMERCIAL

## 2019-09-24 VITALS — DIASTOLIC BLOOD PRESSURE: 78 MMHG | BODY MASS INDEX: 39.07 KG/M2 | WEIGHT: 234.8 LBS | SYSTOLIC BLOOD PRESSURE: 130 MMHG

## 2019-09-24 DIAGNOSIS — Z34.83 ENCOUNTER FOR SUPERVISION OF NORMAL PREGNANCY IN MULTIGRAVIDA IN THIRD TRIMESTER: Primary | ICD-10-CM

## 2019-09-24 DIAGNOSIS — Z23 NEED FOR TDAP VACCINATION: ICD-10-CM

## 2019-09-24 PROBLEM — Z34.80 SUPERVISION OF OTHER NORMAL PREGNANCY, ANTEPARTUM: Status: RESOLVED | Noted: 2019-08-30 | Resolved: 2019-09-24

## 2019-09-24 LAB
SL AMB  POCT GLUCOSE, UA: NEGATIVE
SL AMB POCT URINE PROTEIN: ABNORMAL

## 2019-09-24 PROCEDURE — 90715 TDAP VACCINE 7 YRS/> IM: CPT

## 2019-09-24 PROCEDURE — PNV: Performed by: STUDENT IN AN ORGANIZED HEALTH CARE EDUCATION/TRAINING PROGRAM

## 2019-09-24 PROCEDURE — 90471 IMMUNIZATION ADMIN: CPT

## 2019-09-24 NOTE — PROGRESS NOTES
23 yo  at 29 3/7 wga here for prenatal visit  No complaints  A/P:  1   alloimmune thrombocytopenia - continue weekly IVIG  Per MFM, recommend delivery by CS at 39-38 wga  Follow up with MFM  2  H/o CS x 1  3  Routine OB   -O+   -needs 28 wk labs  Plans to get this week     -s/p tDAP today     -needs flu vaccine   -GBS at 36 wga

## 2019-09-30 ENCOUNTER — TELEPHONE (OUTPATIENT)
Dept: PERINATAL CARE | Facility: CLINIC | Age: 24
End: 2019-09-30

## 2019-09-30 ENCOUNTER — HOSPITAL ENCOUNTER (OUTPATIENT)
Dept: INFUSION CENTER | Facility: HOSPITAL | Age: 24
Discharge: HOME | End: 2019-09-30
Payer: COMMERCIAL

## 2019-09-30 VITALS
RESPIRATION RATE: 18 BRPM | HEART RATE: 72 BPM | TEMPERATURE: 97.2 F | WEIGHT: 234.79 LBS | BODY MASS INDEX: 39.07 KG/M2 | DIASTOLIC BLOOD PRESSURE: 66 MMHG | SYSTOLIC BLOOD PRESSURE: 136 MMHG

## 2019-09-30 DIAGNOSIS — O99.119 ALLOIMMUNE THROMBOCYTOPENIA DURING PREGNANCY, ANTEPARTUM: ICD-10-CM

## 2019-09-30 DIAGNOSIS — D69.59 ALLOIMMUNE THROMBOCYTOPENIA DURING PREGNANCY, ANTEPARTUM: ICD-10-CM

## 2019-09-30 DIAGNOSIS — Z76.2: Primary | ICD-10-CM

## 2019-09-30 PROCEDURE — 96366 THER/PROPH/DIAG IV INF ADDON: CPT

## 2019-09-30 PROCEDURE — 96365 THER/PROPH/DIAG IV INF INIT: CPT

## 2019-09-30 PROCEDURE — 96375 TX/PRO/DX INJ NEW DRUG ADDON: CPT

## 2019-09-30 RX ORDER — SODIUM CHLORIDE 9 MG/ML
20 INJECTION, SOLUTION INTRAVENOUS ONCE
Status: COMPLETED | OUTPATIENT
Start: 2019-09-30 | End: 2019-09-30

## 2019-09-30 RX ORDER — SODIUM CHLORIDE 9 MG/ML
20 INJECTION, SOLUTION INTRAVENOUS ONCE
Status: CANCELLED | OUTPATIENT
Start: 2019-10-07

## 2019-09-30 RX ORDER — DIPHENHYDRAMINE HCL 25 MG
50 TABLET ORAL ONCE
Status: COMPLETED | OUTPATIENT
Start: 2019-09-30 | End: 2019-09-30

## 2019-09-30 RX ORDER — ACETAMINOPHEN 325 MG/1
650 TABLET ORAL ONCE
Status: CANCELLED | OUTPATIENT
Start: 2019-10-07

## 2019-09-30 RX ORDER — ACETAMINOPHEN 325 MG/1
650 TABLET ORAL ONCE
Status: COMPLETED | OUTPATIENT
Start: 2019-09-30 | End: 2019-09-30

## 2019-09-30 RX ORDER — DIPHENHYDRAMINE HCL 25 MG
50 TABLET ORAL ONCE
Status: CANCELLED | OUTPATIENT
Start: 2019-10-07

## 2019-09-30 RX ORDER — DIPHENHYDRAMINE HCL 25 MG
25 TABLET ORAL ONCE
Status: CANCELLED
Start: 2019-10-07

## 2019-09-30 RX ORDER — DIPHENHYDRAMINE HCL 25 MG
25 TABLET ORAL ONCE
Status: COMPLETED | OUTPATIENT
Start: 2019-09-30 | End: 2019-09-30

## 2019-09-30 RX ADMIN — Medication 215 G: at 09:40

## 2019-09-30 RX ADMIN — HYDROCORTISONE SODIUM SUCCINATE 100 MG: 100 INJECTION, POWDER, FOR SOLUTION INTRAMUSCULAR; INTRAVENOUS at 09:01

## 2019-09-30 RX ADMIN — DIPHENHYDRAMINE HCL 50 MG: 25 TABLET, COATED ORAL at 09:01

## 2019-09-30 RX ADMIN — SODIUM CHLORIDE 20 ML/HR: 0.9 INJECTION, SOLUTION INTRAVENOUS at 09:01

## 2019-09-30 RX ADMIN — ACETAMINOPHEN 650 MG: 325 TABLET ORAL at 09:01

## 2019-09-30 RX ADMIN — DIPHENHYDRAMINE HCL 25 MG: 25 TABLET, COATED ORAL at 13:02

## 2019-09-30 NOTE — PLAN OF CARE
Problem: Potential for Falls  Goal: Patient will remain free of falls  Description  INTERVENTIONS:  - Assess patient frequently for physical needs  -  Identify cognitive and physical deficits and behaviors that affect risk of falls    -  [place] fall precautions as indicated by assessment   - Educate patient/family on patient safety including physical limitations  - Instruct patient to call for assistance with activity based on assessment  - Modify environment to reduce risk of injury  - Consider OT/PT consult to assist with strengthening/mobility  Outcome: Progressing

## 2019-09-30 NOTE — TELEPHONE ENCOUNTER
I called Candie Valdes to move her appointment up sooner per a request from Dr Brien Lynn  I spoke to the patient and she will come in Thursday 10/3 at 230 PM to our [facility] office with Dr Fede Rodríguez  She is aware the appointment from 10/11/19 has been cancelled

## 2019-10-01 ENCOUNTER — TELEPHONE (OUTPATIENT)
Dept: PERINATAL CARE | Facility: OTHER | Age: 24
End: 2019-10-01

## 2019-10-01 ENCOUNTER — APPOINTMENT (OUTPATIENT)
Dept: LAB | Facility: MEDICAL CENTER | Age: 24
End: 2019-10-01
Payer: COMMERCIAL

## 2019-10-01 DIAGNOSIS — Z34.80 SUPERVISION OF OTHER NORMAL PREGNANCY, ANTEPARTUM: ICD-10-CM

## 2019-10-01 PROCEDURE — 82950 GLUCOSE TEST: CPT

## 2019-10-01 PROCEDURE — 86592 SYPHILIS TEST NON-TREP QUAL: CPT

## 2019-10-01 PROCEDURE — 36415 COLL VENOUS BLD VENIPUNCTURE: CPT

## 2019-10-01 PROCEDURE — 85025 COMPLETE CBC W/AUTO DIFF WBC: CPT

## 2019-10-01 NOTE — TELEPHONE ENCOUNTER
I spoke with Shanti Lorenz by phone this morning  We discussed that we will add prednisone to her IVIG regimen at 32 weeks gestation  She will be evaluated by me in the MFM office at [place] later in the week to assess interval growth and evaluate intracranial anatomy  She is doing well with IVIG  We will discuss timing of delivery in more detail at her visit later this week

## 2019-10-02 ENCOUNTER — TELEPHONE (OUTPATIENT)
Dept: OBGYN CLINIC | Facility: CLINIC | Age: 24
End: 2019-10-02

## 2019-10-02 LAB
BASOPHILS # BLD AUTO: 0.02 THOUSANDS/ΜL (ref 0–0.1)
BASOPHILS NFR BLD AUTO: 1 % (ref 0–1)
EOSINOPHIL # BLD AUTO: 0.17 THOUSAND/ΜL (ref 0–0.61)
EOSINOPHIL NFR BLD AUTO: 5 % (ref 0–6)
ERYTHROCYTE [DISTWIDTH] IN BLOOD BY AUTOMATED COUNT: 13.1 % (ref 11.6–15.1)
GLUCOSE 1H P 50 G GLC PO SERPL-MCNC: 95 MG/DL
HCT VFR BLD AUTO: 31.9 % (ref 34.8–46.1)
HGB BLD-MCNC: 10.1 G/DL (ref 11.5–15.4)
IMM GRANULOCYTES # BLD AUTO: 0.02 THOUSAND/UL (ref 0–0.2)
IMM GRANULOCYTES NFR BLD AUTO: 1 % (ref 0–2)
LYMPHOCYTES # BLD AUTO: 1.38 THOUSANDS/ΜL (ref 0.6–4.47)
LYMPHOCYTES NFR BLD AUTO: 39 % (ref 14–44)
MCH RBC QN AUTO: 28.1 PG (ref 26.8–34.3)
MCHC RBC AUTO-ENTMCNC: 31.7 G/DL (ref 31.4–37.4)
MCV RBC AUTO: 89 FL (ref 82–98)
MONOCYTES # BLD AUTO: 0.36 THOUSAND/ΜL (ref 0.17–1.22)
MONOCYTES NFR BLD AUTO: 10 % (ref 4–12)
NEUTROPHILS # BLD AUTO: 1.59 THOUSANDS/ΜL (ref 1.85–7.62)
NEUTS SEG NFR BLD AUTO: 44 % (ref 43–75)
NRBC BLD AUTO-RTO: 0 /100 WBCS
PLATELET # BLD AUTO: 248 THOUSANDS/UL (ref 149–390)
PMV BLD AUTO: 10.4 FL (ref 8.9–12.7)
RBC # BLD AUTO: 3.59 MILLION/UL (ref 3.81–5.12)
RPR SER QL: NORMAL
WBC # BLD AUTO: 3.54 THOUSAND/UL (ref 4.31–10.16)

## 2019-10-02 NOTE — TELEPHONE ENCOUNTER
----- Message from [address]if[address] sent at 10/2/2019  4:18 PM EDT -----  Please let pt know that her blood count dropped a little and will need to start iron supplementation  Take iron in am with breakfast, vit c like orange juice and PNV with lunch or dinner  Glucose and RPR normal/neg testing    Thanks

## 2019-10-02 NOTE — TELEPHONE ENCOUNTER
Spoke with Pt today via phone call  Pt informed that her blood count has dropped a little and she will need to start OTC iron supplementation as recommended by Cherry Sotelo  Pt instructed to take iron supplement in AM with breakfast and vitamin C like orange juice and prenatal vitamin with lunch or dinner per Cherry Sotelo  Pt further informed that her recent glucose and RPR test results were negative (normal) per VICENTA Pisano's review of results  Reiterated to Pt that if she has any questions/concerns to contact office

## 2019-10-03 ENCOUNTER — ULTRASOUND (OUTPATIENT)
Dept: PERINATAL CARE | Facility: OTHER | Age: 24
End: 2019-10-03
Payer: COMMERCIAL

## 2019-10-03 VITALS
HEIGHT: 65 IN | BODY MASS INDEX: 39.25 KG/M2 | DIASTOLIC BLOOD PRESSURE: 73 MMHG | SYSTOLIC BLOOD PRESSURE: 112 MMHG | HEART RATE: 69 BPM | WEIGHT: 235.6 LBS

## 2019-10-03 DIAGNOSIS — O99.213 OBESITY AFFECTING PREGNANCY IN THIRD TRIMESTER: ICD-10-CM

## 2019-10-03 DIAGNOSIS — D69.59 ALLOIMMUNE THROMBOCYTOPENIA DURING PREGNANCY, ANTEPARTUM: Primary | ICD-10-CM

## 2019-10-03 DIAGNOSIS — O99.119 ALLOIMMUNE THROMBOCYTOPENIA DURING PREGNANCY, ANTEPARTUM: Primary | ICD-10-CM

## 2019-10-03 DIAGNOSIS — Z3A.29 29 WEEKS GESTATION OF PREGNANCY: ICD-10-CM

## 2019-10-03 PROCEDURE — 76816 OB US FOLLOW-UP PER FETUS: CPT | Performed by: OBSTETRICS & GYNECOLOGY

## 2019-10-03 PROCEDURE — 99212 OFFICE O/P EST SF 10 MIN: CPT | Performed by: OBSTETRICS & GYNECOLOGY

## 2019-10-03 RX ORDER — PREDNISONE 20 MG/1
20 TABLET ORAL DAILY
Qty: 30 TABLET | Refills: 0 | Status: SHIPPED | OUTPATIENT
Start: 2019-10-03 | End: 2019-11-01

## 2019-10-03 NOTE — LETTER
October 3, 2019     Clarence Shah [address]  [address] [address]    Patient: Hola Albarran   YOB: 1995   Date of Visit: 10/3/2019       Dear Dr Roseline Stock: Thank you for referring Tyrell Martínez to me for evaluation  Below are my notes for this consultation  If you have questions, please do not hesitate to call me  I look forward to following your patient along with you  Sincerely,        Jeniffer Astudillo MD        CC: No Recipients  Jeniffer Astudillo MD  10/3/2019  3:12 PM  Sign at close encounter  Please refer to the [facility] ultrasound report in Ob Procedures for additional information regarding the visit to the [facility]  today

## 2019-10-03 NOTE — PATIENT INSTRUCTIONS
Kick Counts in Pregnancy   WHAT YOU NEED TO KNOW:   Kick counts measure how much your baby is moving in your womb  A kick from your baby can be felt as a twist, turn, swish, roll, or jab  It is common to feel your baby kicking at 26 to 28 weeks of pregnancy  You may feel your baby kick as early as 20 weeks of pregnancy  DISCHARGE INSTRUCTIONS:   Return to the emergency department if:   · You feel your baby kick less as the day goes on      · You do not feel any kicks in a day  Contact your healthcare provider if:   · You feel a change in the number of kicks or movements of your baby  · You feel fewer than 10 kicks within 2 hours after counting twice  · You have questions or concerns about your baby's movements  Why measure kick counts:  Your baby's movement may provide information about your baby's health  He may move less, or not at all, if there are problems  He may move less if he does not have enough room to grow in your uterus (womb)  He may also move less if he is not getting enough oxygen or nutrition from the placenta  Tell your healthcare provider as soon as you feel a change in your baby's movements  Problems that are found earlier are easier to treat  When to measure kick counts:   · Measure kick counts at the same time every day  · Measure kick counts when your baby is awake and most active  Your baby may be most active in the evening  · Measure kick counts after a meal or snack  Your baby may be more active after you eat  Wait 2 hours after you drink liquids that contain caffeine  Caffeine can make your baby more active than usual     · You should not smoke while you are pregnant  Smoking increases the risk of health problems for you and for your baby during your pregnancy  If you do smoke, wait 2 hours to measure kick counts  Nicotine can make your baby more active than usual   How to measure kick counts:  Check that your baby is awake before you measure kick counts   You can wake up your baby by lightly pushing on your belly, walking, or drinking something cold  Your healthcare provider may tell you different ways to measure kick counts  He may tell you to do the following:  · Use a chart or clock to keep track of the time you start and finish counting  · Sit in a chair or lie on your left side  · Place your hands on the largest part of your belly  · Count until you reach 10 kicks  Write down how much time it takes to count 10 kicks  · It may take 30 minutes to 2 hours to count 10 kicks  It should not take more than 2 hours to count 10 kicks  · If you do not feel 10 kicks within 2 hours, wait 1 hour and count again  Your baby can sleep for up to 40 minutes at one time  Follow up with your healthcare provider as directed:  Write down your questions so you remember to ask them during your visits  © 2017 [address] [employer] [facility] [employer] Information is for End User's use only and may not be sold, redistributed or otherwise used for commercial purposes  All illustrations and images included in CareNotes® are the copyrighted property of [employer]  or Theron Mon[employer]  The above information is an  only  It is not intended as medical advice for individual conditions or treatments  Talk to your doctor, nurse or pharmacist before following any medical regimen to see if it is safe and effective for you

## 2019-10-03 NOTE — PROGRESS NOTES
Please refer to the [facility] ultrasound report in Ob Procedures for additional information regarding the visit to the [facility]  today

## 2019-10-07 ENCOUNTER — HOSPITAL ENCOUNTER (OUTPATIENT)
Dept: INFUSION CENTER | Facility: HOSPITAL | Age: 24
Discharge: HOME | End: 2019-10-07
Payer: COMMERCIAL

## 2019-10-07 VITALS
TEMPERATURE: 97.4 F | DIASTOLIC BLOOD PRESSURE: 64 MMHG | BODY MASS INDEX: 39.14 KG/M2 | HEART RATE: 85 BPM | SYSTOLIC BLOOD PRESSURE: 133 MMHG | WEIGHT: 235.23 LBS | RESPIRATION RATE: 18 BRPM

## 2019-10-07 DIAGNOSIS — Z76.2: Primary | ICD-10-CM

## 2019-10-07 DIAGNOSIS — D69.59 ALLOIMMUNE THROMBOCYTOPENIA DURING PREGNANCY, ANTEPARTUM: ICD-10-CM

## 2019-10-07 DIAGNOSIS — O99.119 ALLOIMMUNE THROMBOCYTOPENIA DURING PREGNANCY, ANTEPARTUM: ICD-10-CM

## 2019-10-07 PROCEDURE — 96366 THER/PROPH/DIAG IV INF ADDON: CPT

## 2019-10-07 PROCEDURE — 96375 TX/PRO/DX INJ NEW DRUG ADDON: CPT

## 2019-10-07 PROCEDURE — 96365 THER/PROPH/DIAG IV INF INIT: CPT

## 2019-10-07 RX ORDER — ACETAMINOPHEN 325 MG/1
650 TABLET ORAL ONCE
Status: CANCELLED | OUTPATIENT
Start: 2019-10-14

## 2019-10-07 RX ORDER — DIPHENHYDRAMINE HCL 25 MG
25 CAPSULE ORAL ONCE
Status: CANCELLED
Start: 2019-10-14

## 2019-10-07 RX ORDER — DIPHENHYDRAMINE HCL 25 MG
25 TABLET ORAL ONCE
Status: COMPLETED | OUTPATIENT
Start: 2019-10-07 | End: 2019-10-07

## 2019-10-07 RX ORDER — SODIUM CHLORIDE 9 MG/ML
20 INJECTION, SOLUTION INTRAVENOUS ONCE
Status: COMPLETED | OUTPATIENT
Start: 2019-10-07 | End: 2019-10-07

## 2019-10-07 RX ORDER — DIPHENHYDRAMINE HCL 25 MG
50 TABLET ORAL ONCE
Status: CANCELLED | OUTPATIENT
Start: 2019-10-14

## 2019-10-07 RX ORDER — DIPHENHYDRAMINE HCL 25 MG
50 TABLET ORAL ONCE
Status: COMPLETED | OUTPATIENT
Start: 2019-10-07 | End: 2019-10-07

## 2019-10-07 RX ORDER — ACETAMINOPHEN 325 MG/1
650 TABLET ORAL ONCE
Status: COMPLETED | OUTPATIENT
Start: 2019-10-07 | End: 2019-10-07

## 2019-10-07 RX ORDER — SODIUM CHLORIDE 9 MG/ML
20 INJECTION, SOLUTION INTRAVENOUS ONCE
Status: CANCELLED | OUTPATIENT
Start: 2019-10-14

## 2019-10-07 RX ADMIN — HYDROCORTISONE SODIUM SUCCINATE 100 MG: 100 INJECTION, POWDER, FOR SOLUTION INTRAMUSCULAR; INTRAVENOUS at 09:04

## 2019-10-07 RX ADMIN — DIPHENHYDRAMINE HCL 25 MG: 25 TABLET, COATED ORAL at 12:56

## 2019-10-07 RX ADMIN — SODIUM CHLORIDE 20 ML/HR: 0.9 INJECTION, SOLUTION INTRAVENOUS at 08:55

## 2019-10-07 RX ADMIN — ACETAMINOPHEN 650 MG: 325 TABLET ORAL at 09:03

## 2019-10-07 RX ADMIN — DIPHENHYDRAMINE HCL 25 MG: 25 TABLET, COATED ORAL at 09:03

## 2019-10-07 RX ADMIN — Medication 215 G: at 10:11

## 2019-10-07 NOTE — PLAN OF CARE
Problem: Potential for Falls  Goal: Patient will remain free of falls  Description  INTERVENTIONS:  - Assess patient frequently for physical needs  -  Identify cognitive and physical deficits and behaviors that affect risk of falls    -  [place] fall precautions as indicated by assessment   - Educate patient/family on patient safety including physical limitations  - Instruct patient to call for assistance with activity based on assessment  - Modify environment to reduce risk of injury  - Consider OT/PT consult to assist with strengthening/mobility  Outcome: Progressing

## 2019-10-09 ENCOUNTER — TELEPHONE (OUTPATIENT)
Dept: OBGYN CLINIC | Facility: CLINIC | Age: 24
End: 2019-10-09

## 2019-10-09 NOTE — TELEPHONE ENCOUNTER
Infusion center in [facility]  ([phone] called  Pt needs date of infuion changed from 10/7 to 10/14

## 2019-10-10 ENCOUNTER — ROUTINE PRENATAL (OUTPATIENT)
Dept: OBGYN CLINIC | Facility: MEDICAL CENTER | Age: 24
End: 2019-10-10
Payer: COMMERCIAL

## 2019-10-10 ENCOUNTER — TELEPHONE (OUTPATIENT)
Dept: OBGYN CLINIC | Facility: CLINIC | Age: 24
End: 2019-10-10

## 2019-10-10 VITALS — DIASTOLIC BLOOD PRESSURE: 82 MMHG | SYSTOLIC BLOOD PRESSURE: 124 MMHG | BODY MASS INDEX: 39.54 KG/M2 | WEIGHT: 237.6 LBS

## 2019-10-10 DIAGNOSIS — Z34.83 ENCOUNTER FOR SUPERVISION OF NORMAL PREGNANCY IN MULTIGRAVIDA IN THIRD TRIMESTER: Primary | ICD-10-CM

## 2019-10-10 DIAGNOSIS — D69.59 ALLOIMMUNE THROMBOCYTOPENIA DURING PREGNANCY, ANTEPARTUM: ICD-10-CM

## 2019-10-10 DIAGNOSIS — O99.119 ALLOIMMUNE THROMBOCYTOPENIA DURING PREGNANCY, ANTEPARTUM: ICD-10-CM

## 2019-10-10 DIAGNOSIS — Z23 NEED FOR INFLUENZA VACCINATION: ICD-10-CM

## 2019-10-10 LAB
SL AMB  POCT GLUCOSE, UA: NEGATIVE
SL AMB POCT URINE PROTEIN: NEGATIVE

## 2019-10-10 PROCEDURE — PNV: Performed by: STUDENT IN AN ORGANIZED HEALTH CARE EDUCATION/TRAINING PROGRAM

## 2019-10-10 PROCEDURE — 90686 IIV4 VACC NO PRSV 0.5 ML IM: CPT | Performed by: STUDENT IN AN ORGANIZED HEALTH CARE EDUCATION/TRAINING PROGRAM

## 2019-10-10 PROCEDURE — 90471 IMMUNIZATION ADMIN: CPT | Performed by: STUDENT IN AN ORGANIZED HEALTH CARE EDUCATION/TRAINING PROGRAM

## 2019-10-10 NOTE — TELEPHONE ENCOUNTER
----- Message from Esme Caba MD sent at 10/10/2019  3:26 PM EDT -----  Regarding: Repeat   Hi all -     I'm not sure if it's too early but if not - can you help me schedule a repeat CS with me for this pt? She will need to be delivered at 39-38 wga per MFM due to alloimmune thrombocytopenia  38 wks would be   Thank you!

## 2019-10-10 NOTE — TELEPHONE ENCOUNTER
Infusion center called, ref in for [facility] location but pt is going to [facility] on Monday so needs updated ref

## 2019-10-10 NOTE — TELEPHONE ENCOUNTER
L & D called- pt's repeat C/S was scheduled for Friday 11/29/19 at 1000 with dr Lucille Rodríguez  ( unable to schedule on Saturday, 11/30/19)  Called pt to inform, Advised to report to L & D at 0800  Pt verbalized understanding  Routed to Dr Lucille Rodríguez

## 2019-10-10 NOTE — PROGRESS NOTES
Pt doing well; no complaints  Good fm  Has breast pump at home  Check in card given Received flu vaccine, tolerated well

## 2019-10-10 NOTE — PROGRESS NOTES
20 yo  at 27 5/7 wga presents for routine PNV  Complains of sciatic/back pain  No ctx/LOF/VB  Good fetal movement    Problem List Items Addressed This Visit     Hematopoietic and Hemostatic    Alloimmune thrombocytopenia during pregnancy, antepartum  -Continue weekly IVIG infusions until delivery  -Start PO prednisone at 28 wga - pt aware    Encounter for supervision of normal pregnancy in multigravida in third trimester    -  Primary    S/p tDAP  S/p flu vaccine  1 hr glucola WNL  Mild anemia - taking iron  Antepartum testing at 39 wga for BMI > 40  Follow up in [facility]  Plan for RLTCS at 37-38 wga    Need for influenza vaccination        influenza vaccine, 9638-4004, quadrivalent, 0 5 mL, preservative-free, for adult and pediatric patients 6 mos+ (AFLURIA, FLUARIX, FLULAVAL, FLUZONE) (Completed)

## 2019-10-11 NOTE — TELEPHONE ENCOUNTER
Dr Jostin Dixon is covering L&D that day  I am in the office  I'm not sure how this usually works, but if Dr Jostin Dixon is willing to do her CS that is fine with me  Otherwise I am happy to come in that day  Unfortunately I am not on L&D any other day during that 37-38 wga window

## 2019-10-11 NOTE — TELEPHONE ENCOUNTER
Spoke with Mary from infusion center  Mary anticipates that Eileen Chu may need a new auth based on location of infusion Monday 10/14 being [place]  Original authorization was done through [facility], Abiodun Garcia  Called [facility], Mireya Sanders is gone for the day  Called CBC 4x  Was disconnected every time after entering patient d o b  Advised Infusion center we should not cancel this appointment as this is medically necessary  I will route to Mireya Sanders to address on Monday  CC to [facility]

## 2019-10-14 ENCOUNTER — DOCUMENTATION (OUTPATIENT)
Dept: PERINATAL CARE | Facility: CLINIC | Age: 24
End: 2019-10-14

## 2019-10-14 ENCOUNTER — HOSPITAL ENCOUNTER (OUTPATIENT)
Dept: INFUSION CENTER | Facility: CLINIC | Age: 24
Discharge: HOME | End: 2019-10-14
Payer: COMMERCIAL

## 2019-10-14 VITALS
TEMPERATURE: 97.8 F | BODY MASS INDEX: 39.36 KG/M2 | SYSTOLIC BLOOD PRESSURE: 123 MMHG | WEIGHT: 236.55 LBS | HEART RATE: 81 BPM | DIASTOLIC BLOOD PRESSURE: 78 MMHG | RESPIRATION RATE: 18 BRPM

## 2019-10-14 DIAGNOSIS — O99.119 ALLOIMMUNE THROMBOCYTOPENIA DURING PREGNANCY, ANTEPARTUM: Primary | ICD-10-CM

## 2019-10-14 DIAGNOSIS — D69.59 ALLOIMMUNE THROMBOCYTOPENIA DURING PREGNANCY, ANTEPARTUM: Primary | ICD-10-CM

## 2019-10-14 DIAGNOSIS — Z76.2: ICD-10-CM

## 2019-10-14 PROCEDURE — 96366 THER/PROPH/DIAG IV INF ADDON: CPT

## 2019-10-14 PROCEDURE — 96375 TX/PRO/DX INJ NEW DRUG ADDON: CPT

## 2019-10-14 PROCEDURE — 96365 THER/PROPH/DIAG IV INF INIT: CPT

## 2019-10-14 RX ORDER — ACETAMINOPHEN 325 MG/1
650 TABLET ORAL ONCE
Status: CANCELLED | OUTPATIENT
Start: 2019-10-21

## 2019-10-14 RX ORDER — SODIUM CHLORIDE 9 MG/ML
20 INJECTION, SOLUTION INTRAVENOUS ONCE
Status: CANCELLED | OUTPATIENT
Start: 2019-10-21

## 2019-10-14 RX ORDER — DIPHENHYDRAMINE HCL 25 MG
50 TABLET ORAL ONCE
Status: CANCELLED | OUTPATIENT
Start: 2019-10-21

## 2019-10-14 RX ORDER — SODIUM CHLORIDE 9 MG/ML
20 INJECTION, SOLUTION INTRAVENOUS ONCE
Status: COMPLETED | OUTPATIENT
Start: 2019-10-14 | End: 2019-10-14

## 2019-10-14 RX ORDER — DIPHENHYDRAMINE HCL 25 MG
25 TABLET ORAL ONCE
Status: CANCELLED
Start: 2019-10-21

## 2019-10-14 RX ORDER — ACETAMINOPHEN 325 MG/1
650 TABLET ORAL ONCE
Status: COMPLETED | OUTPATIENT
Start: 2019-10-14 | End: 2019-10-14

## 2019-10-14 RX ORDER — DIPHENHYDRAMINE HCL 25 MG
25 CAPSULE ORAL ONCE
Status: COMPLETED | OUTPATIENT
Start: 2019-10-14 | End: 2019-10-14

## 2019-10-14 RX ORDER — DIPHENHYDRAMINE HCL 25 MG
50 TABLET ORAL ONCE
Status: COMPLETED | OUTPATIENT
Start: 2019-10-14 | End: 2019-10-14

## 2019-10-14 RX ADMIN — SODIUM CHLORIDE 20 ML/HR: 0.9 INJECTION, SOLUTION INTRAVENOUS at 08:36

## 2019-10-14 RX ADMIN — DIPHENHYDRAMINE HCL 50 MG: 25 TABLET ORAL at 08:37

## 2019-10-14 RX ADMIN — HYDROCORTISONE SODIUM SUCCINATE 100 MG: 100 INJECTION, POWDER, FOR SOLUTION INTRAMUSCULAR; INTRAVENOUS at 08:37

## 2019-10-14 RX ADMIN — ACETAMINOPHEN 650 MG: 325 TABLET, FILM COATED ORAL at 08:36

## 2019-10-14 RX ADMIN — Medication 215 G: at 09:24

## 2019-10-14 NOTE — PROGRESS NOTES
Patient tolerated treatment without complication  AVS declined, patient left unit in stable condition

## 2019-10-14 NOTE — PLAN OF CARE
Problem: Potential for Falls  Goal: Patient will remain free of falls  Description  INTERVENTIONS:  - Assess patient frequently for physical needs  -  Identify cognitive and physical deficits and behaviors that affect risk of falls    -  [place] fall precautions as indicated by assessment   - Educate patient/family on patient safety including physical limitations  - Instruct patient to call for assistance with activity based on assessment  - Modify environment to reduce risk of injury  - Consider OT/PT consult to assist with strengthening/mobility  Outcome: Progressing

## 2019-10-21 ENCOUNTER — HOSPITAL ENCOUNTER (OUTPATIENT)
Dept: INFUSION CENTER | Facility: HOSPITAL | Age: 24
Discharge: HOME | End: 2019-10-21
Payer: COMMERCIAL

## 2019-10-21 VITALS
BODY MASS INDEX: 39.44 KG/M2 | SYSTOLIC BLOOD PRESSURE: 129 MMHG | TEMPERATURE: 98.3 F | RESPIRATION RATE: 20 BRPM | WEIGHT: 236.99 LBS | HEART RATE: 77 BPM | DIASTOLIC BLOOD PRESSURE: 59 MMHG

## 2019-10-21 DIAGNOSIS — O99.119 ALLOIMMUNE THROMBOCYTOPENIA DURING PREGNANCY, ANTEPARTUM: Primary | ICD-10-CM

## 2019-10-21 DIAGNOSIS — D69.59 ALLOIMMUNE THROMBOCYTOPENIA DURING PREGNANCY, ANTEPARTUM: Primary | ICD-10-CM

## 2019-10-21 DIAGNOSIS — Z76.2: ICD-10-CM

## 2019-10-21 PROCEDURE — 96375 TX/PRO/DX INJ NEW DRUG ADDON: CPT

## 2019-10-21 PROCEDURE — 96365 THER/PROPH/DIAG IV INF INIT: CPT

## 2019-10-21 PROCEDURE — 96366 THER/PROPH/DIAG IV INF ADDON: CPT

## 2019-10-21 RX ORDER — DIPHENHYDRAMINE HCL 25 MG
50 TABLET ORAL ONCE
Status: COMPLETED | OUTPATIENT
Start: 2019-10-21 | End: 2019-10-21

## 2019-10-21 RX ORDER — SODIUM CHLORIDE 9 MG/ML
20 INJECTION, SOLUTION INTRAVENOUS ONCE
Status: CANCELLED | OUTPATIENT
Start: 2019-10-28

## 2019-10-21 RX ORDER — DIPHENHYDRAMINE HCL 25 MG
25 TABLET ORAL ONCE
Status: COMPLETED | OUTPATIENT
Start: 2019-10-21 | End: 2019-10-21

## 2019-10-21 RX ORDER — DIPHENHYDRAMINE HCL 25 MG
25 TABLET ORAL ONCE
Status: CANCELLED
Start: 2019-10-28

## 2019-10-21 RX ORDER — ACETAMINOPHEN 325 MG/1
650 TABLET ORAL ONCE
Status: CANCELLED | OUTPATIENT
Start: 2019-10-28

## 2019-10-21 RX ORDER — DIPHENHYDRAMINE HCL 25 MG
50 TABLET ORAL ONCE
Status: CANCELLED | OUTPATIENT
Start: 2019-10-28

## 2019-10-21 RX ORDER — ACETAMINOPHEN 325 MG/1
650 TABLET ORAL ONCE
Status: COMPLETED | OUTPATIENT
Start: 2019-10-21 | End: 2019-10-21

## 2019-10-21 RX ORDER — SODIUM CHLORIDE 9 MG/ML
20 INJECTION, SOLUTION INTRAVENOUS ONCE
Status: COMPLETED | OUTPATIENT
Start: 2019-10-21 | End: 2019-10-21

## 2019-10-21 RX ADMIN — SODIUM CHLORIDE 20 ML/HR: 0.9 INJECTION, SOLUTION INTRAVENOUS at 07:49

## 2019-10-21 RX ADMIN — ACETAMINOPHEN 650 MG: 325 TABLET ORAL at 07:50

## 2019-10-21 RX ADMIN — DIPHENHYDRAMINE HCL 50 MG: 25 TABLET, COATED ORAL at 07:50

## 2019-10-21 RX ADMIN — HYDROCORTISONE SODIUM SUCCINATE 100 MG: 100 INJECTION, POWDER, FOR SOLUTION INTRAMUSCULAR; INTRAVENOUS at 07:50

## 2019-10-21 RX ADMIN — DIPHENHYDRAMINE HCL 25 MG: 25 TABLET, COATED ORAL at 11:59

## 2019-10-21 RX ADMIN — Medication 215 G: at 08:47

## 2019-10-21 NOTE — PLAN OF CARE
Problem: Potential for Falls  Goal: Patient will remain free of falls  Description  INTERVENTIONS:  - Assess patient frequently for physical needs  -  Identify cognitive and physical deficits and behaviors that affect risk of falls    -  [place] fall precautions as indicated by assessment   - Educate patient/family on patient safety including physical limitations  - Instruct patient to call for assistance with activity based on assessment  - Modify environment to reduce risk of injury  - Consider OT/PT consult to assist with strengthening/mobility  Outcome: Progressing

## 2019-10-23 ENCOUNTER — TELEPHONE (OUTPATIENT)
Dept: OBGYN CLINIC | Facility: CLINIC | Age: 24
End: 2019-10-23

## 2019-10-28 ENCOUNTER — HOSPITAL ENCOUNTER (OUTPATIENT)
Dept: INFUSION CENTER | Facility: HOSPITAL | Age: 24
Discharge: HOME | End: 2019-10-28
Payer: COMMERCIAL

## 2019-10-28 VITALS
WEIGHT: 240.3 LBS | TEMPERATURE: 97.9 F | DIASTOLIC BLOOD PRESSURE: 70 MMHG | RESPIRATION RATE: 18 BRPM | SYSTOLIC BLOOD PRESSURE: 138 MMHG | BODY MASS INDEX: 39.99 KG/M2 | HEART RATE: 85 BPM

## 2019-10-28 DIAGNOSIS — D69.59 ALLOIMMUNE THROMBOCYTOPENIA DURING PREGNANCY, ANTEPARTUM: Primary | ICD-10-CM

## 2019-10-28 DIAGNOSIS — Z76.2: ICD-10-CM

## 2019-10-28 DIAGNOSIS — O99.119 ALLOIMMUNE THROMBOCYTOPENIA DURING PREGNANCY, ANTEPARTUM: Primary | ICD-10-CM

## 2019-10-28 PROCEDURE — 96365 THER/PROPH/DIAG IV INF INIT: CPT

## 2019-10-28 PROCEDURE — 96366 THER/PROPH/DIAG IV INF ADDON: CPT

## 2019-10-28 PROCEDURE — 96375 TX/PRO/DX INJ NEW DRUG ADDON: CPT

## 2019-10-28 RX ORDER — DIPHENHYDRAMINE HCL 25 MG
50 TABLET ORAL ONCE
Status: CANCELLED | OUTPATIENT
Start: 2019-11-05

## 2019-10-28 RX ORDER — DIPHENHYDRAMINE HCL 25 MG
25 TABLET ORAL ONCE
Status: CANCELLED
Start: 2019-11-05

## 2019-10-28 RX ORDER — SODIUM CHLORIDE 9 MG/ML
20 INJECTION, SOLUTION INTRAVENOUS ONCE
Status: CANCELLED | OUTPATIENT
Start: 2019-11-05

## 2019-10-28 RX ORDER — DIPHENHYDRAMINE HCL 25 MG
25 TABLET ORAL ONCE
Status: COMPLETED | OUTPATIENT
Start: 2019-10-28 | End: 2019-10-28

## 2019-10-28 RX ORDER — DIPHENHYDRAMINE HCL 25 MG
50 TABLET ORAL ONCE
Status: COMPLETED | OUTPATIENT
Start: 2019-10-28 | End: 2019-10-28

## 2019-10-28 RX ORDER — ACETAMINOPHEN 325 MG/1
650 TABLET ORAL ONCE
Status: CANCELLED | OUTPATIENT
Start: 2019-11-05

## 2019-10-28 RX ORDER — SODIUM CHLORIDE 9 MG/ML
20 INJECTION, SOLUTION INTRAVENOUS ONCE
Status: COMPLETED | OUTPATIENT
Start: 2019-10-28 | End: 2019-10-28

## 2019-10-28 RX ORDER — ACETAMINOPHEN 325 MG/1
650 TABLET ORAL ONCE
Status: COMPLETED | OUTPATIENT
Start: 2019-10-28 | End: 2019-10-28

## 2019-10-28 RX ADMIN — HYDROCORTISONE SODIUM SUCCINATE 100 MG: 100 INJECTION, POWDER, FOR SOLUTION INTRAMUSCULAR; INTRAVENOUS at 08:38

## 2019-10-28 RX ADMIN — Medication 215 G: at 09:17

## 2019-10-28 RX ADMIN — ACETAMINOPHEN 650 MG: 325 TABLET ORAL at 08:38

## 2019-10-28 RX ADMIN — DIPHENHYDRAMINE HCL 25 MG: 25 TABLET, COATED ORAL at 13:55

## 2019-10-28 RX ADMIN — DIPHENHYDRAMINE HCL 50 MG: 25 TABLET, COATED ORAL at 08:38

## 2019-10-28 RX ADMIN — SODIUM CHLORIDE 20 ML/HR: 0.9 INJECTION, SOLUTION INTRAVENOUS at 08:38

## 2019-10-29 ENCOUNTER — ROUTINE PRENATAL (OUTPATIENT)
Dept: OBGYN CLINIC | Facility: CLINIC | Age: 24
End: 2019-10-29

## 2019-10-29 VITALS
DIASTOLIC BLOOD PRESSURE: 78 MMHG | SYSTOLIC BLOOD PRESSURE: 122 MMHG | WEIGHT: 242.13 LBS | BODY MASS INDEX: 40.29 KG/M2

## 2019-10-29 DIAGNOSIS — Z34.83 ENCOUNTER FOR SUPERVISION OF NORMAL PREGNANCY IN MULTIGRAVIDA IN THIRD TRIMESTER: ICD-10-CM

## 2019-10-29 DIAGNOSIS — O09.93 SUPERVISION OF HIGH-RISK PREGNANCY, THIRD TRIMESTER: Primary | ICD-10-CM

## 2019-10-29 PROBLEM — N91.2 AMENORRHEA: Status: RESOLVED | Noted: 2019-05-10 | Resolved: 2019-10-29

## 2019-10-29 PROBLEM — T40.2X5A CONSTIPATION DUE TO OPIOID THERAPY: Status: RESOLVED | Noted: 2017-09-06 | Resolved: 2019-10-29

## 2019-10-29 PROBLEM — K59.03 CONSTIPATION DUE TO OPIOID THERAPY: Status: RESOLVED | Noted: 2017-09-06 | Resolved: 2019-10-29

## 2019-10-29 PROBLEM — O99.213 MATERNAL OBESITY SYNDROME IN THIRD TRIMESTER: Status: ACTIVE | Noted: 2019-08-04

## 2019-10-29 PROBLEM — K80.70 CHOLELITHIASIS WITH CHOLEDOCHOLITHIASIS: Status: RESOLVED | Noted: 2017-08-30 | Resolved: 2019-10-29

## 2019-10-29 PROBLEM — O09.292 HISTORY OF FETAL ABNORMALITY IN PREVIOUS PREGNANCY, CURRENTLY PREGNANT, SECOND TRIMESTER: Status: RESOLVED | Noted: 2019-08-04 | Resolved: 2019-10-29

## 2019-10-29 LAB
SL AMB  POCT GLUCOSE, UA: NEGATIVE
SL AMB POCT URINE PROTEIN: NEGATIVE

## 2019-10-29 PROCEDURE — PNV: Performed by: OBSTETRICS & GYNECOLOGY

## 2019-10-29 NOTE — ASSESSMENT & PLAN NOTE
H/o NAIT - undergoing IVIG infusions  On prednisone  Plan for induction of labor 37-38 weeks as per [facility]  nst biweekly    Vaccinations up to date    Signs of  labor and fetal kick counts discussed

## 2019-10-29 NOTE — PROGRESS NOTES
Problem List Items Addressed This Visit        Other    Supervision of high-risk pregnancy, third trimester - Primary     H/o NAIT - undergoing IVIG infusions  On prednisone  Plan for induction of labor 37-38 weeks as per [facility][1]  nst biweekly    Vaccinations up to date    Signs of  labor and fetal kick counts discussed

## 2019-10-29 NOTE — PATIENT INSTRUCTIONS
Pregnancy at 31 to 34 Weeks   AMBULATORY CARE:   What changes are happening to your body: You may continue to have symptoms such as shortness of breath, heartburn, contractions, or swelling of your ankles and feet  You may be gaining about 1 pound a week now  Seek care immediately if:   · You develop a severe headache that does not go away  · You have new or increased vision changes, such as blurred or spotted vision  · You have new or increased swelling in your face or hands  · You have vaginal spotting or bleeding  · Your water broke or you feel warm water gushing or trickling from your vagina  Contact your healthcare provider if:   · You have more than 5 contractions in 1 hour  · You notice any changes in your baby's movements  · You have abdominal cramps, pressure, or tightening  · You have a change in vaginal discharge  · You have chills or a fever  · You have vaginal itching, burning, or pain  · You have yellow, green, white, or foul-smelling vaginal discharge  · You have pain or burning when you urinate, less urine than usual, or pink or bloody urine  · You have questions or concerns about your condition or care  How to care for yourself at this stage of your pregnancy:   · Eat a variety of healthy foods  Healthy foods include fruits, vegetables, whole-grain breads, low-fat dairy foods, beans, lean meats, and fish  Drink liquids as directed  Ask how much liquid to drink each day and which liquids are best for you  Limit caffeine to less than 200 milligrams each day  Limit your intake of fish to 2 servings each week  Choose fish low in mercury such as canned light tuna, shrimp, salmon, cod, or tilapia  Do not  eat fish high in mercury such as swordfish, tilefish, peewee mackerel, and shark  · Manage heartburn  by eating 4 or 5 small meals each day instead of large meals  Avoid spicy food  · Manage swelling  by lying down and putting your feet up       · Take prenatal vitamins as directed  Your need for certain vitamins and minerals, such as folic acid, increases during pregnancy  Prenatal vitamins provide some of the extra vitamins and minerals you need  Prenatal vitamins may also help to decrease the risk of certain birth defects  · Talk to your healthcare provider about exercise  Moderate exercise can help you stay fit  Your healthcare provider will help you plan an exercise program that is safe for you during pregnancy  · Do not smoke  If you smoke, it is never too late to quit  Smoking increases your risk of a miscarriage and other health problems during your pregnancy  Smoking can cause your baby to be born too early or weigh less at birth  Ask your healthcare provider for information if you need help quitting  · Do not drink alcohol  Alcohol passes from your body to your baby through the placenta  It can affect your baby's brain development and cause fetal alcohol syndrome (FAS)  FAS is a group of conditions that causes mental, behavior, and growth problems  · Talk to your healthcare provider before you take any medicines  Many medicines may harm your baby if you take them when you are pregnant  Do not take any medicines, vitamins, herbs, or supplements without first talking to your healthcare provider  Never use illegal or street drugs (such as marijuana or cocaine) while you are pregnant  Safety tips during pregnancy:   · Avoid hot tubs and saunas  Do not use a hot tub or sauna while you are pregnant, especially during your first trimester  Hot tubs and saunas may raise your baby's temperature and increase the risk of birth defects  · Avoid toxoplasmosis  This is an infection caused by eating raw meat or being around infected cat feces  It can cause birth defects, miscarriages, and other problems  Wash your hands after you touch raw meat  Make sure any meat is well-cooked before you eat it  Avoid raw eggs and unpasteurized milk   Use gloves or ask someone else to clean your cat's litter box while you are pregnant  Changes that are happening with your baby:  By 34 weeks, your baby may weigh more than 5 pounds  Your baby will be about 12 ½ inches long from the top of the head to the rump (baby's bottom)  Your baby is gaining about ½ pound a week  Your baby's eyes open and close now  Your baby's kicks and movements are more forceful at this time  What you need to know about prenatal care: Your healthcare provider will check your blood pressure and weight  You may also need the following:  · A urine test  may also be done to check for sugar and protein  These can be signs of gestational diabetes or infection  Protein in your urine may also be a sign of preeclampsia  Preeclampsia is a condition that can develop during week 20 or later of your pregnancy  It causes high blood pressure, and it can cause problems with your kidneys and other organs  · A Tdap vaccine  may be recommended by your healthcare provider  · Fundal height  is a measurement of your uterus to check your baby's growth  This number is usually the same as the number of weeks that you have been pregnant  Your healthcare provider may also check your baby's position  · Your baby's heart rate  will be checked  © 2017 [address]  Information is for End User's use only and may not be sold, redistributed or otherwise used for commercial purposes  All illustrations and images included in CareNotes® are the copyrighted property of [employer]  or [employer]  The above information is an  only  It is not intended as medical advice for individual conditions or treatments  Talk to your doctor, nurse or pharmacist before following any medical regimen to see if it is safe and effective for you

## 2019-10-29 NOTE — PROGRESS NOTES
Pt reports GFM  Urine dip negative for glucose and protein  Patient has mfm appt Friday 11/1  Also getting infusion treatments once weekly

## 2019-11-01 ENCOUNTER — ULTRASOUND (OUTPATIENT)
Dept: PERINATAL CARE | Facility: OTHER | Age: 24
End: 2019-11-01
Payer: COMMERCIAL

## 2019-11-01 VITALS
HEART RATE: 84 BPM | DIASTOLIC BLOOD PRESSURE: 78 MMHG | SYSTOLIC BLOOD PRESSURE: 131 MMHG | HEIGHT: 65 IN | BODY MASS INDEX: 40.19 KG/M2 | WEIGHT: 241.2 LBS

## 2019-11-01 DIAGNOSIS — D69.59 ALLOIMMUNE THROMBOCYTOPENIA DURING PREGNANCY, ANTEPARTUM: Primary | ICD-10-CM

## 2019-11-01 DIAGNOSIS — Z3A.33 33 WEEKS GESTATION OF PREGNANCY: ICD-10-CM

## 2019-11-01 DIAGNOSIS — O99.213 MATERNAL MORBID OBESITY IN THIRD TRIMESTER, ANTEPARTUM (HCC): ICD-10-CM

## 2019-11-01 DIAGNOSIS — E66.01 MATERNAL MORBID OBESITY IN THIRD TRIMESTER, ANTEPARTUM (HCC): ICD-10-CM

## 2019-11-01 DIAGNOSIS — O99.119 ALLOIMMUNE THROMBOCYTOPENIA DURING PREGNANCY, ANTEPARTUM: Primary | ICD-10-CM

## 2019-11-01 PROCEDURE — 76816 OB US FOLLOW-UP PER FETUS: CPT | Performed by: OBSTETRICS & GYNECOLOGY

## 2019-11-01 PROCEDURE — 99212 OFFICE O/P EST SF 10 MIN: CPT | Performed by: OBSTETRICS & GYNECOLOGY

## 2019-11-01 RX ORDER — PREDNISONE 50 MG/1
50 TABLET ORAL DAILY
Qty: 28 TABLET | Refills: 0 | Status: SHIPPED | OUTPATIENT
Start: 2019-11-01 | End: 2019-12-01 | Stop reason: HOSPADM

## 2019-11-01 NOTE — PATIENT INSTRUCTIONS
Kick Counts in Pregnancy   WHAT YOU NEED TO KNOW:   Kick counts measure how much your baby is moving in your womb  A kick from your baby can be felt as a twist, turn, swish, roll, or jab  It is common to feel your baby kicking at 26 to 28 weeks of pregnancy  You may feel your baby kick as early as 20 weeks of pregnancy  DISCHARGE INSTRUCTIONS:   Return to the emergency department if:   · You feel your baby kick less as the day goes on      · You do not feel any kicks in a day  Contact your healthcare provider if:   · You feel a change in the number of kicks or movements of your baby  · You feel fewer than 10 kicks within 2 hours after counting twice  · You have questions or concerns about your baby's movements  Why measure kick counts:  Your baby's movement may provide information about your baby's health  He may move less, or not at all, if there are problems  He may move less if he does not have enough room to grow in your uterus (womb)  He may also move less if he is not getting enough oxygen or nutrition from the placenta  Tell your healthcare provider as soon as you feel a change in your baby's movements  Problems that are found earlier are easier to treat  When to measure kick counts:   · Measure kick counts at the same time every day  · Measure kick counts when your baby is awake and most active  Your baby may be most active in the evening  · Measure kick counts after a meal or snack  Your baby may be more active after you eat  Wait 2 hours after you drink liquids that contain caffeine  Caffeine can make your baby more active than usual     · You should not smoke while you are pregnant  Smoking increases the risk of health problems for you and for your baby during your pregnancy  If you do smoke, wait 2 hours to measure kick counts  Nicotine can make your baby more active than usual   How to measure kick counts:  Check that your baby is awake before you measure kick counts   You can wake up your baby by lightly pushing on your belly, walking, or drinking something cold  Your healthcare provider may tell you different ways to measure kick counts  He may tell you to do the following:  · Use a chart or clock to keep track of the time you start and finish counting  · Sit in a chair or lie on your left side  · Place your hands on the largest part of your belly  · Count until you reach 10 kicks  Write down how much time it takes to count 10 kicks  · It may take 30 minutes to 2 hours to count 10 kicks  It should not take more than 2 hours to count 10 kicks  · If you do not feel 10 kicks within 2 hours, wait 1 hour and count again  Your baby can sleep for up to 40 minutes at one time  Follow up with your healthcare provider as directed:  Write down your questions so you remember to ask them during your visits  © 2017 [address] Information is for End User's use only and may not be sold, redistributed or otherwise used for commercial purposes  All illustrations and images included in CareNotes® are the copyrighted property of [employer]  or [employer]  The above information is an  only  It is not intended as medical advice for individual conditions or treatments  Talk to your doctor, nurse or pharmacist before following any medical regimen to see if it is safe and effective for you

## 2019-11-01 NOTE — LETTER
November 1, 2019     Tyree Cespedes [address]  [address] [address]    Patient: Bettina Friedman   YOB: 1995   Date of Visit: 11/1/2019       Dear Dr Cally Sanchez: Thank you for referring Brayden Culp to me for evaluation  Below are my notes for this consultation  If you have questions, please do not hesitate to call me  I look forward to following your patient along with you  Sincerely,        Theron Caicedo MD        CC: No Recipients  Theron Caicedo MD  11/1/2019  4:21 PM  Sign at close encounter  Please refer to the [facility] ultrasound report in Ob Procedures for additional information regarding the visit to the [facility]  today

## 2019-11-01 NOTE — PROGRESS NOTES
Please refer to the [facility] ultrasound report in Ob Procedures for additional information regarding the visit to the [facility]  today

## 2019-11-05 ENCOUNTER — HOSPITAL ENCOUNTER (OUTPATIENT)
Dept: INFUSION CENTER | Facility: HOSPITAL | Age: 24
Discharge: HOME | End: 2019-11-05
Payer: COMMERCIAL

## 2019-11-05 VITALS
DIASTOLIC BLOOD PRESSURE: 61 MMHG | HEART RATE: 77 BPM | TEMPERATURE: 98.1 F | SYSTOLIC BLOOD PRESSURE: 129 MMHG | BODY MASS INDEX: 40.1 KG/M2 | RESPIRATION RATE: 18 BRPM | WEIGHT: 240.96 LBS

## 2019-11-05 DIAGNOSIS — D69.59 ALLOIMMUNE THROMBOCYTOPENIA DURING PREGNANCY, ANTEPARTUM: Primary | ICD-10-CM

## 2019-11-05 DIAGNOSIS — Z76.2: ICD-10-CM

## 2019-11-05 DIAGNOSIS — O99.119 ALLOIMMUNE THROMBOCYTOPENIA DURING PREGNANCY, ANTEPARTUM: Primary | ICD-10-CM

## 2019-11-05 PROCEDURE — 96375 TX/PRO/DX INJ NEW DRUG ADDON: CPT

## 2019-11-05 PROCEDURE — 96366 THER/PROPH/DIAG IV INF ADDON: CPT

## 2019-11-05 PROCEDURE — 96365 THER/PROPH/DIAG IV INF INIT: CPT

## 2019-11-05 RX ORDER — SODIUM CHLORIDE 9 MG/ML
20 INJECTION, SOLUTION INTRAVENOUS ONCE
Status: COMPLETED | OUTPATIENT
Start: 2019-11-05 | End: 2019-11-05

## 2019-11-05 RX ORDER — DIPHENHYDRAMINE HCL 25 MG
50 TABLET ORAL ONCE
Status: CANCELLED | OUTPATIENT
Start: 2019-11-11

## 2019-11-05 RX ORDER — DIPHENHYDRAMINE HCL 25 MG
25 TABLET ORAL ONCE
Status: COMPLETED | OUTPATIENT
Start: 2019-11-05 | End: 2019-11-05

## 2019-11-05 RX ORDER — SODIUM CHLORIDE 9 MG/ML
20 INJECTION, SOLUTION INTRAVENOUS ONCE
Status: CANCELLED | OUTPATIENT
Start: 2019-11-11

## 2019-11-05 RX ORDER — ACETAMINOPHEN 325 MG/1
650 TABLET ORAL ONCE
Status: CANCELLED | OUTPATIENT
Start: 2019-11-11

## 2019-11-05 RX ORDER — DIPHENHYDRAMINE HCL 25 MG
50 TABLET ORAL ONCE
Status: COMPLETED | OUTPATIENT
Start: 2019-11-05 | End: 2019-11-05

## 2019-11-05 RX ORDER — ACETAMINOPHEN 325 MG/1
650 TABLET ORAL ONCE
Status: COMPLETED | OUTPATIENT
Start: 2019-11-05 | End: 2019-11-05

## 2019-11-05 RX ORDER — DIPHENHYDRAMINE HCL 25 MG
25 TABLET ORAL ONCE
Status: CANCELLED
Start: 2019-11-11

## 2019-11-05 RX ADMIN — SODIUM CHLORIDE 20 ML/HR: 0.9 INJECTION, SOLUTION INTRAVENOUS at 08:43

## 2019-11-05 RX ADMIN — DIPHENHYDRAMINE HCL 25 MG: 25 TABLET, COATED ORAL at 12:50

## 2019-11-05 RX ADMIN — ACETAMINOPHEN 650 MG: 325 TABLET ORAL at 08:43

## 2019-11-05 RX ADMIN — HYDROCORTISONE SODIUM SUCCINATE 100 MG: 100 INJECTION, POWDER, FOR SOLUTION INTRAMUSCULAR; INTRAVENOUS at 08:49

## 2019-11-05 RX ADMIN — DIPHENHYDRAMINE HCL 50 MG: 25 TABLET, COATED ORAL at 08:43

## 2019-11-05 RX ADMIN — Medication 220 G: at 09:21

## 2019-11-07 ENCOUNTER — ULTRASOUND (OUTPATIENT)
Dept: PERINATAL CARE | Facility: OTHER | Age: 24
End: 2019-11-07
Payer: COMMERCIAL

## 2019-11-07 ENCOUNTER — TELEPHONE (OUTPATIENT)
Dept: PERINATAL CARE | Facility: OTHER | Age: 24
End: 2019-11-07

## 2019-11-07 VITALS
BODY MASS INDEX: 39.12 KG/M2 | SYSTOLIC BLOOD PRESSURE: 129 MMHG | HEIGHT: 66 IN | DIASTOLIC BLOOD PRESSURE: 69 MMHG | HEART RATE: 90 BPM | WEIGHT: 243.4 LBS

## 2019-11-07 DIAGNOSIS — D69.59 ALLOIMMUNE THROMBOCYTOPENIA DURING PREGNANCY, ANTEPARTUM: Primary | ICD-10-CM

## 2019-11-07 DIAGNOSIS — O99.213 MATERNAL OBESITY SYNDROME IN THIRD TRIMESTER: ICD-10-CM

## 2019-11-07 DIAGNOSIS — Z3A.34 34 WEEKS GESTATION OF PREGNANCY: ICD-10-CM

## 2019-11-07 DIAGNOSIS — O99.119 ALLOIMMUNE THROMBOCYTOPENIA DURING PREGNANCY, ANTEPARTUM: Primary | ICD-10-CM

## 2019-11-07 PROCEDURE — 76815 OB US LIMITED FETUS(S): CPT | Performed by: OBSTETRICS & GYNECOLOGY

## 2019-11-07 PROCEDURE — 59025 FETAL NON-STRESS TEST: CPT | Performed by: OBSTETRICS & GYNECOLOGY

## 2019-11-07 NOTE — PATIENT INSTRUCTIONS
Nonstress Test for Pregnancy   WHAT YOU NEED TO KNOW:   What do I need to know about a nonstress test?  A nonstress test measures your baby's heart rate and movements  Nonstress means that no stress will be placed on your baby during the test    How do I prepare for a nonstress test?  Your healthcare provider will talk to you about how to prepare for this test  He may tell you to eat and drink plenty of fluids before your test  If you smoke, you may be asked not to smoke within 2 hours before the test  He will also tell you what medicines to take or not take on the day of your test    What will happen during a nonstress test?  You may be asked to lie down or recline back for the test on a bed  One or two belts with sensors will be placed around your abdomen  Your baby's heart rate will be recorded with a machine  If your baby does not move, your baby may be asleep  Your healthcare provider may make a noise near your abdomen to try to wake your baby  The test usually takes about 20 minutes, but can take longer if your baby needs to be awakened  What do I need to know about the test results? Your baby will be expected to move at least twice for a certain amount of time  Your baby's heart rate will be expected to go up by a certain number of beats per minute during movement  If your baby does not move as expected, the test may need to be repeated or you may need other tests  CARE AGREEMENT:   You have the right to help plan your care  Learn about your health condition and how it may be treated  Discuss treatment options with your caregivers to decide what care you want to receive  You always have the right to refuse treatment  The above information is an  only  It is not intended as medical advice for individual conditions or treatments  Talk to your doctor, nurse or pharmacist before following any medical regimen to see if it is safe and effective for you    © 2017 [address]  Information is for End User's use only and may not be sold, redistributed or otherwise used for commercial purposes  All illustrations and images included in CareNotes® are the copyrighted property of [employer]  or [employer]  Kick Counts in Pregnancy   AMBULATORY CARE:   Kick counts  measure how much your baby is moving in your womb  A kick from your baby can be felt as a twist, turn, swish, roll, or jab  It is common to feel your baby kicking at 26 to 28 weeks of pregnancy  You may feel your baby kick as early as 20 weeks of pregnancy  Seek care immediately if:   · You feel your baby kick less as the day goes on      · You do not feel any kicks in a day  Contact your healthcare provider if:   · You feel a change in the number of kicks or movements of your baby  · You feel fewer than 10 kicks within 2 hours after counting twice  · You have questions or concerns about your baby's movements  Why measure kick counts:  Your baby's movement may provide information about your baby's health  He may move less, or not at all, if there are problems  He may move less if he does not have enough room to grow in your uterus (womb)  He may also move less if he is not getting enough oxygen or nutrition from the placenta  Tell your healthcare provider as soon as you feel a change in your baby's movements  Problems that are found earlier are easier to treat  When to measure kick counts:   · Measure kick counts at the same time every day  · Measure kick counts when your baby is awake and most active  Your baby may be most active in the evening  · Measure kick counts after a meal or snack  Your baby may be more active after you eat  Wait 2 hours after you drink liquids that contain caffeine  Caffeine can make your baby more active than usual     · You should not smoke while you are pregnant  Smoking increases the risk of health problems for you and for your baby during your pregnancy   If you do smoke, wait 2 hours to measure kick counts  Nicotine can make your baby more active than usual   How to measure kick counts:  Check that your baby is awake before you measure kick counts  You can wake up your baby by lightly pushing on your belly, walking, or drinking something cold  Your healthcare provider may tell you different ways to measure kick counts  He may tell you to do the following:  · Use a chart or clock to keep track of the time you start and finish counting  · Sit in a chair or lie on your left side  · Place your hands on the largest part of your belly  · Count until you reach 10 kicks  Write down how much time it takes to count 10 kicks  · It may take 30 minutes to 2 hours to count 10 kicks  It should not take more than 2 hours to count 10 kicks  · If you do not feel 10 kicks within 2 hours, wait 1 hour and count again  Your baby can sleep for up to 40 minutes at one time  Follow up with your healthcare provider as directed:  Write down your questions so you remember to ask them during your visits  © 2017 [address] [facility] Information is for End User's use only and may not be sold, redistributed or otherwise used for commercial purposes  All illustrations and images included in CareNotes® are the copyrighted property of [employer]  or [employer]  The above information is an  only  It is not intended as medical advice for individual conditions or treatments  Talk to your doctor, nurse or pharmacist before following any medical regimen to see if it is safe and effective for you

## 2019-11-07 NOTE — PROGRESS NOTES
NST procedure and expected outcome explained to patient  Daily fetal kick count discussed with handout given  Patient verbalized understanding of all and was receptive      Woo Marshall RN

## 2019-11-07 NOTE — LETTER
NST sleeve cover sheet    Patient name: Roxie Person      [id number]  : 1995  MRN: [id number]    ALBA: Estimated Date of Delivery: 19    Obstetrician:                                  Jordi Cooper                                                        Reason(s) for testing:                                             BMI > 40                   Testing frequency:    ___ 2x/wk  __x_ 1x/wk  ___ Dopplers  ___ BPP?       Last growth scan: __________________________________________

## 2019-11-11 ENCOUNTER — ROUTINE PRENATAL (OUTPATIENT)
Dept: OBGYN CLINIC | Facility: CLINIC | Age: 24
End: 2019-11-11

## 2019-11-11 ENCOUNTER — HOSPITAL ENCOUNTER (OUTPATIENT)
Dept: INFUSION CENTER | Facility: HOSPITAL | Age: 24
Discharge: HOME | End: 2019-11-11
Payer: COMMERCIAL

## 2019-11-11 VITALS
HEART RATE: 82 BPM | SYSTOLIC BLOOD PRESSURE: 140 MMHG | RESPIRATION RATE: 18 BRPM | DIASTOLIC BLOOD PRESSURE: 72 MMHG | TEMPERATURE: 98.1 F | BODY MASS INDEX: 39.14 KG/M2 | WEIGHT: 242.51 LBS

## 2019-11-11 VITALS — DIASTOLIC BLOOD PRESSURE: 62 MMHG | BODY MASS INDEX: 40 KG/M2 | WEIGHT: 247.8 LBS | SYSTOLIC BLOOD PRESSURE: 124 MMHG

## 2019-11-11 DIAGNOSIS — D69.59 ALLOIMMUNE THROMBOCYTOPENIA DURING PREGNANCY, ANTEPARTUM: Primary | ICD-10-CM

## 2019-11-11 DIAGNOSIS — Z34.83 ENCOUNTER FOR SUPERVISION OF NORMAL PREGNANCY IN MULTIGRAVIDA IN THIRD TRIMESTER: Primary | ICD-10-CM

## 2019-11-11 DIAGNOSIS — Z76.2: ICD-10-CM

## 2019-11-11 DIAGNOSIS — O99.119 ALLOIMMUNE THROMBOCYTOPENIA DURING PREGNANCY, ANTEPARTUM: Primary | ICD-10-CM

## 2019-11-11 LAB
SL AMB  POCT GLUCOSE, UA: NORMAL
SL AMB POCT URINE PROTEIN: NORMAL

## 2019-11-11 PROCEDURE — 96365 THER/PROPH/DIAG IV INF INIT: CPT

## 2019-11-11 PROCEDURE — 96366 THER/PROPH/DIAG IV INF ADDON: CPT

## 2019-11-11 PROCEDURE — PNV: Performed by: OBSTETRICS & GYNECOLOGY

## 2019-11-11 PROCEDURE — 96375 TX/PRO/DX INJ NEW DRUG ADDON: CPT

## 2019-11-11 RX ORDER — DIPHENHYDRAMINE HCL 25 MG
25 TABLET ORAL ONCE
Status: CANCELLED
Start: 2019-11-18

## 2019-11-11 RX ORDER — ACETAMINOPHEN 325 MG/1
650 TABLET ORAL ONCE
Status: CANCELLED | OUTPATIENT
Start: 2019-11-18

## 2019-11-11 RX ORDER — SODIUM CHLORIDE 9 MG/ML
20 INJECTION, SOLUTION INTRAVENOUS ONCE
Status: COMPLETED | OUTPATIENT
Start: 2019-11-11 | End: 2019-11-11

## 2019-11-11 RX ORDER — ACETAMINOPHEN 325 MG/1
650 TABLET ORAL ONCE
Status: COMPLETED | OUTPATIENT
Start: 2019-11-11 | End: 2019-11-11

## 2019-11-11 RX ORDER — SODIUM CHLORIDE 9 MG/ML
20 INJECTION, SOLUTION INTRAVENOUS ONCE
Status: CANCELLED | OUTPATIENT
Start: 2019-11-18

## 2019-11-11 RX ORDER — DIPHENHYDRAMINE HCL 25 MG
50 TABLET ORAL ONCE
Status: COMPLETED | OUTPATIENT
Start: 2019-11-11 | End: 2019-11-11

## 2019-11-11 RX ORDER — DIPHENHYDRAMINE HCL 25 MG
25 TABLET ORAL ONCE
Status: COMPLETED | OUTPATIENT
Start: 2019-11-11 | End: 2019-11-11

## 2019-11-11 RX ORDER — DIPHENHYDRAMINE HCL 25 MG
50 TABLET ORAL ONCE
Status: CANCELLED | OUTPATIENT
Start: 2019-11-18

## 2019-11-11 RX ADMIN — DIPHENHYDRAMINE HCL 25 MG: 25 TABLET, COATED ORAL at 13:54

## 2019-11-11 RX ADMIN — Medication 220 G: at 09:12

## 2019-11-11 RX ADMIN — HYDROCORTISONE SODIUM SUCCINATE 100 MG: 100 INJECTION, POWDER, FOR SOLUTION INTRAMUSCULAR; INTRAVENOUS at 08:37

## 2019-11-11 RX ADMIN — ACETAMINOPHEN 650 MG: 325 TABLET ORAL at 08:36

## 2019-11-11 RX ADMIN — DIPHENHYDRAMINE HCL 50 MG: 25 TABLET, COATED ORAL at 08:36

## 2019-11-11 RX ADMIN — SODIUM CHLORIDE 20 ML/HR: 0.9 INJECTION, SOLUTION INTRAVENOUS at 08:36

## 2019-11-11 NOTE — PLAN OF CARE
Problem: Potential for Falls  Goal: Patient will remain free of falls  Description  INTERVENTIONS:  - Assess patient frequently for physical needs  -  Identify cognitive and physical deficits and behaviors that affect risk of falls    -  [place] fall precautions as indicated by assessment   - Educate patient/family on patient safety including physical limitations  - Instruct patient to call for assistance with activity based on assessment  - Modify environment to reduce risk of injury  - Consider OT/PT consult to assist with strengthening/mobility  Outcome: Progressing

## 2019-11-12 ENCOUNTER — ROUTINE PRENATAL (OUTPATIENT)
Dept: PERINATAL CARE | Facility: OTHER | Age: 24
End: 2019-11-12
Payer: COMMERCIAL

## 2019-11-12 VITALS
DIASTOLIC BLOOD PRESSURE: 69 MMHG | HEIGHT: 66 IN | SYSTOLIC BLOOD PRESSURE: 110 MMHG | HEART RATE: 76 BPM | BODY MASS INDEX: 39.63 KG/M2 | WEIGHT: 246.6 LBS

## 2019-11-12 DIAGNOSIS — D69.59 ALLOIMMUNE THROMBOCYTOPENIA DURING PREGNANCY, ANTEPARTUM: ICD-10-CM

## 2019-11-12 DIAGNOSIS — O99.213 MATERNAL OBESITY SYNDROME IN THIRD TRIMESTER: ICD-10-CM

## 2019-11-12 DIAGNOSIS — O09.93 SUPERVISION OF HIGH-RISK PREGNANCY, THIRD TRIMESTER: ICD-10-CM

## 2019-11-12 DIAGNOSIS — Z76.2: Primary | ICD-10-CM

## 2019-11-12 DIAGNOSIS — Z3A.35 35 WEEKS GESTATION OF PREGNANCY: ICD-10-CM

## 2019-11-12 DIAGNOSIS — O99.119 ALLOIMMUNE THROMBOCYTOPENIA DURING PREGNANCY, ANTEPARTUM: ICD-10-CM

## 2019-11-12 PROCEDURE — 59025 FETAL NON-STRESS TEST: CPT | Performed by: OBSTETRICS & GYNECOLOGY

## 2019-11-12 NOTE — PROGRESS NOTES
Daily fetal kick count reviewed and emphasized  Patient verbalized understanding of all and was receptive      Taniya Knapp RN

## 2019-11-14 NOTE — PROGRESS NOTES
Patient is due to receive Gamunex-C infusion on 19 dosed at 2000 mg/kg  Due to the patients pregnancy with weight gain, her dose on Monday is 225 grams  The clinical coordinator of pharmacy reached out to Dr Raghavendra Belle to discuss this patients case and make a recommendation to alter doses due to the ongoing IVIG shortage impacting patient care on a national level  Dr Raghavendra Belle agreed to not exceed a max dose of 220 grams for this patient as she has a scheduled  at the end of November with an ALBA of 19  Pharmacy, upon verification, has changed the 225 grams dose to 220 grams per this conversation

## 2019-11-15 ENCOUNTER — ULTRASOUND (OUTPATIENT)
Dept: PERINATAL CARE | Facility: OTHER | Age: 24
End: 2019-11-15
Payer: COMMERCIAL

## 2019-11-15 VITALS
WEIGHT: 245 LBS | SYSTOLIC BLOOD PRESSURE: 126 MMHG | BODY MASS INDEX: 40.82 KG/M2 | DIASTOLIC BLOOD PRESSURE: 68 MMHG | HEIGHT: 65 IN | HEART RATE: 91 BPM

## 2019-11-15 DIAGNOSIS — E66.01 MATERNAL MORBID OBESITY IN THIRD TRIMESTER, ANTEPARTUM (HCC): ICD-10-CM

## 2019-11-15 DIAGNOSIS — D69.59 ALLOIMMUNE THROMBOCYTOPENIA DURING PREGNANCY, ANTEPARTUM: Primary | ICD-10-CM

## 2019-11-15 DIAGNOSIS — Z3A.35 35 WEEKS GESTATION OF PREGNANCY: ICD-10-CM

## 2019-11-15 DIAGNOSIS — O99.213 MATERNAL MORBID OBESITY IN THIRD TRIMESTER, ANTEPARTUM (HCC): ICD-10-CM

## 2019-11-15 DIAGNOSIS — O99.119 ALLOIMMUNE THROMBOCYTOPENIA DURING PREGNANCY, ANTEPARTUM: Primary | ICD-10-CM

## 2019-11-15 PROCEDURE — 59025 FETAL NON-STRESS TEST: CPT | Performed by: OBSTETRICS & GYNECOLOGY

## 2019-11-15 PROCEDURE — 76815 OB US LIMITED FETUS(S): CPT | Performed by: OBSTETRICS & GYNECOLOGY

## 2019-11-15 NOTE — PROGRESS NOTES
Daily fetal kick count reviewed and emphasized  Patient verbalized understanding of all and was receptive      Narda Tidwell RN

## 2019-11-15 NOTE — PROGRESS NOTES
Please refer to the ultrasound report in Ob Procedures for additional information regarding the evaluation today

## 2019-11-18 ENCOUNTER — HOSPITAL ENCOUNTER (OUTPATIENT)
Dept: INFUSION CENTER | Facility: HOSPITAL | Age: 24
Discharge: HOME | End: 2019-11-18
Payer: COMMERCIAL

## 2019-11-18 VITALS
HEART RATE: 84 BPM | SYSTOLIC BLOOD PRESSURE: 130 MMHG | WEIGHT: 243.39 LBS | TEMPERATURE: 97.9 F | BODY MASS INDEX: 40.5 KG/M2 | RESPIRATION RATE: 18 BRPM | DIASTOLIC BLOOD PRESSURE: 62 MMHG

## 2019-11-18 DIAGNOSIS — O99.119 ALLOIMMUNE THROMBOCYTOPENIA DURING PREGNANCY, ANTEPARTUM: Primary | ICD-10-CM

## 2019-11-18 DIAGNOSIS — D69.59 ALLOIMMUNE THROMBOCYTOPENIA DURING PREGNANCY, ANTEPARTUM: Primary | ICD-10-CM

## 2019-11-18 DIAGNOSIS — Z76.2: ICD-10-CM

## 2019-11-18 PROCEDURE — 96366 THER/PROPH/DIAG IV INF ADDON: CPT

## 2019-11-18 PROCEDURE — 96365 THER/PROPH/DIAG IV INF INIT: CPT

## 2019-11-18 PROCEDURE — 96375 TX/PRO/DX INJ NEW DRUG ADDON: CPT

## 2019-11-18 RX ORDER — DIPHENHYDRAMINE HCL 25 MG
25 TABLET ORAL ONCE
Status: CANCELLED
Start: 2019-11-25

## 2019-11-18 RX ORDER — DIPHENHYDRAMINE HCL 25 MG
25 TABLET ORAL ONCE
Status: COMPLETED | OUTPATIENT
Start: 2019-11-18 | End: 2019-11-18

## 2019-11-18 RX ORDER — SODIUM CHLORIDE 9 MG/ML
20 INJECTION, SOLUTION INTRAVENOUS ONCE
Status: COMPLETED | OUTPATIENT
Start: 2019-11-18 | End: 2019-11-18

## 2019-11-18 RX ORDER — ACETAMINOPHEN 325 MG/1
650 TABLET ORAL ONCE
Status: COMPLETED | OUTPATIENT
Start: 2019-11-18 | End: 2019-11-18

## 2019-11-18 RX ORDER — DIPHENHYDRAMINE HCL 25 MG
50 TABLET ORAL ONCE
Status: CANCELLED | OUTPATIENT
Start: 2019-11-25

## 2019-11-18 RX ORDER — DIPHENHYDRAMINE HCL 25 MG
50 TABLET ORAL ONCE
Status: COMPLETED | OUTPATIENT
Start: 2019-11-18 | End: 2019-11-18

## 2019-11-18 RX ORDER — SODIUM CHLORIDE 9 MG/ML
20 INJECTION, SOLUTION INTRAVENOUS ONCE
Status: CANCELLED | OUTPATIENT
Start: 2019-11-25

## 2019-11-18 RX ORDER — ACETAMINOPHEN 325 MG/1
650 TABLET ORAL ONCE
Status: CANCELLED | OUTPATIENT
Start: 2019-11-25

## 2019-11-18 RX ADMIN — DIPHENHYDRAMINE HCL 50 MG: 25 TABLET, COATED ORAL at 08:14

## 2019-11-18 RX ADMIN — Medication 220 G: at 09:10

## 2019-11-18 RX ADMIN — DIPHENHYDRAMINE HCL 25 MG: 25 TABLET, COATED ORAL at 12:14

## 2019-11-18 RX ADMIN — HYDROCORTISONE SODIUM SUCCINATE 100 MG: 100 INJECTION, POWDER, FOR SOLUTION INTRAMUSCULAR; INTRAVENOUS at 08:17

## 2019-11-18 RX ADMIN — SODIUM CHLORIDE 20 ML/HR: 0.9 INJECTION, SOLUTION INTRAVENOUS at 08:14

## 2019-11-18 RX ADMIN — ACETAMINOPHEN 650 MG: 325 TABLET ORAL at 08:14

## 2019-11-18 NOTE — PLAN OF CARE
Problem: Potential for Falls  Goal: Patient will remain free of falls  Description  INTERVENTIONS:  - Assess patient frequently for physical needs  -  Identify cognitive and physical deficits and behaviors that affect risk of falls    -  [place] fall precautions as indicated by assessment   - Educate patient/family on patient safety including physical limitations  - Instruct patient to call for assistance with activity based on assessment  - Modify environment to reduce risk of injury  - Consider OT/PT consult to assist with strengthening/mobility  Outcome: Progressing

## 2019-11-22 ENCOUNTER — ULTRASOUND (OUTPATIENT)
Dept: PERINATAL CARE | Facility: OTHER | Age: 24
End: 2019-11-22
Payer: COMMERCIAL

## 2019-11-22 ENCOUNTER — APPOINTMENT (OUTPATIENT)
Dept: PERINATAL CARE | Facility: OTHER | Age: 24
End: 2019-11-22
Payer: COMMERCIAL

## 2019-11-22 VITALS
SYSTOLIC BLOOD PRESSURE: 117 MMHG | DIASTOLIC BLOOD PRESSURE: 73 MMHG | BODY MASS INDEX: 41.02 KG/M2 | HEIGHT: 65 IN | WEIGHT: 246.2 LBS | HEART RATE: 97 BPM

## 2019-11-22 DIAGNOSIS — O99.213 MATERNAL MORBID OBESITY IN THIRD TRIMESTER, ANTEPARTUM (HCC): ICD-10-CM

## 2019-11-22 DIAGNOSIS — E66.01 MATERNAL MORBID OBESITY IN THIRD TRIMESTER, ANTEPARTUM (HCC): ICD-10-CM

## 2019-11-22 DIAGNOSIS — D69.59 ALLOIMMUNE THROMBOCYTOPENIA DURING PREGNANCY, ANTEPARTUM: ICD-10-CM

## 2019-11-22 DIAGNOSIS — Z3A.36 36 WEEKS GESTATION OF PREGNANCY: Primary | ICD-10-CM

## 2019-11-22 DIAGNOSIS — O99.119 ALLOIMMUNE THROMBOCYTOPENIA DURING PREGNANCY, ANTEPARTUM: ICD-10-CM

## 2019-11-22 PROCEDURE — 59025 FETAL NON-STRESS TEST: CPT | Performed by: OBSTETRICS & GYNECOLOGY

## 2019-11-22 PROCEDURE — 76816 OB US FOLLOW-UP PER FETUS: CPT | Performed by: OBSTETRICS & GYNECOLOGY

## 2019-11-22 RX ORDER — FERROUS SULFATE 325(65) MG
325 TABLET ORAL
COMMUNITY
End: 2019-12-05

## 2019-11-22 NOTE — PROGRESS NOTES
Daily fetal kick count reviewed and emphasized  Patient verbalized understanding of all and was receptive      Doreen Leyden, RN
Please refer to the [facility] ultrasound report in Ob Procedures for additional information regarding the visit to the [address] [place] [address] today 
Hpi Title: Evaluation of Skin Lesions
Year Removed: 1900

## 2019-11-22 NOTE — LETTER
November 22, 2019     Marah Fgiueroa, [address]    Patient: Wilfred Lambert   YOB: 1995   Date of Visit: 11/22/2019       Dear Dr Sonia Syed: Thank you for referring Ehsan Cagle to me for evaluation  Below are my notes for this consultation  If you have questions, please do not hesitate to call me  I look forward to following your patient along with you  Sincerely,        Bia Irene MD        CC: No Recipients  Bia Irene MD  11/22/2019  3:17 PM  Sign at close encounter  Please refer to the [facility] ultrasound report in Ob Procedures for additional information regarding the visit to the [facility]  today

## 2019-11-25 ENCOUNTER — HOSPITAL ENCOUNTER (OUTPATIENT)
Dept: INFUSION CENTER | Facility: HOSPITAL | Age: 24
Discharge: HOME | End: 2019-11-25
Payer: COMMERCIAL

## 2019-11-25 VITALS
SYSTOLIC BLOOD PRESSURE: 126 MMHG | WEIGHT: 246.03 LBS | BODY MASS INDEX: 40.94 KG/M2 | DIASTOLIC BLOOD PRESSURE: 74 MMHG | HEART RATE: 92 BPM | TEMPERATURE: 97.6 F | RESPIRATION RATE: 18 BRPM

## 2019-11-25 DIAGNOSIS — O99.119 ALLOIMMUNE THROMBOCYTOPENIA DURING PREGNANCY, ANTEPARTUM: Primary | ICD-10-CM

## 2019-11-25 DIAGNOSIS — Z76.2: ICD-10-CM

## 2019-11-25 DIAGNOSIS — D69.59 ALLOIMMUNE THROMBOCYTOPENIA DURING PREGNANCY, ANTEPARTUM: Primary | ICD-10-CM

## 2019-11-25 PROCEDURE — 96365 THER/PROPH/DIAG IV INF INIT: CPT

## 2019-11-25 PROCEDURE — 96366 THER/PROPH/DIAG IV INF ADDON: CPT

## 2019-11-25 PROCEDURE — 96375 TX/PRO/DX INJ NEW DRUG ADDON: CPT

## 2019-11-25 RX ORDER — ACETAMINOPHEN 325 MG/1
650 TABLET ORAL ONCE
Status: CANCELLED | OUTPATIENT
Start: 2019-12-02

## 2019-11-25 RX ORDER — SODIUM CHLORIDE 9 MG/ML
20 INJECTION, SOLUTION INTRAVENOUS ONCE
Status: CANCELLED | OUTPATIENT
Start: 2019-12-02

## 2019-11-25 RX ORDER — DIPHENHYDRAMINE HCL 25 MG
25 CAPSULE ORAL ONCE
Status: CANCELLED
Start: 2019-12-02

## 2019-11-25 RX ORDER — ACETAMINOPHEN 325 MG/1
650 TABLET ORAL ONCE
Status: COMPLETED | OUTPATIENT
Start: 2019-11-25 | End: 2019-11-25

## 2019-11-25 RX ORDER — DIPHENHYDRAMINE HCL 25 MG
50 TABLET ORAL ONCE
Status: COMPLETED | OUTPATIENT
Start: 2019-11-25 | End: 2019-11-25

## 2019-11-25 RX ORDER — SODIUM CHLORIDE 9 MG/ML
20 INJECTION, SOLUTION INTRAVENOUS ONCE
Status: COMPLETED | OUTPATIENT
Start: 2019-11-25 | End: 2019-11-25

## 2019-11-25 RX ORDER — DIPHENHYDRAMINE HCL 25 MG
50 TABLET ORAL ONCE
Status: CANCELLED | OUTPATIENT
Start: 2019-12-02

## 2019-11-25 RX ORDER — DIPHENHYDRAMINE HCL 25 MG
25 TABLET ORAL ONCE
Status: COMPLETED | OUTPATIENT
Start: 2019-11-25 | End: 2019-11-25

## 2019-11-25 RX ADMIN — HYDROCORTISONE SODIUM SUCCINATE 100 MG: 100 INJECTION, POWDER, FOR SOLUTION INTRAMUSCULAR; INTRAVENOUS at 09:11

## 2019-11-25 RX ADMIN — DIPHENHYDRAMINE HCL 50 MG: 25 TABLET, COATED ORAL at 09:12

## 2019-11-25 RX ADMIN — Medication 220 G: at 10:29

## 2019-11-25 RX ADMIN — SODIUM CHLORIDE 20 ML/HR: 0.9 INJECTION, SOLUTION INTRAVENOUS at 09:11

## 2019-11-25 RX ADMIN — ACETAMINOPHEN 650 MG: 325 TABLET ORAL at 09:12

## 2019-11-25 RX ADMIN — DIPHENHYDRAMINE HCL 25 MG: 25 TABLET, COATED ORAL at 12:17

## 2019-11-25 NOTE — PLAN OF CARE
Problem: Potential for Falls  Goal: Patient will remain free of falls  Description  INTERVENTIONS:  - Assess patient frequently for physical needs  -  Identify cognitive and physical deficits and behaviors that affect risk of falls    -  [place] fall precautions as indicated by assessment   - Educate patient/family on patient safety including physical limitations  - Instruct patient to call for assistance with activity based on assessment  - Modify environment to reduce risk of injury  - Consider OT/PT consult to assist with strengthening/mobility  Outcome: Progressing

## 2019-11-26 ENCOUNTER — ROUTINE PRENATAL (OUTPATIENT)
Dept: OBGYN CLINIC | Age: 24
End: 2019-11-26

## 2019-11-26 ENCOUNTER — ULTRASOUND (OUTPATIENT)
Dept: PERINATAL CARE | Facility: OTHER | Age: 24
End: 2019-11-26
Payer: COMMERCIAL

## 2019-11-26 VITALS
SYSTOLIC BLOOD PRESSURE: 122 MMHG | HEIGHT: 65 IN | WEIGHT: 248.6 LBS | DIASTOLIC BLOOD PRESSURE: 76 MMHG | HEART RATE: 88 BPM | BODY MASS INDEX: 41.42 KG/M2

## 2019-11-26 VITALS — SYSTOLIC BLOOD PRESSURE: 124 MMHG | DIASTOLIC BLOOD PRESSURE: 72 MMHG | WEIGHT: 248.8 LBS | BODY MASS INDEX: 41.4 KG/M2

## 2019-11-26 DIAGNOSIS — O99.213 MATERNAL MORBID OBESITY IN THIRD TRIMESTER, ANTEPARTUM (HCC): Primary | ICD-10-CM

## 2019-11-26 DIAGNOSIS — D69.59 ALLOIMMUNE THROMBOCYTOPENIA DURING PREGNANCY, ANTEPARTUM: ICD-10-CM

## 2019-11-26 DIAGNOSIS — O09.93 SUPERVISION OF HIGH-RISK PREGNANCY, THIRD TRIMESTER: Primary | ICD-10-CM

## 2019-11-26 DIAGNOSIS — O99.119 ALLOIMMUNE THROMBOCYTOPENIA DURING PREGNANCY, ANTEPARTUM: ICD-10-CM

## 2019-11-26 DIAGNOSIS — O99.213 MATERNAL MORBID OBESITY IN THIRD TRIMESTER, ANTEPARTUM (HCC): ICD-10-CM

## 2019-11-26 DIAGNOSIS — E66.01 MATERNAL MORBID OBESITY IN THIRD TRIMESTER, ANTEPARTUM (HCC): Primary | ICD-10-CM

## 2019-11-26 DIAGNOSIS — E66.01 MATERNAL MORBID OBESITY IN THIRD TRIMESTER, ANTEPARTUM (HCC): ICD-10-CM

## 2019-11-26 DIAGNOSIS — Z3A.37 37 WEEKS GESTATION OF PREGNANCY: ICD-10-CM

## 2019-11-26 LAB
SL AMB  POCT GLUCOSE, UA: NORMAL
SL AMB POCT URINE PROTEIN: NORMAL

## 2019-11-26 PROCEDURE — 59025 FETAL NON-STRESS TEST: CPT | Performed by: OBSTETRICS & GYNECOLOGY

## 2019-11-26 PROCEDURE — 76815 OB US LIMITED FETUS(S): CPT | Performed by: OBSTETRICS & GYNECOLOGY

## 2019-11-26 PROCEDURE — PNV: Performed by: STUDENT IN AN ORGANIZED HEALTH CARE EDUCATION/TRAINING PROGRAM

## 2019-11-26 PROCEDURE — 87653 STREP B DNA AMP PROBE: CPT | Performed by: STUDENT IN AN ORGANIZED HEALTH CARE EDUCATION/TRAINING PROGRAM

## 2019-11-26 NOTE — PATIENT INSTRUCTIONS

## 2019-11-26 NOTE — PROGRESS NOTES
23yo [id number] at 37wk3d, here for return OB visit  Feeling well overall and without concerns  Good FM  Denies LOF, VB, contractions  Denies dysuria, hematuria  No questions/concerns       Problem List Items Addressed This Visit        Hematopoietic and Hemostatic    Alloimmune thrombocytopenia during pregnancy, antepartum     -prednisone, IVIG  -NST/LYN performed today, wnl  -delivery this week via rLTCS            Other    Supervision of high-risk pregnancy, third trimester - Primary     -precautions reviewed  -s/p flu/tdap  -GBS collected today  -rLTCS scheduled          37 weeks gestation of pregnancy    Relevant Orders    POCT urine dip (Completed)    Strep B DNA probe, amplification    Maternal morbid obesity in third trimester, antepartum ([facility] [address] )     -pregravid BMI 36, TWG 30# Recommended continued exercise/activity  -s/p 36wk growth, continued  testing twice weekly

## 2019-11-26 NOTE — PROGRESS NOTES
[address]: Ms Randal Krishna was seen today at 37w3d for NST (found under the pregnancy episode) which I reviewed the RN assessment and agree, and LYN (see ultrasound report under OB procedures tab)  Please don't hesitate to contact our office with any concerns or questions    Lu Garza MD

## 2019-11-26 NOTE — ASSESSMENT & PLAN NOTE
-pregravid BMI 36, TWG 30# Recommended continued exercise/activity  -s/p 36wk growth, continued  testing twice weekly

## 2019-11-28 LAB — GP B STREP DNA SPEC QL NAA+PROBE: NORMAL

## 2019-11-29 ENCOUNTER — HOSPITAL ENCOUNTER (INPATIENT)
Facility: HOSPITAL | Age: 24
LOS: 2 days | Discharge: HOME | End: 2019-12-01
Attending: STUDENT IN AN ORGANIZED HEALTH CARE EDUCATION/TRAINING PROGRAM | Admitting: OBSTETRICS & GYNECOLOGY
Payer: COMMERCIAL

## 2019-11-29 ENCOUNTER — ANESTHESIA EVENT (INPATIENT)
Dept: LABOR AND DELIVERY | Facility: HOSPITAL | Age: 24
End: 2019-11-29
Payer: COMMERCIAL

## 2019-11-29 ENCOUNTER — ANESTHESIA (INPATIENT)
Dept: LABOR AND DELIVERY | Facility: HOSPITAL | Age: 24
End: 2019-11-29
Payer: COMMERCIAL

## 2019-11-29 DIAGNOSIS — Z98.891 STATUS POST REPEAT LOW TRANSVERSE CESAREAN SECTION: ICD-10-CM

## 2019-11-29 DIAGNOSIS — Z3A.37 37 WEEKS GESTATION OF PREGNANCY: Primary | ICD-10-CM

## 2019-11-29 DIAGNOSIS — O34.219 PREVIOUS CESAREAN SECTION COMPLICATING PREGNANCY, WITH DELIVERY: ICD-10-CM

## 2019-11-29 LAB
ABO GROUP BLD: NORMAL
BASE EXCESS BLDCOV CALC-SCNC: -3.9 MMOL/L (ref 1–9)
BLD GP AB SCN SERPL QL: NEGATIVE
ERYTHROCYTE [DISTWIDTH] IN BLOOD BY AUTOMATED COUNT: 13.6 % (ref 11.6–15.1)
HCO3 BLDCOV-SCNC: 23.8 MMOL/L (ref 12.2–28.6)
HCT VFR BLD AUTO: 37.5 % (ref 34.8–46.1)
HGB BLD-MCNC: 12.1 G/DL (ref 11.5–15.4)
MCH RBC QN AUTO: 27.8 PG (ref 26.8–34.3)
MCHC RBC AUTO-ENTMCNC: 32.3 G/DL (ref 31.4–37.4)
MCV RBC AUTO: 86 FL (ref 82–98)
OXYHGB MFR BLDCOV: 41.4 %
PCO2 BLDCOV: 52.3 MM HG (ref 27–43)
PH BLDCOV: 7.28 [PH] (ref 7.19–7.49)
PLATELET # BLD AUTO: 239 THOUSANDS/UL (ref 149–390)
PMV BLD AUTO: 10.2 FL (ref 8.9–12.7)
PO2 BLDCOV: 19.5 MM HG (ref 15–45)
RBC # BLD AUTO: 4.35 MILLION/UL (ref 3.81–5.12)
RH BLD: POSITIVE
RPR SER QL: NORMAL
SAO2 % BLDCOV: 10.6 ML/DL
SPECIMEN EXPIRATION DATE: NORMAL
WBC # BLD AUTO: 6.59 THOUSAND/UL (ref 4.31–10.16)

## 2019-11-29 PROCEDURE — 82805 BLOOD GASES W/O2 SATURATION: CPT | Performed by: OBSTETRICS & GYNECOLOGY

## 2019-11-29 PROCEDURE — 86900 BLOOD TYPING SEROLOGIC ABO: CPT | Performed by: OBSTETRICS & GYNECOLOGY

## 2019-11-29 PROCEDURE — 88307 TISSUE EXAM BY PATHOLOGIST: CPT | Performed by: PATHOLOGY

## 2019-11-29 PROCEDURE — 4A1HXCZ MONITORING OF PRODUCTS OF CONCEPTION, CARDIAC RATE, EXTERNAL APPROACH: ICD-10-PCS | Performed by: OBSTETRICS & GYNECOLOGY

## 2019-11-29 PROCEDURE — 86592 SYPHILIS TEST NON-TREP QUAL: CPT | Performed by: OBSTETRICS & GYNECOLOGY

## 2019-11-29 PROCEDURE — 86901 BLOOD TYPING SEROLOGIC RH(D): CPT | Performed by: OBSTETRICS & GYNECOLOGY

## 2019-11-29 PROCEDURE — 85027 COMPLETE CBC AUTOMATED: CPT | Performed by: OBSTETRICS & GYNECOLOGY

## 2019-11-29 PROCEDURE — 99024 POSTOP FOLLOW-UP VISIT: CPT | Performed by: OBSTETRICS & GYNECOLOGY

## 2019-11-29 PROCEDURE — 86850 RBC ANTIBODY SCREEN: CPT | Performed by: OBSTETRICS & GYNECOLOGY

## 2019-11-29 PROCEDURE — 59510 CESAREAN DELIVERY: CPT | Performed by: OBSTETRICS & GYNECOLOGY

## 2019-11-29 RX ORDER — CEFAZOLIN SODIUM 2 G/50ML
2000 SOLUTION INTRAVENOUS ONCE
Status: COMPLETED | OUTPATIENT
Start: 2019-11-29 | End: 2019-11-29

## 2019-11-29 RX ORDER — HYDROMORPHONE HCL/PF 1 MG/ML
1 SYRINGE (ML) INJECTION EVERY 2 HOUR PRN
Status: DISCONTINUED | OUTPATIENT
Start: 2019-11-30 | End: 2019-12-01 | Stop reason: HOSPADM

## 2019-11-29 RX ORDER — DIAPER,BRIEF,INFANT-TODD,DISP
1 EACH MISCELLANEOUS 4 TIMES DAILY PRN
Status: DISCONTINUED | OUTPATIENT
Start: 2019-11-29 | End: 2019-12-01 | Stop reason: HOSPADM

## 2019-11-29 RX ORDER — OXYCODONE HYDROCHLORIDE AND ACETAMINOPHEN 5; 325 MG/1; MG/1
2 TABLET ORAL EVERY 4 HOURS PRN
Status: DISCONTINUED | OUTPATIENT
Start: 2019-11-30 | End: 2019-12-01 | Stop reason: HOSPADM

## 2019-11-29 RX ORDER — OXYTOCIN/RINGER'S LACTATE 30/500 ML
PLASTIC BAG, INJECTION (ML) INTRAVENOUS CONTINUOUS PRN
Status: DISCONTINUED | OUTPATIENT
Start: 2019-11-29 | End: 2019-11-29 | Stop reason: SURG

## 2019-11-29 RX ORDER — METHYLPREDNISOLONE SODIUM SUCCINATE 125 MG/2ML
100 INJECTION, POWDER, LYOPHILIZED, FOR SOLUTION INTRAMUSCULAR; INTRAVENOUS EVERY 8 HOURS SCHEDULED
Status: DISCONTINUED | OUTPATIENT
Start: 2019-11-29 | End: 2019-11-29

## 2019-11-29 RX ORDER — CALCIUM CARBONATE 200(500)MG
1000 TABLET,CHEWABLE ORAL DAILY PRN
Status: DISCONTINUED | OUTPATIENT
Start: 2019-11-29 | End: 2019-12-01 | Stop reason: HOSPADM

## 2019-11-29 RX ORDER — KETOROLAC TROMETHAMINE 30 MG/ML
30 INJECTION, SOLUTION INTRAMUSCULAR; INTRAVENOUS EVERY 6 HOURS
Status: COMPLETED | OUTPATIENT
Start: 2019-11-29 | End: 2019-11-30

## 2019-11-29 RX ORDER — SODIUM CHLORIDE, SODIUM LACTATE, POTASSIUM CHLORIDE, CALCIUM CHLORIDE 600; 310; 30; 20 MG/100ML; MG/100ML; MG/100ML; MG/100ML
125 INJECTION, SOLUTION INTRAVENOUS CONTINUOUS
Status: DISCONTINUED | OUTPATIENT
Start: 2019-11-29 | End: 2019-11-29

## 2019-11-29 RX ORDER — OXYCODONE HYDROCHLORIDE AND ACETAMINOPHEN 5; 325 MG/1; MG/1
1 TABLET ORAL EVERY 4 HOURS PRN
Status: DISCONTINUED | OUTPATIENT
Start: 2019-11-30 | End: 2019-12-01 | Stop reason: HOSPADM

## 2019-11-29 RX ORDER — DEXAMETHASONE SODIUM PHOSPHATE 10 MG/ML
8 INJECTION, SOLUTION INTRAMUSCULAR; INTRAVENOUS ONCE AS NEEDED
Status: DISPENSED | OUTPATIENT
Start: 2019-11-29 | End: 2019-11-30

## 2019-11-29 RX ORDER — METHYLPREDNISOLONE SODIUM SUCCINATE 40 MG/ML
30 INJECTION, POWDER, LYOPHILIZED, FOR SOLUTION INTRAMUSCULAR; INTRAVENOUS DAILY
Status: DISCONTINUED | OUTPATIENT
Start: 2019-12-07 | End: 2019-11-29

## 2019-11-29 RX ORDER — HYDROMORPHONE HCL/PF 1 MG/ML
0.25 SYRINGE (ML) INJECTION
Status: CANCELLED | OUTPATIENT
Start: 2019-11-29

## 2019-11-29 RX ORDER — EPHEDRINE SULFATE 50 MG/ML
INJECTION INTRAVENOUS AS NEEDED
Status: DISCONTINUED | OUTPATIENT
Start: 2019-11-29 | End: 2019-11-29 | Stop reason: SURG

## 2019-11-29 RX ORDER — DOCUSATE SODIUM 100 MG/1
100 CAPSULE, LIQUID FILLED ORAL 2 TIMES DAILY
Status: DISCONTINUED | OUTPATIENT
Start: 2019-11-29 | End: 2019-12-01 | Stop reason: HOSPADM

## 2019-11-29 RX ORDER — ONDANSETRON 2 MG/ML
4 INJECTION INTRAMUSCULAR; INTRAVENOUS ONCE AS NEEDED
Status: CANCELLED | OUTPATIENT
Start: 2019-11-29

## 2019-11-29 RX ORDER — IBUPROFEN 600 MG/1
600 TABLET ORAL EVERY 6 HOURS PRN
Status: DISCONTINUED | OUTPATIENT
Start: 2019-11-29 | End: 2019-12-01 | Stop reason: HOSPADM

## 2019-11-29 RX ORDER — ACETAMINOPHEN 325 MG/1
650 TABLET ORAL EVERY 4 HOURS PRN
Status: DISCONTINUED | OUTPATIENT
Start: 2019-11-29 | End: 2019-12-01 | Stop reason: HOSPADM

## 2019-11-29 RX ORDER — METOCLOPRAMIDE HYDROCHLORIDE 5 MG/ML
5 INJECTION INTRAMUSCULAR; INTRAVENOUS EVERY 6 HOURS PRN
Status: ACTIVE | OUTPATIENT
Start: 2019-11-29 | End: 2019-11-30

## 2019-11-29 RX ORDER — DIPHENHYDRAMINE HYDROCHLORIDE 50 MG/ML
25 INJECTION INTRAMUSCULAR; INTRAVENOUS EVERY 6 HOURS PRN
Status: ACTIVE | OUTPATIENT
Start: 2019-11-29 | End: 2019-11-30

## 2019-11-29 RX ORDER — KETOROLAC TROMETHAMINE 30 MG/ML
INJECTION, SOLUTION INTRAMUSCULAR; INTRAVENOUS AS NEEDED
Status: DISCONTINUED | OUTPATIENT
Start: 2019-11-29 | End: 2019-11-29 | Stop reason: SURG

## 2019-11-29 RX ORDER — ACETAMINOPHEN 325 MG/1
650 TABLET ORAL EVERY 6 HOURS
Status: DISPENSED | OUTPATIENT
Start: 2019-11-29 | End: 2019-11-30

## 2019-11-29 RX ORDER — ONDANSETRON 2 MG/ML
INJECTION INTRAMUSCULAR; INTRAVENOUS AS NEEDED
Status: DISCONTINUED | OUTPATIENT
Start: 2019-11-29 | End: 2019-11-29 | Stop reason: SURG

## 2019-11-29 RX ORDER — HYDROMORPHONE HCL/PF 1 MG/ML
0.5 SYRINGE (ML) INJECTION EVERY 2 HOUR PRN
Status: DISCONTINUED | OUTPATIENT
Start: 2019-11-29 | End: 2019-12-01 | Stop reason: HOSPADM

## 2019-11-29 RX ORDER — MIDAZOLAM HYDROCHLORIDE 2 MG/2ML
INJECTION, SOLUTION INTRAMUSCULAR; INTRAVENOUS AS NEEDED
Status: DISCONTINUED | OUTPATIENT
Start: 2019-11-29 | End: 2019-11-29 | Stop reason: SURG

## 2019-11-29 RX ORDER — MAGNESIUM HYDROXIDE/ALUMINUM HYDROXICE/SIMETHICONE 120; 1200; 1200 MG/30ML; MG/30ML; MG/30ML
15 SUSPENSION ORAL EVERY 6 HOURS PRN
Status: DISCONTINUED | OUTPATIENT
Start: 2019-11-29 | End: 2019-12-01 | Stop reason: HOSPADM

## 2019-11-29 RX ORDER — PROPOFOL 10 MG/ML
INJECTION, EMULSION INTRAVENOUS AS NEEDED
Status: DISCONTINUED | OUTPATIENT
Start: 2019-11-29 | End: 2019-11-29 | Stop reason: SURG

## 2019-11-29 RX ORDER — NALOXONE HYDROCHLORIDE 0.4 MG/ML
0.1 INJECTION, SOLUTION INTRAMUSCULAR; INTRAVENOUS; SUBCUTANEOUS
Status: ACTIVE | OUTPATIENT
Start: 2019-11-29 | End: 2019-11-30

## 2019-11-29 RX ORDER — ONDANSETRON 2 MG/ML
4 INJECTION INTRAMUSCULAR; INTRAVENOUS EVERY 4 HOURS PRN
Status: ACTIVE | OUTPATIENT
Start: 2019-11-29 | End: 2019-11-30

## 2019-11-29 RX ORDER — MORPHINE SULFATE 0.5 MG/ML
INJECTION, SOLUTION EPIDURAL; INTRATHECAL; INTRAVENOUS AS NEEDED
Status: DISCONTINUED | OUTPATIENT
Start: 2019-11-29 | End: 2019-11-29 | Stop reason: SURG

## 2019-11-29 RX ORDER — SODIUM CHLORIDE, SODIUM LACTATE, POTASSIUM CHLORIDE, CALCIUM CHLORIDE 600; 310; 30; 20 MG/100ML; MG/100ML; MG/100ML; MG/100ML
125 INJECTION, SOLUTION INTRAVENOUS CONTINUOUS
Status: DISCONTINUED | OUTPATIENT
Start: 2019-11-29 | End: 2019-12-01

## 2019-11-29 RX ORDER — OXYTOCIN/RINGER'S LACTATE 30/500 ML
62.5 PLASTIC BAG, INJECTION (ML) INTRAVENOUS ONCE
Status: COMPLETED | OUTPATIENT
Start: 2019-11-29 | End: 2019-11-30

## 2019-11-29 RX ORDER — PREDNISONE 20 MG/1
40 TABLET ORAL DAILY
Status: DISCONTINUED | OUTPATIENT
Start: 2019-12-01 | End: 2019-12-01

## 2019-11-29 RX ORDER — ONDANSETRON 2 MG/ML
4 INJECTION INTRAMUSCULAR; INTRAVENOUS EVERY 8 HOURS PRN
Status: DISCONTINUED | OUTPATIENT
Start: 2019-11-29 | End: 2019-12-01 | Stop reason: HOSPADM

## 2019-11-29 RX ORDER — DEXAMETHASONE SODIUM PHOSPHATE 10 MG/ML
INJECTION, SOLUTION INTRAMUSCULAR; INTRAVENOUS AS NEEDED
Status: DISCONTINUED | OUTPATIENT
Start: 2019-11-29 | End: 2019-11-29 | Stop reason: SURG

## 2019-11-29 RX ORDER — PROPOFOL 10 MG/ML
INJECTION, EMULSION INTRAVENOUS CONTINUOUS PRN
Status: DISCONTINUED | OUTPATIENT
Start: 2019-11-29 | End: 2019-11-29 | Stop reason: SURG

## 2019-11-29 RX ORDER — BUPIVACAINE HYDROCHLORIDE 7.5 MG/ML
INJECTION, SOLUTION INTRASPINAL AS NEEDED
Status: DISCONTINUED | OUTPATIENT
Start: 2019-11-29 | End: 2019-11-29 | Stop reason: SURG

## 2019-11-29 RX ORDER — METHYLPREDNISOLONE SODIUM SUCCINATE 40 MG/ML
40 INJECTION, POWDER, LYOPHILIZED, FOR SOLUTION INTRAMUSCULAR; INTRAVENOUS DAILY
Status: DISCONTINUED | OUTPATIENT
Start: 2019-12-02 | End: 2019-11-29

## 2019-11-29 RX ORDER — FENTANYL CITRATE/PF 50 MCG/ML
25 SYRINGE (ML) INJECTION
Status: CANCELLED | OUTPATIENT
Start: 2019-11-29

## 2019-11-29 RX ADMIN — ACETAMINOPHEN 650 MG: 325 TABLET ORAL at 16:05

## 2019-11-29 RX ADMIN — HYDROMORPHONE HYDROCHLORIDE 0.5 MG: 1 INJECTION, SOLUTION INTRAMUSCULAR; INTRAVENOUS; SUBCUTANEOUS at 22:15

## 2019-11-29 RX ADMIN — PROPOFOL 50 MG: 10 INJECTION, EMULSION INTRAVENOUS at 12:06

## 2019-11-29 RX ADMIN — MORPHINE SULFATE 2.8 MG: 0.5 INJECTION, SOLUTION EPIDURAL; INTRATHECAL; INTRAVENOUS at 12:02

## 2019-11-29 RX ADMIN — KETOROLAC TROMETHAMINE 30 MG: 30 INJECTION, SOLUTION INTRAMUSCULAR at 12:08

## 2019-11-29 RX ADMIN — SODIUM CHLORIDE, SODIUM LACTATE, POTASSIUM CHLORIDE, AND CALCIUM CHLORIDE 125 ML/HR: .6; .31; .03; .02 INJECTION, SOLUTION INTRAVENOUS at 14:44

## 2019-11-29 RX ADMIN — PROPOFOL 50 MG: 10 INJECTION, EMULSION INTRAVENOUS at 11:55

## 2019-11-29 RX ADMIN — MIDAZOLAM 2 MG: 1 INJECTION INTRAMUSCULAR; INTRAVENOUS at 11:55

## 2019-11-29 RX ADMIN — SODIUM CHLORIDE, SODIUM LACTATE, POTASSIUM CHLORIDE, AND CALCIUM CHLORIDE 1000 ML: .6; .31; .03; .02 INJECTION, SOLUTION INTRAVENOUS at 08:48

## 2019-11-29 RX ADMIN — PROPOFOL 40 MCG/KG/MIN: 10 INJECTION, EMULSION INTRAVENOUS at 12:06

## 2019-11-29 RX ADMIN — BUPIVACAINE HYDROCHLORIDE IN DEXTROSE 1.6 ML: 7.5 INJECTION, SOLUTION SUBARACHNOID at 11:18

## 2019-11-29 RX ADMIN — Medication 250 MILLI-UNITS/MIN: at 11:42

## 2019-11-29 RX ADMIN — EPHEDRINE SULFATE 10 MG: 50 INJECTION, SOLUTION INTRAVENOUS at 11:25

## 2019-11-29 RX ADMIN — DOCUSATE SODIUM 100 MG: 100 CAPSULE, LIQUID FILLED ORAL at 18:12

## 2019-11-29 RX ADMIN — SODIUM CHLORIDE, SODIUM LACTATE, POTASSIUM CHLORIDE, AND CALCIUM CHLORIDE 125 ML/HR: .6; .31; .03; .02 INJECTION, SOLUTION INTRAVENOUS at 16:07

## 2019-11-29 RX ADMIN — MORPHINE SULFATE 0.2 MG: 0.5 INJECTION, SOLUTION EPIDURAL; INTRATHECAL; INTRAVENOUS at 11:18

## 2019-11-29 RX ADMIN — PROPOFOL 100 MG: 10 INJECTION, EMULSION INTRAVENOUS at 12:02

## 2019-11-29 RX ADMIN — MIDAZOLAM 2 MG: 1 INJECTION INTRAMUSCULAR; INTRAVENOUS at 12:04

## 2019-11-29 RX ADMIN — ONDANSETRON 4 MG: 2 INJECTION INTRAMUSCULAR; INTRAVENOUS at 11:24

## 2019-11-29 RX ADMIN — KETOROLAC TROMETHAMINE 30 MG: 30 INJECTION, SOLUTION INTRAMUSCULAR at 18:12

## 2019-11-29 RX ADMIN — CEFAZOLIN SODIUM 2000 MG: 2 SOLUTION INTRAVENOUS at 10:55

## 2019-11-29 RX ADMIN — PHENYLEPHRINE HYDROCHLORIDE 50 MCG/MIN: 10 INJECTION INTRAVENOUS at 11:19

## 2019-11-29 RX ADMIN — HYDROCORTISONE SODIUM SUCCINATE 100 MG: 100 INJECTION, POWDER, FOR SOLUTION INTRAMUSCULAR; INTRAVENOUS at 18:12

## 2019-11-29 RX ADMIN — HYDROCORTISONE SODIUM SUCCINATE 100 MG: 100 INJECTION, POWDER, FOR SOLUTION INTRAMUSCULAR; INTRAVENOUS at 11:24

## 2019-11-29 RX ADMIN — ACETAMINOPHEN 650 MG: 325 TABLET ORAL at 22:15

## 2019-11-29 RX ADMIN — MORPHINE SULFATE 2 MG: 0.5 INJECTION, SOLUTION EPIDURAL; INTRATHECAL; INTRAVENOUS at 11:55

## 2019-11-29 RX ADMIN — DEXAMETHASONE SODIUM PHOSPHATE 10 MG: 10 INJECTION, SOLUTION INTRAMUSCULAR; INTRAVENOUS at 11:24

## 2019-11-29 RX ADMIN — SODIUM CHLORIDE, SODIUM LACTATE, POTASSIUM CHLORIDE, AND CALCIUM CHLORIDE: .6; .31; .03; .02 INJECTION, SOLUTION INTRAVENOUS at 11:49

## 2019-11-29 RX ADMIN — SODIUM CHLORIDE, SODIUM LACTATE, POTASSIUM CHLORIDE, AND CALCIUM CHLORIDE 125 ML/HR: .6; .31; .03; .02 INJECTION, SOLUTION INTRAVENOUS at 09:29

## 2019-11-29 RX ADMIN — Medication 62.5 MILLI-UNITS/MIN: at 14:43

## 2019-11-29 RX ADMIN — PROPOFOL 30 MG: 10 INJECTION, EMULSION INTRAVENOUS at 12:17

## 2019-11-29 NOTE — ANESTHESIA POSTPROCEDURE EVALUATION
Post-Op Assessment Note    CV Status:  Stable    Pain management: adequate     Mental Status:  Alert and awake   Hydration Status:  Euvolemic   PONV Controlled:  Controlled   Airway Patency:  Patent   Post Op Vitals Reviewed: Yes      Staff: CRNA           BP   123/71   Temp   97 2   Pulse  77   Resp      SpO2   98

## 2019-11-29 NOTE — PLAN OF CARE
Problem: PAIN - ADULT  Goal: Verbalizes/displays adequate comfort level or baseline comfort level  Description  Interventions:  - Encourage patient to monitor pain and request assistance  - Assess pain using appropriate pain scale  - Administer analgesics based on type and severity of pain and evaluate response  - Implement non-pharmacological measures as appropriate and evaluate response  - Consider cultural and social influences on pain and pain management  - Notify physician/advanced practitioner if interventions unsuccessful or patient reports new pain  Outcome: Progressing     Problem: INFECTION - ADULT  Goal: Absence or prevention of progression during hospitalization  Description  INTERVENTIONS:  - Assess and monitor for signs and symptoms of infection  - Monitor lab/diagnostic results  - Monitor all insertion sites, i e  indwelling lines, tubes, and drains  - Monitor endotracheal if appropriate and nasal secretions for changes in amount and color  - [place] appropriate cooling/warming therapies per order  - Administer medications as ordered  - Instruct and encourage patient and family to use good hand hygiene technique  - Identify and instruct in appropriate isolation precautions for identified infection/condition  Outcome: Progressing  Goal: Absence of fever/infection during neutropenic period  Description  INTERVENTIONS:  - Monitor WBC    Outcome: Progressing     Problem: SAFETY ADULT  Goal: Patient will remain free of falls  Description  INTERVENTIONS:  - Assess patient frequently for physical needs  -  Identify cognitive and physical deficits and behaviors that affect risk of falls    -  [place] fall precautions as indicated by assessment   - Educate patient/family on patient safety including physical limitations  - Instruct patient to call for assistance with activity based on assessment  - Modify environment to reduce risk of injury  - Consider OT/PT consult to assist with strengthening/mobility  Outcome: Progressing  Goal: Maintain or return to baseline ADL function  Description  INTERVENTIONS:  -  Assess patient's ability to carry out ADLs; assess patient's baseline for ADL function and identify physical deficits which impact ability to perform ADLs (bathing, care of mouth/teeth, toileting, grooming, dressing, etc )  - Assess/evaluate cause of self-care deficits   - Assess range of motion  - Assess patient's mobility; develop plan if impaired  - Assess patient's need for assistive devices and provide as appropriate  - Encourage maximum independence but intervene and supervise when necessary  - Involve family in performance of ADLs  - Assess for home care needs following discharge   - Consider OT consult to assist with ADL evaluation and planning for discharge  - Provide patient education as appropriate  Outcome: Progressing  Goal: Maintain or return mobility status to optimal level  Description  INTERVENTIONS:  - Assess patient's baseline mobility status (ambulation, transfers, stairs, etc )    - Identify cognitive and physical deficits and behaviors that affect mobility  - Identify mobility aids required to assist with transfers and/or ambulation (gait belt, sit-to-stand, lift, walker, cane, etc )  - [place] fall precautions as indicated by assessment  - Record patient progress and toleration of activity level on Mobility SBAR; progress patient to next Phase/Stage  - Instruct patient to call for assistance with activity based on assessment  - Consider rehabilitation consult to assist with strengthening/weightbearing, etc   Outcome: Progressing     Problem: Knowledge Deficit  Goal: Patient/family/caregiver demonstrates understanding of disease process, treatment plan, medications, and discharge instructions  Description  Complete learning assessment and assess knowledge base    Interventions:  - Provide teaching at level of understanding  - Provide teaching via preferred learning methods  Outcome: Progressing     Problem: DISCHARGE PLANNING  Goal: Discharge to home or other facility with appropriate resources  Description  INTERVENTIONS:  - Identify barriers to discharge w/patient and caregiver  - Arrange for needed discharge resources and transportation as appropriate  - Identify discharge learning needs (meds, wound care, etc )  - Arrange for interpretive services to assist at discharge as needed  - Refer to Case Management Department for coordinating discharge planning if the patient needs post-hospital services based on physician/advanced practitioner order or complex needs related to functional status, cognitive ability, or social support system  Outcome: Progressing     Problem: POSTPARTUM  Goal: Experiences normal postpartum course  Description  INTERVENTIONS:  - Monitor maternal vital signs  - Assess uterine involution and lochia  Outcome: Progressing  Goal: Appropriate maternal -  bonding  Description  INTERVENTIONS:  - Identify family support  - Assess for appropriate maternal/infant bonding   -Encourage maternal/infant bonding opportunities  - Referral to  or  as needed  Outcome: Progressing  Goal: Establishment of infant feeding pattern  Description  INTERVENTIONS:  - Assess breast/bottle feeding  - Refer to lactation as needed  Outcome: Progressing  Goal: Incision(s), wounds(s) or drain site(s) healing without S/S of infection  Description  INTERVENTIONS  - Assess and document risk factors for skin impairment   - Assess and document dressing, incision, wound bed, drain sites and surrounding tissue  - Consider nutrition services referral as needed  - Oral mucous membranes remain intact  - Provide patient/ family education  Outcome: Progressing

## 2019-11-29 NOTE — PLAN OF CARE
Problem: BIRTH - VAGINAL/ SECTION  Goal: Fetal and maternal status remain reassuring during the birth process  Description  INTERVENTIONS:  - Monitor vital signs  - Monitor fetal heart rate  - Monitor uterine activity  - Monitor labor progression (vaginal delivery)  - DVT prophylaxis  - Antibiotic prophylaxis  Outcome: Completed  Goal: Emotionally satisfying birthing experience for mother/fetus  Description  Interventions:  - Assess, plan, implement and evaluate the nursing care given to the patient in labor  - Advocate the philosophy that each childbirth experience is a unique experience and support the family's chosen level of involvement and control during the labor process   - Actively participate in both the patient's and family's teaching of the birth process  - Consider cultural, Judaism and age-specific factors and plan care for the patient in labor  Outcome: Completed

## 2019-11-29 NOTE — DISCHARGE SUMMARY
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  3  Obesity: BMI 41    Discharge Diagnosis:   Same, delivered    Procedures: repeat  section, low transverse incision    Delivery Attending: Cinthia Proctor MD  Discharge Attending: Dr Elsy Madera Course:     Svetlana Murillo is a 21 y o  [place] at 37w6d wks who was initially admitted for scheduled repeat  section  Pregnancy was complicated by alloimmune thrombocytopenia and was treated by steroids and IVIG  She delivered a viable male  on 19 at [phone]  Weight 7lbs 9 5oz via repeat  section, low transverse incision  Apgars were 9 (1 min) and 9 (5 min)   was transferred to  nursery  Patient tolerated the procedure well and was transferred to recovery in stable condition  Her post-operative course was complicated by spinal headache treated with Fiorcet  Preoperative hemoglobin was 12 1, postoperative was 9 2  Her postoperative pain was well controlled with oral analgesics  On day of discharge, she was ambulating and able to reasonably perform all ADLs  She was voiding and had appropriate bowel function  Pain was well controlled  She was discharged home on post-operative day #2 without complications  Patient was instructed to follow up with her OB as an outpatient and was given appropriate warnings to call provider if she develops signs of infection or uncontrolled pain  Complications: none apparent    Condition at discharge: good     Discharge instructions/Information to patient and family:   See after visit summary for information provided to patient and family        Provisions for Follow-Up Care:  See after visit summary for information related to follow-up care and any pertinent home health orders  Disposition: Home    Planned Readmission: No    Discharge Medications: For a complete list of the patient's medications, please refer to her med rec      Luiz Rider MD  12/01/19 no fever and no chills.

## 2019-11-29 NOTE — ANESTHESIA PREPROCEDURE EVALUATION
Review of Systems/Medical History  Patient summary reviewed  Chart reviewed  No history of anesthetic complications     Cardiovascular  Negative cardio ROS Exercise tolerance (METS): >4,  No angina , No RAPHAEL,    Pulmonary  Smoker cigarette smoker  , No asthma , No shortness of breath, No recent URI ,        GI/Hepatic  Negative GI/hepatic ROS          Negative  ROS        Endo/Other    Obesity (BMI 41)  morbid obesity   GYN  Currently pregnant , Prior pregnancy/OB history : 3 Parity: 1,     Comment: H/o previous c/s     Hematology  Negative hematology ROS      Musculoskeletal  Negative musculoskeletal ROS        Neurology  Negative neurology ROS      Psychology   Negative psychology ROS              Physical Exam    Airway    Mallampati score: II  TM Distance: >3 FB  Neck ROM: full     Dental   No notable dental hx     Cardiovascular  Comment: Negative ROS,     Pulmonary      Other Findings        Anesthesia Plan  ASA Score- 3     Anesthesia Type- spinal with ASA Monitors  Additional Monitors:   Airway Plan:         Plan Factors-    Induction-     Postoperative Plan-     Informed Consent- Anesthetic plan and risks discussed with patient  I personally reviewed this patient with the CRNA  Discussed and agreed on the Anesthesia Plan with the CRNA  Sabi Finch

## 2019-11-29 NOTE — PROGRESS NOTES
Post-Op Check  23y/o s/p repeat  section  She reports that she is doing well  No complaints at this time   UOP: 55cc/hr over the last five hours    Vitals:    19 1729   BP: 139/74   Pulse: 80   Resp: 20   Temp: 98 °F (36 7 °C)   SpO2: 97%     General: No acute distress  CV: RRR, no murmurs or rubs  Lungs: CTAB, no wheezes or rales  Abdomen: Incision C/D/I    A/P: 500cc bolus for low UOP at this time; continue to monitor closely   Continue routine post-op care   [place] out in AM if UOP adequate       Brook Delatorre MD, PGY-2  2019  6:08 PM

## 2019-11-29 NOTE — DISCHARGE INSTRUCTIONS
Self Care After Delivery   AMBULATORY CARE:   The postpartum period  is the period of time from delivery to about 6 weeks  During this time you may experience many physical and emotional changes  It is important to understand what is normal and when you need to call your healthcare provider  It is also important to know how to care for yourself during this time  Call 911 for any of the following:   · You soak through 1 pad in 15 minutes, have blurry vision, clammy or pale skin, and feel faint  · You faint or lose consciousness  · Your heart is beating faster than normal      · You have trouble breathing  · You cough up blood  Seek care immediately if:   · You soak through 1 or more pads in an hour, or pass blood clots larger than a quarter from your vagina  · Your leg is painful, red, and larger than normal      · You have severe abdominal pain  · You have a bad headache or changes in your vision  · Your episiotomy or C section incision is red, swollen, bleeding, or draining pus  · Your incision comes apart  · You see or hear things that are not there, or have thoughts of harming yourself or your baby  Contact your obstetrician or midwife if:   · You have a fever  · You have new or worsening pain in your abdomen or vagina  · You continue to have the baby blues 10 days after you deliver  · You have trouble sleeping  · You have foul-smelling discharge from your vagina  · You have pain or burning when you urinate  · You do not have a bowel movement for 3 days or more  · You have nausea or are vomiting  · You have hard lumps or red streaks over your breasts  · You have cracked nipples or bleed from your nipples  · You have questions or concerns about your condition or care  Physical changes:   The following are normal changes after you give birth:  · Pain in the area between your anus and vagina    · Breast pain    · Constipation or hemorrhoids    · Hot or cold flashes    · Vaginal bleeding or discharge    · Mild to moderate abdominal cramping    · Difficulty controlling bowel movements or urine  Emotional changes: The following are symptoms of the baby blues, or normal emotions after you give birth  The changes in your emotions may be caused by a drop in hormone levels after you deliver  If these symptoms last longer than 1 to 2 weeks after you give birth, you may have postpartum depression  · Feeling irritable    · Feeling sad    · Crying for no reason    · Feeling anxious  Breast care for nursing mothers: You may have sore breasts for 3 to 6 days after you give birth  This happens as your milk begins to fill your breasts  You may also have sore breasts if you do not breastfeed frequently  Do the following to care for your breasts:  · Apply a moist, warm, compress to your breast as directed  This may help soothe your breasts  Make sure the washcloth is not too hot before you apply it to your breast      · Nurse your baby or pump your milk frequently  This may prevent clogged milk ducts  Ask your healthcare provider how often to nurse or pump  · Massage your breasts as directed  This may help increase your milk flow  Gently rub your breasts in a circular motion before you breastfeed  You may need to gently squeeze your breast or nipple to help release milk  You can also use a breast pump to help release milk from your breast      · Wash your breasts with warm water only  Do not put soap on your nipples  Soap may cause your nipples to become dry  · Apply lanolin cream to your nipples as directed  Lanolin cream may add moisture to your skin and prevent nipple dryness  Always  wash off lanolin cream with warm water before you breastfeed  · Place pads in your bra  Your nipples may leak milk when you are not breastfeeding  You can place pads inside of your bra to help prevent leaking onto your clothing   Ask your healthcare provider where to purchase bra pads  · Get breastfeeding support if needed  There are healthcare providers who can answer questions about breastfeeding and provide you with support  Ask your healthcare provider who you can contact if you need breastfeeding support  Breast care for non-nursing mothers:  Milk will fill your breasts even if you bottle feed your baby  Do the following to help stop your milk from filling your breasts and causing pain:  · Wear a bra with support at all times  A sports bra or a tight-fitting bra will help stop your milk from coming in  · Apply ice on each breast for 15 to 20 minutes every hour or as directed  Use an ice pack, or put crushed ice in a plastic bag  Cover it with a towel  Ice helps your milk ducts shrink  · Keep your breasts away from warm water  Warm water will make it easier for milk to fill your breasts  Stand with your breasts away from warm water in the shower  · Limit how much you touch your breasts  This will prevent them from filling with milk  Perineum care: Your perineum is the area between your rectum and vagina  It is normal to have swelling and pain in this area after you give birth  If you had an episiotomy, your healthcare provider may give you special instructions  · Clean your perineum after you use the bathroom  This may prevent infection and help with healing  Use a spray bottle with warm water to clean your perineum  You may also gently spray warm water against your perineum when you urinate  Always wipe front to back  · Take a sitz bath as directed  A sitz bath may help relieve swelling and pain  Fill your bath tub or bucket with water up to your hips and sit in the water  Use cold water for 2 days after you deliver  Then use warm water  Ask your healthcare provider for more information about a sitz bath  · Apply ice packs for the first 24 hours or as directed  Use a plastic glove filled with ice or buy an ice pack   Wrap the ice pack or plastic glove in a small towel or wash cloth  Place the ice pack on your perineum for 20 minutes at a time  · Sit on a donut-shaped pillow  This may relieve pressure on your perineum when you sit  · Use wipes with medicine or take pills as directed  Your healthcare provider may tell you to use witch hazel pads  You can place witch hazel pads in the refrigerator before you apply them to your perineum  He may also tell you to take NSAIDs  Ask your healthcare provider how often to take pills or use wipes with medicine  · Do not go swimming or take tub baths for 4 to 6 weeks or as directed  This will help prevent an infection in your vagina or uterus  Bowel and bladder care: It may take 3 to 5 days to have a bowel movement after you deliver your baby  You can do the following to prevent or manage constipation, and get control of your bowel or bladder:  · Take stool softeners as directed  A stool softener is medicine that will make your bowel movements softer  This may prevent or relieve constipation  A stool softener may also make bowel movements less painful  · Drink plenty of liquids  Ask how much liquid to drink each day and which liquids are best for you  Liquids may help prevent constipation  · Eat foods high in fiber  Examples include fruits, vegetables, grains, beans, and lentils  Ask your healthcare provider how much fiber you need each day  Fiber may prevent constipation  · Do Kegel exercises as directed  Kegel exercises will help strengthen the muscles that control bowel movements and urination  Ask your healthcare provider for more information on Kegel exercises  · Apply cold compresses or medicine to hemorrhoids as directed  This may relieve swelling and pain  Your healthcare provider may tell you to apply ice or wipes with medicine to your hemorrhoids  He may also tell you to use a sitz bath   Ask your healthcare for more information on how to manage hemorrhoids  Nutrition:  Good nutrition is important in the postpartum period  It will help you return to a healthy weight, increase your energy levels, and prevent constipation  It will also help you get enough nutrients and calories if you are going to breastfeed your baby  · Eat a variety of healthy foods  Healthy foods include fruits, vegetables, whole-grain breads, low-fat dairy products, beans, lean meats, and fish  You may need 500 to 700 additional calories each day if you breastfeed your baby  You may also need extra protein  · Limit foods with added sugar and high amounts of fat  These foods are high in calories and low in healthy nutrients  Read food labels so you know how much sugar and fat is in the food you want to eat  · Drink 8 to 10 glasses of water per day  Water will help you make plenty of milk for your baby  It will also help prevent constipation  Drink a glass of water every time you breastfeed your baby  · Take vitamins as directed  Ask your healthcare provider what vitamins you need  · Limit caffeine and alcohol if you are breastfeeding  Caffeine and alcohol can get into your breast milk  Caffeine and alcohol can make your baby fussy  They can also interfere with your baby's sleep  Ask your healthcare provider if you can drink alcohol or caffeine  Rest and sleep: You may feel very tired in the postpartum period  Enough sleep will help you heal and give you energy to care for your baby  The following may help you get sleep and rest:  · Nap when your baby naps  Your baby may nap several times during the day  Get rest during this time  · Limit visitors  Many people may want to see you and your baby  Ask friends or family to visit on different days  This will give you time to rest      · Do not plan too much for one day  Put off household chores so that you have time to rest  Gradually do more each day  · Ask for help from family, friends, or neighbors    Ask them to help you with laundry, cleaning, or errands  Also ask someone to watch the baby while you take a nap or relax  Ask your partner to help with the care of your baby  Pump some of your breast milk so your partner can feed your baby during the night  Exercise after delivery:  Wait until your healthcare provider says it is okay to exercise  Exercise can help you lose weight, increase your energy levels, and manage your mood  It can also prevent constipation and blood clots  Start with gentle exercises such as walking  Do more as you have more energy  You may need to avoid abdominal exercises for 1 to 2 weeks after you deliver  Talk to your healthcare provider about an exercise plan that is right for you  Sexual activity after delivery:   · Do not have sex until your healthcare provider says it is okay  You may need to wait 4 to 6 weeks before you have sex  This may prevent infection and allow time to heal      · Your menstrual cycle may begin as soon as 3 weeks after you deliver  Your period may be delayed if you breastfeed your baby  You can become pregnant before you get your first postpartum period  Talk to your healthcare provider about birth control that is right for you  Some types of birth control are not safe during breastfeeding  For support and more information:  Join a support group for new mothers  Ask for help from family and friends with chores, errands, and care of your baby  · Office of Women's Health, [place] Department of Health and Human Services  [address], [address] [place]  [place] , [address]  [address], [address] [place]  [place] , [address]  Phone: 9- [phone]  Web Address: www womenshealth gov  · [facility] Postpartum [address] [facility] , [address] [place] [address]  [address]  Web Address: [employer] be  org/pregnancy/postpartum-care  aspx  Follow up with your obstetrician or midwife as directed:   You will need to follow up with your healthcare provider in 2 to 6 weeks after delivery  Write down your questions so you remember to ask them at your visits  © 2017 [address] [employer] Information is for End User's use only and may not be sold, redistributed or otherwise used for commercial purposes  All illustrations and images included in CareNotes® are the copyrighted property of A D A M , Inc  or [employer]  The above information is an  only  It is not intended as medical advice for individual conditions or treatments  Talk to your doctor, nurse or pharmacist before following any medical regimen to see if it is safe and effective for you

## 2019-11-29 NOTE — OP NOTE
OPERATIVE REPORT  PATIENT NAME: Ana Burton    :  1995  MRN: [id number]  Pt Location: [facility] OR ROOM 02    SURGERY DATE: 2019    Surgeon(s) and Role:     * Amy Lopez MD - Primary    Preop Diagnosis:  Previous  section complicating pregnancy, with delivery [O34 219]    Post-Op Diagnosis Codes:     * Previous  section complicating pregnancy, with delivery [O34 219]    Procedure(s) (LRB):   SECTION () REPEAT (N/A)    Specimen(s):  ID Type Source Tests Collected by Time Destination   1 : placenta for exam Tissue (Placenta on Hold) OB Only Placenta TISSUE EXAM OB (PLACENTA) Mercy Ansari MD 2019 1145    A : cord gases Cord Blood Cord BLOOD GAS, VENOUS, CORD, BLOOD GAS, ARTERIAL, CORD (Canceled) Amy Lopez MD 2019 1143        Estimated Blood Loss:   Minimal    Drains:  Urethral Catheter Non-latex 16 Fr  (Active)   Reasons to continue Urinary Catheter  Post-operative urological requirements 2019 12:45 PM   Goal for Removal Remove POD#1 2019 12:45 PM   Site Assessment Clean;Skin intact 2019 12:45 PM   Collection Container Standard drainage bag 2019 12:45 PM   Securement Method Securing device (Describe) 2019 11:24 AM   Number of days: 0       Anesthesia Type:   Spinal    Operative Indications:  Previous  section complicating pregnancy, with delivery [O34 219]  Prior Infant with NAIT    Operative Findings:    Findings:  Viable male weighing 7lbs 9 5oz;  Apgar scores of 9 at one minute and 9 at five minutes  clear amniotic fluid  Normal uterus, tubes, and ovaries  Normal placenta with 3-vessel cord    Operative Procedure: The patient was taken to the operating room where a time out was performed to confirm correct patient and correct procedure  Spinal anesthesia was adequately established  Prior to incision 2gm Ancef was given for preoperative prophylaxis    The patient was then placed in a supine position with a left lateral tilt  Fitzpatrick catheter was placed in sterile fashion  Fetal heart tones were noted to be normal  The patient was then prepped and draped in the usual sterile fashion  Anesthesia was tested using an allis clamp and noted to be adequate  An incision was made in the skin with a surgical scalpel  Sharp and blunt dissection was used to reach the level of the rectus fascia  Bovie electrocautery was utilitzed for hemostasis during this process  The fascia was incised transversely at the midline and the fascial incision was extended bilaterally using sharp dissection  The superior edge of the fascial incision was grasped with Kocher clamps, tented up and the underlying rectus muscles were dissected off bluntly  The inferior edge of the fascial incision was then dissection off the rectus muscles with blunt and sharp dissection  The rectus muscles were then divided at the midline  The peritoneum was identified, tented up at its upper margin taking care to avoid the bladder, and then entered with blunt dissection  The peritoneal incision and rectus muscle were extended bilaterally bluntly with gentle traction  The bladder blade was inserted  Then, a transverse incision was made in the lower uterine segment using a new surgical blade  The uterine incision was extended cephalad and caudal using blunt dissection  The amniotic sac was entered bluntly and the amniotic fluid was noted to be Clear  The surgeon's hand was placed into the uterine cavity  The fetus was noted to be in cephalic presentation, and the presenting part was grasped and delivered through the uterine incision, with extension of the uterus with a T-incision and with the assistance of fundal pressure  The rectus muscle was also cut on the left to allow for additional space to deliver the fetus in an atraumatic way  No nuchal cord was identified  The infant's oral and nasal passages were bulb suctioned    After delivery the cord was doubly clamped and cut  The infant was then passed off the table to the awaiting  staff  Venous and arterial blood gas, cord blood, and portion of cord was obtained for analysis and routine blood testing  The placenta delivery was then spontaneous  Placenta was noted to be intact with a centrally inserted three-vessel cord  Oxytocin was administered by IV infusion to enhance uterine contraction  The uterus was exteriorized and cleared of all clots and debris by blunt curretage with a moist lap sponge  The uterine incision was reapproximated using a 0-monocryl in a running locked fashion  A second imbricating stitch with 0- monocryl was placed  The T-incision was closed with an additional layer of 0-vicryl  The uterine incision was examined and noted to be hemostatic  The posterior cul-de-sac was cleared of all clots  The uterus was replaced into the abdomen and the pericolic gutters were cleared of all clots  The uterine incision was once again reexamined and noted to be hemostatic  The fascia was re-approximated using 0 Vicryl in a running nonlocked fashion  The subcutaneous tissue was irrigated and cleared of all clots and debris  Good hemostasis was noted  The skin incision was closed with 4-0 Vicryl  Good hemostasis was noted  Exofin was placed on top of the incision  All needle, sponge, and instrument counts were noted to be correct x 2 at the end of the procedure  The patient was then cleansed and transferred to the recovery room  Overall, the patient tolerated the procedure well and is currently in stable condition in the PACU with her   I was present and participated in the entire procedure       Complications:   None     I was present for the entire procedure    Patient Disposition:  PACU     SIGNATURE: Amy Lopez MD  DATE: 2019  TIME: 2:19 PM

## 2019-11-29 NOTE — PLAN OF CARE

## 2019-11-29 NOTE — ANESTHESIA PROCEDURE NOTES
CSE Block    Patient location during procedure: OB  Start time: 11/29/2019 11:19 AM  Staffing  Anesthesiologist: Sukhjinder Chavez DO  Performed: anesthesiologist   Preanesthetic Checklist  Completed: patient identified, site marked, surgical consent, pre-op evaluation, timeout performed, IV checked, risks and benefits discussed and monitors and equipment checked  CSE  Patient position: right lateral decubitus  Prep: ChloraPrep  Patient monitoring: heart rate, cardiac monitor, continuous pulse ox and frequent blood pressure checks  Approach: midline  Spinal Needle  Needle type: Tuohy   Needle length: 10 cm  Epidural Needle  Injection technique: RIO saline  Needle type: Tuohy   Needle gauge: 18 G  Location: lumbar (1-5)  Assessment  Sensory level: T4  Injection Assessment:  positive aspiration for clear CSF  Additional Notes  Difficult spinal with multiple failed attempts at two levels by multiple providers  Attempted in RLD CSE to localize epidural space with inadvertent dural puncture with Tuohy needle  No significant change in tissue texture with RIO to saline attempt and CSF obtained at 9cm depth  Spinal dose given via Tuohy needle  Discussed with team need to monitor for development of PDPH      Sultana Gonzales

## 2019-11-29 NOTE — H&P
H&P Exam - Obstetrics   Amberly Vera 21 y o  female MRN: [id number]  Unit/Bed#:  Encounter: [id number]      History of Present Illness     Chief Complaint: Scheduled repeat  section    HPI:  Amberly Vera is a 21 y o   female with an ALBA of 2019, by Last Menstrual Period at 37w6d weeks gestation who is being admitted for scheduled repeat  section  Contractions: no  Loss of fluid: no  Vaginal bleeding: no  Fetal movement: present    She is SLOGA patient  PREGNANCY COMPLICATIONS:   1) Alloimmune thrombocytopenia during pregnancy: Prednisone, IVIG treatment during pregnancy  2) Obesity: BMI 41    OB History    Para Term  AB Living   3 1 1 0 1 1   SAB TAB Ectopic Multiple Live Births   0 0 0 0 1      # Outcome Date GA Lbr Micah/2nd Weight Sex Delivery Anes PTL Lv   3 Current            2 AB 2018              Birth Comments: [facility]   1 Term 04/26/15 37w0d  3289 g (7 lb 4 oz) M  Spinal N NICK      Birth Comments: FOB #1      Complications: Fetal intolerance to labor, delivered, current hospitalization, PROM (premature rupture of membranes)       Baby complications/comments: None    Review of Systems   Constitutional: Negative for diaphoresis and fever  Respiratory: Negative for apnea, shortness of breath and wheezing  Cardiovascular: Negative for chest pain and palpitations  Gastrointestinal: Negative for blood in stool and vomiting  Genitourinary: Negative for dysuria and hematuria  Neurological: Negative for dizziness and numbness         Historical Information   Past Medical History:   Diagnosis Date    Encounter for supervision of normal pregnancy in multigravida in third trimester 10/29/2019     alloimmune thrombocytopenia     Obesity complicating pregnancy in second trimester 2019    Varicella      Past Surgical History:   Procedure Laterality Date    ABDOMINAL SURGERY       2 years ago     SECTION      CHOLECYSTECTOMY      2017    CHOLECYSTECTOMY LAPAROSCOPIC N/A 8/30/2017    Procedure: CHOLECYSTECTOMY LAPAROSCOPIC;  Surgeon: Tayler Ann MD;  Location: [facility];  Service: General     Social History   Social History     Substance and Sexual Activity   Alcohol Use No    Frequency: Never     Social History     Substance and Sexual Activity   Drug Use No     Social History     Tobacco Use   Smoking Status Current Some Day Smoker    Packs/day: 0 50   Smokeless Tobacco Never Used   Tobacco Comment    1-2     Family History: non-contributory    Meds/Allergies      No medications prior to admission  Allergies   Allergen Reactions    Bee Venom Anaphylaxis     Is suppose to carry EpiPen    Tobramycin      Pt states she is not sure if she is allergic to this        OBJECTIVE:    Vitals: Last menstrual period 03/09/2019, not currently breastfeeding  There is no height or weight on file to calculate BMI  Physical Exam   Constitutional: She appears well-developed  Cardiovascular: Normal rate and regular rhythm  Pulmonary/Chest: No respiratory distress  Abdominal: Soft  Skin: Skin is warm and dry  Psychiatric: She has a normal mood and affect  Nursing note and vitals reviewed          [place]: Deferred  Nitrazine: Deferred    Cervix: Deferred       Fetal heart rate:  Cat 1       [place]: Quiet       GBS: Negative    Prenatal Labs:   Blood Type:   Lab Results   Component Value Date/Time    ABO Grouping O 11/29/2019 08:48 AM    ABO Grouping O 04/25/2015 12:17 PM     , D (Rh type):   Lab Results   Component Value Date/Time    Rh Factor Positive 11/29/2019 08:48 AM    Rh Factor Positive 04/25/2015 12:17 PM     , Antibody Screen:   Lab Results   Component Value Date/Time    Antibody Screen Negative 04/25/2015 12:17 PM    , Platelets:   Lab Results   Component Value Date/Time    Platelets 638 41/62/3678 08:48 AM    Platelets 500 45/59/5084 05:20 AM      , 1 hour Glucola:   Lab Results   Component Value Date/Time    GLUCOSE 1 HR 50 GM GLUC CHALLENGE-PREG  2015 08:56 AM    Glucose 95 10/01/2019 03:52 PM   , Rubella:   Lab Results   Component Value Date/Time    RUBELLA IGG QUANTITATION 48 2 2014 12:35 PM    Rubella IgG Quant 75 2 2019 10:07 AM        , VDRL/RPR:   Lab Results   Component Value Date/Time    RPR SCREEN Nonreactive 2015 12:17 PM    RPR Non-Reactive 10/01/2019 03:52 PM      , Hep B:   Lab Results   Component Value Date/Time    HEPATITIS B SURFACE ANTIGEN Non-Reactive (q 2014 12:35 PM    Hepatitis B Surface Ag Non-reactive 2019 10:07 AM     , HIV:   Lab Results   Component Value Date/Time    HIV-1/2 AB-AG Non-Reactive (q 2015 01:55 PM    HIV-1/HIV-2 Ab Non-Reactive 2019 10:07 AM     , Group B Strep:    Lab Results   Component Value Date/Time    Strep Grp B PCR Negative for Beta Hemolytic Strep Grp B by PCR 2019 01:33 PM          Invasive Devices     None                   Assessment/Plan     ASSESSMENT:  25yo  at 37w6d weeks gestation who is being admitted for scheduled repeat  section  PLAN:   - Admit   - CBC, RPR, Blood Type    - RLTCS     - Ancef pre-op for ppx   -  Stress dose steroids x 24 hours followed by steroid taper  This patient will be an INPATIENT  and I certify the anticipated length of stay is >2 Midnights      Libertad Dowd MD  2019  7:51 AM

## 2019-11-30 LAB
BASOPHILS # BLD AUTO: 0.02 THOUSANDS/ΜL (ref 0–0.1)
BASOPHILS NFR BLD AUTO: 0 % (ref 0–1)
EOSINOPHIL # BLD AUTO: 0.01 THOUSAND/ΜL (ref 0–0.61)
EOSINOPHIL NFR BLD AUTO: 0 % (ref 0–6)
ERYTHROCYTE [DISTWIDTH] IN BLOOD BY AUTOMATED COUNT: 14.1 % (ref 11.6–15.1)
HCT VFR BLD AUTO: 29 % (ref 34.8–46.1)
HGB BLD-MCNC: 9.2 G/DL (ref 11.5–15.4)
IMM GRANULOCYTES # BLD AUTO: 0.02 THOUSAND/UL (ref 0–0.2)
IMM GRANULOCYTES NFR BLD AUTO: 0 % (ref 0–2)
LYMPHOCYTES # BLD AUTO: 1.39 THOUSANDS/ΜL (ref 0.6–4.47)
LYMPHOCYTES NFR BLD AUTO: 20 % (ref 14–44)
MCH RBC QN AUTO: 28.1 PG (ref 26.8–34.3)
MCHC RBC AUTO-ENTMCNC: 31.7 G/DL (ref 31.4–37.4)
MCV RBC AUTO: 89 FL (ref 82–98)
MONOCYTES # BLD AUTO: 0.56 THOUSAND/ΜL (ref 0.17–1.22)
MONOCYTES NFR BLD AUTO: 8 % (ref 4–12)
NEUTROPHILS # BLD AUTO: 4.81 THOUSANDS/ΜL (ref 1.85–7.62)
NEUTS SEG NFR BLD AUTO: 72 % (ref 43–75)
NRBC BLD AUTO-RTO: 0 /100 WBCS
PLATELET # BLD AUTO: 189 THOUSANDS/UL (ref 149–390)
PMV BLD AUTO: 11 FL (ref 8.9–12.7)
RBC # BLD AUTO: 3.27 MILLION/UL (ref 3.81–5.12)
WBC # BLD AUTO: 6.81 THOUSAND/UL (ref 4.31–10.16)

## 2019-11-30 PROCEDURE — 85025 COMPLETE CBC W/AUTO DIFF WBC: CPT | Performed by: OBSTETRICS & GYNECOLOGY

## 2019-11-30 PROCEDURE — 99024 POSTOP FOLLOW-UP VISIT: CPT | Performed by: OBSTETRICS & GYNECOLOGY

## 2019-11-30 RX ORDER — BUTALBITAL, ACETAMINOPHEN AND CAFFEINE 50; 325; 40 MG/1; MG/1; MG/1
1 TABLET ORAL ONCE
Status: COMPLETED | OUTPATIENT
Start: 2019-11-30 | End: 2019-12-01

## 2019-11-30 RX ADMIN — ACETAMINOPHEN 650 MG: 325 TABLET ORAL at 12:49

## 2019-11-30 RX ADMIN — IBUPROFEN 600 MG: 600 TABLET ORAL at 18:43

## 2019-11-30 RX ADMIN — OXYCODONE HYDROCHLORIDE AND ACETAMINOPHEN 1 TABLET: 5; 325 TABLET ORAL at 22:34

## 2019-11-30 RX ADMIN — KETOROLAC TROMETHAMINE 30 MG: 30 INJECTION, SOLUTION INTRAMUSCULAR at 15:14

## 2019-11-30 RX ADMIN — DOCUSATE SODIUM 100 MG: 100 CAPSULE, LIQUID FILLED ORAL at 18:42

## 2019-11-30 RX ADMIN — KETOROLAC TROMETHAMINE 30 MG: 30 INJECTION, SOLUTION INTRAMUSCULAR at 02:14

## 2019-11-30 RX ADMIN — HYDROCORTISONE SODIUM SUCCINATE 100 MG: 100 INJECTION, POWDER, FOR SOLUTION INTRAMUSCULAR; INTRAVENOUS at 06:54

## 2019-11-30 RX ADMIN — SODIUM CHLORIDE, SODIUM LACTATE, POTASSIUM CHLORIDE, AND CALCIUM CHLORIDE 125 ML/HR: .6; .31; .03; .02 INJECTION, SOLUTION INTRAVENOUS at 02:14

## 2019-11-30 RX ADMIN — HYDROCORTISONE SODIUM SUCCINATE 100 MG: 100 INJECTION, POWDER, FOR SOLUTION INTRAMUSCULAR; INTRAVENOUS at 15:13

## 2019-11-30 RX ADMIN — KETOROLAC TROMETHAMINE 30 MG: 30 INJECTION, SOLUTION INTRAMUSCULAR at 08:49

## 2019-11-30 RX ADMIN — DOCUSATE SODIUM 100 MG: 100 CAPSULE, LIQUID FILLED ORAL at 08:49

## 2019-11-30 RX ADMIN — HYDROCORTISONE SODIUM SUCCINATE 100 MG: 100 INJECTION, POWDER, FOR SOLUTION INTRAMUSCULAR; INTRAVENOUS at 22:34

## 2019-11-30 NOTE — LACTATION NOTE
This note was copied from a baby's chart  Mo states infant is feeding well so far  Reviewed expected  infant feeding patterns in the first few days and encouraged feeding on cue  Given admission breastfeeding pkat and same reviewed  Encouraged to call for additional assistance as needed

## 2019-11-30 NOTE — PROGRESS NOTES
Progress Note - OB/GYN   Rossana Flank 21 y o  female MRN: [id number]  Unit/Bed#: -01 Encounter: [id number]    Assessment:  Post partum Day #1 s/p RLTCS, stable, baby in nursery    Plan:  1) Post Op Status   -QBL:987ml   -Hgb:12 1->9 2   -UOP: 0 8cc/kg/hr, alcantara removed this AM, f/u Trial of void    2) Alloimmune thrombocytopenia   -Solumedrol 100mg q8h for 48hr starting 11/29/19 @1700-> transition to prednisone 40mg PO for 5 days (starting 12/1/19 @ 2200) then taper down by 10mg every 5 days    3) Continue routine post partum care   Encourage ambulation   Encourage breastfeeding       Subjective/Objective   Chief Complaint:     Post delivery  Patient is doing well  Lochia WNL  Pain well controlled  Subjective:     Pain: yes, cramping, improved with meds  Tolerating PO: yes  Voiding: yes  Flatus: no  BM: no  Ambulating: yes  Breastfeeding:  yes  Chest pain: no  Shortness of breath: no  Leg pain: no  Lochia: minimal    Objective:     Vitals: /59 (BP Location: Right arm)   Pulse 74   Temp 98 9 °F (37 2 °C) (Oral)   Resp 18   Ht 5' 5" (1 651 m)   Wt 112 kg (248 lb)   LMP 03/09/2019 (Exact Date)   SpO2 98%   Breastfeeding? Yes   BMI 41 27 kg/m²       Intake/Output Summary (Last 24 hours) at 11/30/2019 0732  Last data filed at 11/30/2019 0600  Gross per 24 hour   Intake 3918 63 ml   Output 2212 ml   Net 1706 63 ml       Lab Results   Component Value Date    WBC 6 59 11/29/2019    HGB 12 1 11/29/2019    HCT 37 5 11/29/2019    MCV 86 11/29/2019     11/29/2019       Physical Exam:     Gen: AAOx3, NAD  CV: RRR  Lungs: CTA b/l  Abd: Soft, non-tender, non-distended, no rebound or guarding  Uterine fundus firm and non-tender, 1 cm below the umbilicus     Incision: C/D/I  Ext: Non tender    Estee Hurst MD  11/30/2019  7:32 AM

## 2019-12-01 VITALS
BODY MASS INDEX: 41.32 KG/M2 | WEIGHT: 248 LBS | RESPIRATION RATE: 18 BRPM | HEIGHT: 65 IN | OXYGEN SATURATION: 97 % | TEMPERATURE: 98.2 F | HEART RATE: 74 BPM | SYSTOLIC BLOOD PRESSURE: 145 MMHG | DIASTOLIC BLOOD PRESSURE: 81 MMHG

## 2019-12-01 PROCEDURE — 99024 POSTOP FOLLOW-UP VISIT: CPT | Performed by: OBSTETRICS & GYNECOLOGY

## 2019-12-01 RX ORDER — PREDNISONE 20 MG/1
40 TABLET ORAL DAILY
Status: DISCONTINUED | OUTPATIENT
Start: 2019-12-01 | End: 2019-12-01 | Stop reason: HOSPADM

## 2019-12-01 RX ORDER — CYCLOBENZAPRINE HCL 10 MG
10 TABLET ORAL 3 TIMES DAILY PRN
Qty: 9 TABLET | Refills: 1 | Status: SHIPPED | OUTPATIENT
Start: 2019-12-01 | End: 2020-02-03 | Stop reason: ALTCHOICE

## 2019-12-01 RX ORDER — PREDNISONE 10 MG/1
40 TABLET ORAL DAILY
Qty: 48 TABLET | Refills: 0 | Status: SHIPPED | OUTPATIENT
Start: 2019-12-01 | End: 2019-12-11 | Stop reason: HOSPADM

## 2019-12-01 RX ORDER — ACETAMINOPHEN 325 MG/1
650 TABLET ORAL EVERY 4 HOURS PRN
Qty: 30 TABLET | Refills: 0
Start: 2019-12-01

## 2019-12-01 RX ORDER — IBUPROFEN 600 MG/1
600 TABLET ORAL EVERY 6 HOURS PRN
Qty: 30 TABLET | Refills: 0
Start: 2019-12-01 | End: 2019-12-11 | Stop reason: HOSPADM

## 2019-12-01 RX ORDER — CYCLOBENZAPRINE HCL 10 MG
10 TABLET ORAL 3 TIMES DAILY PRN
Status: DISCONTINUED | OUTPATIENT
Start: 2019-12-01 | End: 2019-12-01 | Stop reason: HOSPADM

## 2019-12-01 RX ORDER — BUTALBITAL, ACETAMINOPHEN AND CAFFEINE 50; 325; 40 MG/1; MG/1; MG/1
1 TABLET ORAL EVERY 6 HOURS
Status: DISCONTINUED | OUTPATIENT
Start: 2019-12-01 | End: 2019-12-01 | Stop reason: HOSPADM

## 2019-12-01 RX ORDER — OXYCODONE HYDROCHLORIDE AND ACETAMINOPHEN 5; 325 MG/1; MG/1
1 TABLET ORAL EVERY 6 HOURS PRN
Qty: 15 TABLET | Refills: 0 | Status: SHIPPED | OUTPATIENT
Start: 2019-12-01 | End: 2019-12-11 | Stop reason: HOSPADM

## 2019-12-01 RX ORDER — DOCUSATE SODIUM 100 MG/1
100 CAPSULE, LIQUID FILLED ORAL 2 TIMES DAILY
Qty: 10 CAPSULE | Refills: 0
Start: 2019-12-01 | End: 2019-12-11 | Stop reason: HOSPADM

## 2019-12-01 RX ORDER — BUTALBITAL, ACETAMINOPHEN AND CAFFEINE 50; 325; 40 MG/1; MG/1; MG/1
1 TABLET ORAL ONCE
Status: CANCELLED | OUTPATIENT
Start: 2019-12-01 | End: 2019-12-01

## 2019-12-01 RX ADMIN — DOCUSATE SODIUM 100 MG: 100 CAPSULE, LIQUID FILLED ORAL at 17:58

## 2019-12-01 RX ADMIN — IBUPROFEN 600 MG: 600 TABLET ORAL at 09:15

## 2019-12-01 RX ADMIN — DOCUSATE SODIUM 100 MG: 100 CAPSULE, LIQUID FILLED ORAL at 09:15

## 2019-12-01 RX ADMIN — BUTALBITAL, ACETAMINOPHEN AND CAFFEINE 1 TABLET: 50; 325; 40 TABLET ORAL at 00:35

## 2019-12-01 RX ADMIN — BUTALBITAL, ACETAMINOPHEN AND CAFFEINE 1 TABLET: 50; 325; 40 TABLET ORAL at 11:31

## 2019-12-01 RX ADMIN — PREDNISONE 40 MG: 20 TABLET ORAL at 09:16

## 2019-12-01 RX ADMIN — ACETAMINOPHEN 650 MG: 325 TABLET ORAL at 11:32

## 2019-12-01 RX ADMIN — IBUPROFEN 600 MG: 600 TABLET ORAL at 17:58

## 2019-12-01 RX ADMIN — OXYCODONE HYDROCHLORIDE AND ACETAMINOPHEN 2 TABLET: 5; 325 TABLET ORAL at 16:04

## 2019-12-01 RX ADMIN — CYCLOBENZAPRINE HYDROCHLORIDE 10 MG: 10 TABLET, FILM COATED ORAL at 16:05

## 2019-12-01 RX ADMIN — BUTALBITAL, ACETAMINOPHEN AND CAFFEINE 1 TABLET: 50; 325; 40 TABLET ORAL at 17:58

## 2019-12-01 NOTE — LACTATION NOTE
This note was copied from a baby's chart  Mom for discharge now  Reviewed expected changes in infant feeding patterns over the next few days, engorgement relief measures, use of feeding log, signs of milk transfer and when and where to call for additional assistance as needed  Given discharge breastfeeding pkat and same reviewed

## 2019-12-01 NOTE — LACTATION NOTE
This note was copied from a baby's chart  Mom states infant was formula fed under bili lights over night due to jaundice and she was pumping  Infant now back out from [facility]  Did nurse well so far  Encouraged to continue to feed every 3 hours and on cue

## 2019-12-01 NOTE — PROGRESS NOTES
Progress Note - OB/GYN   Ana Burton 21 y o  female MRN: [id number]  Unit/Bed#: -01 Encounter: [id number]    Assessment:  Post partum Day #2 s/p RLTCS, stable, baby in nursery    Plan:  1) Spinal headache   -Pain in back of neck   -S/p Fiorcet    2) Alloimmune thrombocytopenia   -Solumedrol 100mg q8h for 48hr starting 11/29/19 @1700-> transition to prednisone 40mg PO for 5 days (starting 12/1/19 @ 2200) then taper down by 10mg every 5 days    3) Continue routine post partum care   Encourage ambulation   Encourage breastfeeding   Patient would like discharge today if baby is cleared to go  Subjective/Objective   Chief Complaint:     Post delivery  Patient is doing well, she reports minimal neck and head pain after Fiorcet  Lochia WNL  Pain well controlled  Subjective:     Pain: yes, cramping, improved with meds  Tolerating PO: yes  Voiding: yes  Flatus: no  BM: no  Ambulating: yes  Breastfeeding:  yes  Chest pain: no  Shortness of breath: no  Leg pain: no  Lochia: minimal    Objective:     Vitals: /84 (BP Location: Right arm)   Pulse 76   Temp 98 3 °F (36 8 °C) (Oral)   Resp 18   Ht 5' 5" (1 651 m)   Wt 112 kg (248 lb)   LMP 03/09/2019 (Exact Date)   SpO2 97%   Breastfeeding? Yes   BMI 41 27 kg/m²       Intake/Output Summary (Last 24 hours) at 12/1/2019 0525  Last data filed at 11/30/2019 1000  Gross per 24 hour   Intake --   Output 1650 ml   Net -1650 ml       Lab Results   Component Value Date    WBC 6 81 11/30/2019    HGB 9 2 (L) 11/30/2019    HCT 29 0 (L) 11/30/2019    MCV 89 11/30/2019     11/30/2019       Physical Exam:     Gen: AAOx3, NAD  CV: RRR  Lungs: CTA b/l  Abd: Soft, non-tender, non-distended, no rebound or guarding  Uterine fundus firm and non-tender, 1 cm below the umbilicus     Incision: C/D/I  Ext: Non tender    Judith Velez MD  12/1/2019  5:25 AM

## 2019-12-01 NOTE — PROGRESS NOTES
Patient was complaining of sharp pain at the base of her neck  She states it has been on and off throughout the day but is currently a little worse at 5/10 pain  She has little to no pain in her head  She has been drinking fluids  Likely a post dural headache  Will treat with Fiorcet and monitor      Casey Lopez MD

## 2019-12-01 NOTE — DISCHARGE SUMMARY
Discharge Summary - OB/GYN   Jarocho Campos 21 y o  female MRN: [id number]  Unit/Bed#: -01 Encounter: [id number]      Admission Date: 2019     Discharge Date: 19    Admitting Diagnosis:   1  Pregnancy at 37w6d  2  Alloimmune thrombocytopenia in pregnancy  3  Obesity: BMI 41    Discharge Diagnosis:   Same, delivered    Procedures: repeat  section, low transverse incision    Delivery Attending: Sallie Saldaña MD  Discharge Attending: Dr Wendy Prather Course:     Jarocho Campos is a 21 y o  Isamar Owen at 37w6d wks who was initially admitted for scheduled repeat  section  Pregnancy was complicated by alloimmune thrombocytopenia and was treated by steroids and IVIG  She delivered a viable male  on 19 at [phone]  Weight 7lbs 9 5oz via repeat  section, low transverse incision  Apgars were 9 (1 min) and 9 (5 min)   was transferred to  nursery  Patient tolerated the procedure well and was transferred to recovery in stable condition  Her post-operative course was complicated by spinal headache treated with Fiorcet  Preoperative hemoglobin was 12 1, postoperative was 9 2  Her postoperative pain was well controlled with oral analgesics  On day of discharge, she was ambulating and able to reasonably perform all ADLs  She was voiding and had appropriate bowel function  Pain was well controlled  She was discharged home on post-operative day #2 without complications  Patient was instructed to follow up with her OB as an outpatient and was given appropriate warnings to call provider if she develops signs of infection or uncontrolled pain  Complications: none apparent    Condition at discharge: good     Discharge instructions/Information to patient and family:   See after visit summary for information provided to patient and family        Provisions for Follow-Up Care:  See after visit summary for information related to follow-up care and any pertinent home health orders  Disposition: Home    Planned Readmission: No    Discharge Medications: For a complete list of the patient's medications, please refer to her med rec      Deangelo Jarvis MD  12/01/19

## 2019-12-05 ENCOUNTER — POSTPARTUM VISIT (OUTPATIENT)
Dept: OBGYN CLINIC | Facility: MEDICAL CENTER | Age: 24
End: 2019-12-05

## 2019-12-05 ENCOUNTER — APPOINTMENT (INPATIENT)
Dept: RADIOLOGY | Facility: HOSPITAL | Age: 24
DRG: 776 | End: 2019-12-05
Payer: COMMERCIAL

## 2019-12-05 ENCOUNTER — HOSPITAL ENCOUNTER (INPATIENT)
Facility: HOSPITAL | Age: 24
LOS: 6 days | Discharge: HOME | DRG: 776 | End: 2019-12-11
Attending: EMERGENCY MEDICINE | Admitting: INTERNAL MEDICINE
Payer: COMMERCIAL

## 2019-12-05 VITALS
BODY MASS INDEX: 38.99 KG/M2 | DIASTOLIC BLOOD PRESSURE: 80 MMHG | WEIGHT: 234 LBS | RESPIRATION RATE: 14 BRPM | SYSTOLIC BLOOD PRESSURE: 134 MMHG | HEIGHT: 65 IN

## 2019-12-05 DIAGNOSIS — R56.9 SEIZURE (HCC): ICD-10-CM

## 2019-12-05 DIAGNOSIS — R31.9 HEMATURIA: ICD-10-CM

## 2019-12-05 DIAGNOSIS — O99.119 ALLOIMMUNE THROMBOCYTOPENIA DURING PREGNANCY, ANTEPARTUM: ICD-10-CM

## 2019-12-05 DIAGNOSIS — G08 THROMBOSIS OF SUPERIOR SAGITTAL SINUS: Primary | ICD-10-CM

## 2019-12-05 DIAGNOSIS — D69.59 ALLOIMMUNE THROMBOCYTOPENIA DURING PREGNANCY, ANTEPARTUM: ICD-10-CM

## 2019-12-05 DIAGNOSIS — Z98.890 POST-OPERATIVE STATE: Primary | ICD-10-CM

## 2019-12-05 DIAGNOSIS — R53.1 LEFT-SIDED WEAKNESS: ICD-10-CM

## 2019-12-05 LAB
ALBUMIN SERPL BCP-MCNC: 2.1 G/DL (ref 3.5–5)
ALP SERPL-CCNC: 129 U/L (ref 46–116)
ALT SERPL W P-5'-P-CCNC: 26 U/L (ref 12–78)
ANION GAP SERPL CALCULATED.3IONS-SCNC: 5 MMOL/L (ref 4–13)
APTT PPP: 156 SECONDS (ref 23–37)
AST SERPL W P-5'-P-CCNC: 45 U/L (ref 5–45)
BACTERIA UR QL AUTO: ABNORMAL /HPF
BASOPHILS # BLD AUTO: 0.02 THOUSANDS/ΜL (ref 0–0.1)
BASOPHILS NFR BLD AUTO: 0 % (ref 0–1)
BILIRUB SERPL-MCNC: 0.38 MG/DL (ref 0.2–1)
BILIRUB UR QL STRIP: NEGATIVE
BUN SERPL-MCNC: 14 MG/DL (ref 5–25)
CALCIUM SERPL-MCNC: 8.7 MG/DL (ref 8.3–10.1)
CHLORIDE SERPL-SCNC: 108 MMOL/L (ref 100–108)
CLARITY UR: ABNORMAL
CO2 SERPL-SCNC: 22 MMOL/L (ref 21–32)
COLOR UR: YELLOW
CREAT SERPL-MCNC: 0.75 MG/DL (ref 0.6–1.3)
CREAT UR-MCNC: 79.7 MG/DL
DEPRECATED AT III PPP: 118 % OF NORMAL (ref 92–136)
EOSINOPHIL # BLD AUTO: 0.05 THOUSAND/ΜL (ref 0–0.61)
EOSINOPHIL NFR BLD AUTO: 1 % (ref 0–6)
ERYTHROCYTE [DISTWIDTH] IN BLOOD BY AUTOMATED COUNT: 13.8 % (ref 11.6–15.1)
GFR SERPL CREATININE-BSD FRML MDRD: 111 ML/MIN/1.73SQ M
GLUCOSE SERPL-MCNC: 94 MG/DL (ref 65–140)
GLUCOSE UR STRIP-MCNC: NEGATIVE MG/DL
HCT VFR BLD AUTO: 34.7 % (ref 34.8–46.1)
HGB BLD-MCNC: 11 G/DL (ref 11.5–15.4)
HGB UR QL STRIP.AUTO: ABNORMAL
IMM GRANULOCYTES # BLD AUTO: 0.11 THOUSAND/UL (ref 0–0.2)
IMM GRANULOCYTES NFR BLD AUTO: 1 % (ref 0–2)
INR PPP: 1.1 (ref 0.84–1.19)
KETONES UR STRIP-MCNC: NEGATIVE MG/DL
LEUKOCYTE ESTERASE UR QL STRIP: NEGATIVE
LYMPHOCYTES # BLD AUTO: 0.81 THOUSANDS/ΜL (ref 0.6–4.47)
LYMPHOCYTES NFR BLD AUTO: 10 % (ref 14–44)
MCH RBC QN AUTO: 27.7 PG (ref 26.8–34.3)
MCHC RBC AUTO-ENTMCNC: 31.7 G/DL (ref 31.4–37.4)
MCV RBC AUTO: 87 FL (ref 82–98)
MONOCYTES # BLD AUTO: 0.19 THOUSAND/ΜL (ref 0.17–1.22)
MONOCYTES NFR BLD AUTO: 2 % (ref 4–12)
NEUTROPHILS # BLD AUTO: 7.26 THOUSANDS/ΜL (ref 1.85–7.62)
NEUTS SEG NFR BLD AUTO: 86 % (ref 43–75)
NITRITE UR QL STRIP: NEGATIVE
NON-SQ EPI CELLS URNS QL MICRO: ABNORMAL /HPF
NRBC BLD AUTO-RTO: 0 /100 WBCS
PH UR STRIP.AUTO: 5.5 [PH]
PLATELET # BLD AUTO: 280 THOUSANDS/UL (ref 149–390)
PMV BLD AUTO: 10.2 FL (ref 8.9–12.7)
POTASSIUM SERPL-SCNC: 5 MMOL/L (ref 3.5–5.3)
PROT SERPL-MCNC: 8.6 G/DL (ref 6.4–8.2)
PROT UR STRIP-MCNC: ABNORMAL MG/DL
PROT UR-MCNC: 110 MG/DL
PROT/CREAT UR: 1.38 MG/G{CREAT} (ref 0–0.1)
PROTHROMBIN TIME: 13.8 SECONDS (ref 11.6–14.5)
RBC # BLD AUTO: 3.97 MILLION/UL (ref 3.81–5.12)
RBC #/AREA URNS AUTO: ABNORMAL /HPF
SODIUM SERPL-SCNC: 135 MMOL/L (ref 136–145)
SP GR UR STRIP.AUTO: 1.02 (ref 1–1.03)
UROBILINOGEN UR QL STRIP.AUTO: 0.2 E.U./DL
WBC # BLD AUTO: 8.44 THOUSAND/UL (ref 4.31–10.16)
WBC #/AREA URNS AUTO: ABNORMAL /HPF

## 2019-12-05 PROCEDURE — 99024 POSTOP FOLLOW-UP VISIT: CPT | Performed by: OBSTETRICS & GYNECOLOGY

## 2019-12-05 PROCEDURE — 86146 BETA-2 GLYCOPROTEIN ANTIBODY: CPT | Performed by: EMERGENCY MEDICINE

## 2019-12-05 PROCEDURE — 82570 ASSAY OF URINE CREATININE: CPT | Performed by: OBSTETRICS & GYNECOLOGY

## 2019-12-05 PROCEDURE — 85305 CLOT INHIBIT PROT S TOTAL: CPT | Performed by: EMERGENCY MEDICINE

## 2019-12-05 PROCEDURE — 81241 F5 GENE: CPT | Performed by: EMERGENCY MEDICINE

## 2019-12-05 PROCEDURE — 70544 MR ANGIOGRAPHY HEAD W/O DYE: CPT

## 2019-12-05 PROCEDURE — 70553 MRI BRAIN STEM W/O & W/DYE: CPT

## 2019-12-05 PROCEDURE — 84156 ASSAY OF PROTEIN URINE: CPT | Performed by: OBSTETRICS & GYNECOLOGY

## 2019-12-05 PROCEDURE — 85732 THROMBOPLASTIN TIME PARTIAL: CPT | Performed by: EMERGENCY MEDICINE

## 2019-12-05 PROCEDURE — 85306 CLOT INHIBIT PROT S FREE: CPT | Performed by: EMERGENCY MEDICINE

## 2019-12-05 PROCEDURE — 99285 EMERGENCY DEPT VISIT HI MDM: CPT

## 2019-12-05 PROCEDURE — 99285 EMERGENCY DEPT VISIT HI MDM: CPT | Performed by: EMERGENCY MEDICINE

## 2019-12-05 PROCEDURE — NC001 PR NO CHARGE: Performed by: INTERNAL MEDICINE

## 2019-12-05 PROCEDURE — 85613 RUSSELL VIPER VENOM DILUTED: CPT | Performed by: EMERGENCY MEDICINE

## 2019-12-05 PROCEDURE — 85670 THROMBIN TIME PLASMA: CPT | Performed by: EMERGENCY MEDICINE

## 2019-12-05 PROCEDURE — 80053 COMPREHEN METABOLIC PANEL: CPT | Performed by: EMERGENCY MEDICINE

## 2019-12-05 PROCEDURE — 93005 ELECTROCARDIOGRAM TRACING: CPT

## 2019-12-05 PROCEDURE — 70498 CT ANGIOGRAPHY NECK: CPT

## 2019-12-05 PROCEDURE — 85610 PROTHROMBIN TIME: CPT | Performed by: EMERGENCY MEDICINE

## 2019-12-05 PROCEDURE — 85705 THROMBOPLASTIN INHIBITION: CPT | Performed by: EMERGENCY MEDICINE

## 2019-12-05 PROCEDURE — 96374 THER/PROPH/DIAG INJ IV PUSH: CPT

## 2019-12-05 PROCEDURE — 86147 CARDIOLIPIN ANTIBODY EA IG: CPT | Performed by: EMERGENCY MEDICINE

## 2019-12-05 PROCEDURE — 99223 1ST HOSP IP/OBS HIGH 75: CPT | Performed by: STUDENT IN AN ORGANIZED HEALTH CARE EDUCATION/TRAINING PROGRAM

## 2019-12-05 PROCEDURE — 85300 ANTITHROMBIN III ACTIVITY: CPT | Performed by: EMERGENCY MEDICINE

## 2019-12-05 PROCEDURE — 96375 TX/PRO/DX INJ NEW DRUG ADDON: CPT

## 2019-12-05 PROCEDURE — 99223 1ST HOSP IP/OBS HIGH 75: CPT | Performed by: INTERNAL MEDICINE

## 2019-12-05 PROCEDURE — 81240 F2 GENE: CPT | Performed by: EMERGENCY MEDICINE

## 2019-12-05 PROCEDURE — 81001 URINALYSIS AUTO W/SCOPE: CPT | Performed by: OBSTETRICS & GYNECOLOGY

## 2019-12-05 PROCEDURE — 85303 CLOT INHIBIT PROT C ACTIVITY: CPT | Performed by: EMERGENCY MEDICINE

## 2019-12-05 PROCEDURE — 85730 THROMBOPLASTIN TIME PARTIAL: CPT | Performed by: EMERGENCY MEDICINE

## 2019-12-05 PROCEDURE — 36415 COLL VENOUS BLD VENIPUNCTURE: CPT | Performed by: EMERGENCY MEDICINE

## 2019-12-05 PROCEDURE — 85025 COMPLETE CBC W/AUTO DIFF WBC: CPT | Performed by: EMERGENCY MEDICINE

## 2019-12-05 PROCEDURE — 70496 CT ANGIOGRAPHY HEAD: CPT

## 2019-12-05 PROCEDURE — A9585 GADOBUTROL INJECTION: HCPCS | Performed by: INTERNAL MEDICINE

## 2019-12-05 RX ORDER — LORAZEPAM 2 MG/ML
INJECTION INTRAMUSCULAR
Status: COMPLETED
Start: 2019-12-05 | End: 2019-12-05

## 2019-12-05 RX ORDER — MAGNESIUM SULFATE HEPTAHYDRATE 40 MG/ML
2 INJECTION, SOLUTION INTRAVENOUS CONTINUOUS
Status: DISCONTINUED | OUTPATIENT
Start: 2019-12-05 | End: 2019-12-06

## 2019-12-05 RX ORDER — MAGNESIUM SULFATE HEPTAHYDRATE 40 MG/ML
2 INJECTION, SOLUTION INTRAVENOUS ONCE
Status: COMPLETED | OUTPATIENT
Start: 2019-12-05 | End: 2019-12-05

## 2019-12-05 RX ORDER — MAGNESIUM SULFATE HEPTAHYDRATE 40 MG/ML
4 INJECTION, SOLUTION INTRAVENOUS ONCE
Status: DISCONTINUED | OUTPATIENT
Start: 2019-12-05 | End: 2019-12-05

## 2019-12-05 RX ORDER — MAGNESIUM SULFATE HEPTAHYDRATE 40 MG/ML
4 INJECTION, SOLUTION INTRAVENOUS ONCE
Status: COMPLETED | OUTPATIENT
Start: 2019-12-05 | End: 2019-12-05

## 2019-12-05 RX ORDER — CHLORHEXIDINE GLUCONATE 0.12 MG/ML
15 RINSE ORAL EVERY 12 HOURS SCHEDULED
Status: DISCONTINUED | OUTPATIENT
Start: 2019-12-05 | End: 2019-12-07

## 2019-12-05 RX ORDER — SODIUM CHLORIDE 9 MG/ML
75 INJECTION, SOLUTION INTRAVENOUS CONTINUOUS
Status: DISCONTINUED | OUTPATIENT
Start: 2019-12-05 | End: 2019-12-07

## 2019-12-05 RX ORDER — HEPARIN SODIUM 10000 [USP'U]/100ML
3-30 INJECTION, SOLUTION INTRAVENOUS
Status: DISCONTINUED | OUTPATIENT
Start: 2019-12-05 | End: 2019-12-11

## 2019-12-05 RX ORDER — HEPARIN SODIUM 1000 [USP'U]/ML
8400 INJECTION, SOLUTION INTRAVENOUS; SUBCUTANEOUS AS NEEDED
Status: DISCONTINUED | OUTPATIENT
Start: 2019-12-05 | End: 2019-12-11

## 2019-12-05 RX ORDER — LORAZEPAM 2 MG/ML
2 INJECTION INTRAMUSCULAR ONCE
Status: COMPLETED | OUTPATIENT
Start: 2019-12-05 | End: 2019-12-05

## 2019-12-05 RX ORDER — LABETALOL 20 MG/4 ML (5 MG/ML) INTRAVENOUS SYRINGE
20 ONCE
Status: COMPLETED | OUTPATIENT
Start: 2019-12-05 | End: 2019-12-05

## 2019-12-05 RX ORDER — HEPARIN SODIUM 1000 [USP'U]/ML
4200 INJECTION, SOLUTION INTRAVENOUS; SUBCUTANEOUS AS NEEDED
Status: DISCONTINUED | OUTPATIENT
Start: 2019-12-05 | End: 2019-12-11

## 2019-12-05 RX ORDER — HEPARIN SODIUM 1000 [USP'U]/ML
8400 INJECTION, SOLUTION INTRAVENOUS; SUBCUTANEOUS ONCE
Status: COMPLETED | OUTPATIENT
Start: 2019-12-05 | End: 2019-12-05

## 2019-12-05 RX ORDER — ACETAMINOPHEN 325 MG/1
650 TABLET ORAL EVERY 4 HOURS PRN
Status: DISCONTINUED | OUTPATIENT
Start: 2019-12-05 | End: 2019-12-11 | Stop reason: HOSPADM

## 2019-12-05 RX ORDER — LABETALOL 20 MG/4 ML (5 MG/ML) INTRAVENOUS SYRINGE
40 ONCE
Status: COMPLETED | OUTPATIENT
Start: 2019-12-05 | End: 2019-12-05

## 2019-12-05 RX ADMIN — LORAZEPAM 2 MG: 2 INJECTION INTRAMUSCULAR; INTRAVENOUS at 17:15

## 2019-12-05 RX ADMIN — MAGNESIUM SULFATE HEPTAHYDRATE 2 G/HR: 40 INJECTION, SOLUTION INTRAVENOUS at 17:36

## 2019-12-05 RX ADMIN — GADOBUTROL 11 ML: 604.72 INJECTION INTRAVENOUS at 23:19

## 2019-12-05 RX ADMIN — CHLORHEXIDINE GLUCONATE 0.12% ORAL RINSE 15 ML: 1.2 LIQUID ORAL at 21:43

## 2019-12-05 RX ADMIN — IOHEXOL 85 ML: 350 INJECTION, SOLUTION INTRAVENOUS at 17:55

## 2019-12-05 RX ADMIN — LABETALOL 20 MG/4 ML (5 MG/ML) INTRAVENOUS SYRINGE 40 MG: at 17:45

## 2019-12-05 RX ADMIN — MAGNESIUM SULFATE HEPTAHYDRATE 2 G: 40 INJECTION, SOLUTION INTRAVENOUS at 17:25

## 2019-12-05 RX ADMIN — ACETAMINOPHEN 650 MG: 325 TABLET ORAL at 21:43

## 2019-12-05 RX ADMIN — LABETALOL 20 MG/4 ML (5 MG/ML) INTRAVENOUS SYRINGE 20 MG: at 16:59

## 2019-12-05 RX ADMIN — HEPARIN SODIUM 8400 UNITS: 1000 INJECTION, SOLUTION INTRAVENOUS; SUBCUTANEOUS at 20:07

## 2019-12-05 RX ADMIN — HEPARIN SODIUM AND DEXTROSE 18 UNITS/KG/HR: 10000; 5 INJECTION INTRAVENOUS at 20:07

## 2019-12-05 RX ADMIN — LORAZEPAM 2 MG: 2 INJECTION INTRAMUSCULAR at 17:15

## 2019-12-05 RX ADMIN — MAGNESIUM SULFATE HEPTAHYDRATE 4 G: 40 INJECTION, SOLUTION INTRAVENOUS at 17:05

## 2019-12-05 RX ADMIN — LEVETIRACETAM 750 MG: 100 INJECTION, SOLUTION INTRAVENOUS at 21:43

## 2019-12-05 NOTE — H&P
H&P Exam - Gynecology   Pedro Winn 21 y o  female MRN: [id number]  Unit/Bed#: ED 09 Encounter: [id number]    Assessment/Plan     Assessment / Plan:  Pedro Winn is a 21year old P12 female postoperative day #6 from scheduled repeat low transverse  section status post grand mal seizure at home, focal seizure activity in the ED, with severe range blood pressures in the ED and left sided motor & sensory deficits  Concern for postpartum eclampsia, rule out other neurologic etiologies  Grand Mal Seizure, suspect eclampsia, rule out other etiologies  Left sided sensory and motor deficits  - Status post IV Ativan in the ED  - Status post Labetalol 20mg and 40mg in the ED for severe range blood pressures  Continue to closely monitor blood pressures, treat acutely for >160/110  - Magnesium sulfate infusion: 6g bolus followed by 2g/hr infusion  - CBC & CMP WNL with no concern for HELLP at this time  Continue to trend CBC & CMP Q6 hours  Follow up Urine P:C    - Concern for alternative neurologic etiologies including but not limited to Max's paralysis vs  PRESS vs  dissection vs  stroke  ICU contacted immediately and following, CTA head/neck pending  Postoperative Management  POD#6 RLTCS   scar well healing, meeting all postoperative goals    Postpartum Management  Breast pump to bedside    FEN  NPO    DVT prophylaxis  SCDs    Disposition  ICU admission    Patient seen and evaluated with Dr Maycol De Jesus; discussed plan of care with Dr Andrei Galeana      History of Present Illness   HPI:  Pedro Winn is a 21 y o  female P12 who presents status post witnessed grand-mal seizure at home  She is postpartum/postoperative day #6 from an uncomplicated scheduled  section  She was discharged home on postoperative day #2  Her pregnancy was notable for  alloimmune thrombocytopenia for which she was receiving IVIG    She was managed on Solu-Medrol postpartum for 48 hours prior to transition to prednisone taper which she started on Tuesday  Her postpartum course was notable for spinal headache which was managed medically with Fioricet, no blood patch was needed  She has no history of any elevated blood pressures during this pregnancy or her previous pregnancy  Her previous pregnancy was also uncomplicated and she delivered via  section due to fetal intolerance of labor  The patient reports that she went to her postpartum appointment today  Blood pressure was noted to be 134/80  She complained of a headache/neckache at that time, unchanged from prior symptoms from her spinal headache after delivery  She denied additional neurologic symptoms at that time  Cranial nerves were intact on physical exam today in the office and no other issues were identified  The patient went home from her appointment and took a nap around 1100  She woke up from her nap at approximately 1500 with a worsening headache and new onset left arm weakness  The patient reports that she remembers feeling "chest tightness" and confusion then lost consciousness  Her partner reports a witnessed seizure "shaking all over" associated with clear foaming at the mouth  He rolled her on her side and called EMS  He notes that the seizure lasted for approximately 6 minutes total  Positive tongue biting, no urinary or fetal incontinence  She was postictsal after that event  She was transferred to the ED  On arrival to the ED blood pressure was noted to be 162/97  ED contacted OB and we immediately went to evaluate the patient  The patient and her partner recounted the information at mentioned above  Repeat blood pressure was 181/116 during our evaluation and 20mg IV labetalol was administered at 1659 and she was started on a 6g Magnesium sulfate bolus at 1705  Attending Physician Dr Jose David Evans present at this time  Her blood pressure initially improved after Labetalol administration   She reported feeling left sided arm "tingling" but was unable to further characterize her deficits  While in the ED at 1714 the patient began experiencing fascial "shaking", "similar to how I felt before the seizure" and Ativan 2mg was administered with resolution in her symptoms, then reported "feeling mellow " Blood pressure at that time was 139/83  15 minutes later her blood pressure was noted again to be elevated in the severe range at 162/106 and 40mg IV Labetalol was administered at 1745 with improvement in blood pressure  Information regarding clinical course and findings was immediately discussed with the ED Physician and Medical ICU Attending  ICU immediately came down to evaluate the patient  The patient was taken for imaging  The patient denies any history of seizure disorder  She denies any other significant past medical history  She denies any new medications that than the prednisone previously mentioned above  Review of Systems   Constitutional: Negative for chills and fever  Eyes: Negative for pain and visual disturbance  Respiratory: Negative for apnea, choking, chest tightness and wheezing  Cardiovascular: Negative for chest pain, palpitations and leg swelling  Gastrointestinal: Negative for abdominal pain, constipation, diarrhea, nausea and vomiting  Genitourinary: Negative for dysuria, flank pain, pelvic pain, vaginal bleeding and vaginal pain  Skin: Negative for pallor and rash   incision well healing   Neurological: Positive for seizures, numbness and headaches  Negative for dizziness, tremors, syncope, facial asymmetry, speech difficulty, weakness and light-headedness  Psychiatric/Behavioral: Negative for behavioral problems and confusion         Historical Information   Past Medical History:   Diagnosis Date     alloimmune thrombocytopenia     Obesity complicating pregnancy in second trimester 2019    Varicella     childhood     Past Surgical History:   Procedure Laterality Date     SECTION      CHOLECYSTECTOMY      2017    CHOLECYSTECTOMY LAPAROSCOPIC N/A 2017    Procedure: CHOLECYSTECTOMY LAPAROSCOPIC;  Surgeon: Samella Najjar, MD;  Location: [facility];  Service: General    AR  DELIVERY ONLY N/A 2019    Procedure: Hugo Heart () REPEAT;  Surgeon: Kathy العراقي MD;  Location: [facility];  Service: Obstetrics     OB History    Para Term  AB Living   3 2 2 0 1 2   SAB TAB Ectopic Multiple Live Births   0 0 0 0 2      # Outcome Date GA Lbr Micah/2nd Weight Sex Delivery Anes PTL Lv   3 Term 19 37w6d  3444 g (7 lb 9 5 oz) M CS-LTranv Spinal N NICK   2 AB 2018              Birth Comments: [place]   1 Term 04/26/15 37w0d  3289 g (7 lb 4 oz) M  Spinal N NICK      Birth Comments: FOB #1      Complications: Fetal intolerance to labor, delivered, current hospitalization, PROM (premature rupture of membranes)       Family History   Problem Relation Age of Onset    Arthritis Mother     Kidney disease Mother     Other Mother         thyroid, thalassemia, anemia    Diabetes Mother         type 2 insulin    Cancer Mother         lung, gist    Hypertension Father     No Known Problems Brother     Cancer Maternal Grandmother         lung    No Known Problems Son     Stroke Maternal Grandfather     Heart disease Maternal Grandfather         MI    No Known Problems Paternal Grandmother     Cancer Paternal Grandfather         brain    Diabetes Maternal Uncle         type 2 insulin     Social History   Social History     Substance and Sexual Activity   Alcohol Use No    Frequency: Never     Social History     Substance and Sexual Activity   Drug Use No     Social History     Tobacco Use   Smoking Status Former Smoker    Packs/day: 0 50   Smokeless Tobacco Never Used   Tobacco Comment    1-2       Meds/Allergies   all current active meds have been reviewed  Allergies   Allergen Reactions    Bee Venom Anaphylaxis     Is suppose to carry EpiPen    Tobramycin      Pt states she is not sure if she is allergic to this        Objective   Vitals: Blood pressure 162/97, pulse (!) 126, temperature 99 4 °F (37 4 °C), temperature source Oral, resp  rate 18, height 5' 5" (1 651 m), weight 107 kg (234 lb 12 6 oz), last menstrual period 03/09/2019, SpO2 98 %, currently breastfeeding  No intake or output data in the 24 hours ending 12/05/19 1707    Invasive Devices: Invasive Devices     Peripheral Intravenous Line            Peripheral IV 12/05/19 Left Hand less than 1 day    Peripheral IV 12/05/19 Right Wrist less than 1 day                Neurologic Exam     Cranial Nerves     CN II   Right visual field deficit: none  Left visual field deficit: none     CN IX, X   CN IX normal        Physical Exam   Constitutional: She appears well-developed and well-nourished  She is easily aroused  She does not appear ill  No distress  HENT:   Head: Normocephalic  Head is with abrasion (Right side from mouth down chin) and with laceration (Right lip)  Eyes: Conjunctivae and lids are normal    Neck: Normal range of motion  Cardiovascular: Regular rhythm, S1 normal, S2 normal and normal heart sounds  Tachycardia present  Exam reveals no gallop and no friction rub  No murmur heard  Pulmonary/Chest: Effort normal and breath sounds normal  No stridor  No respiratory distress  She has no wheezes  Abdominal: Soft  She exhibits no distension  There is no tenderness  There is no guarding  Pfanninsteal incision clean, dry, and intact without evidence of infection, erythema, or bleeding   Neurological: She is easily aroused  CN2-12 grossly intact, no nystagmus, no facial droop  RUE/ RLE 5/5 strength, sensation intact throughout  LUE/LLE 3/5 strength & decreased sensation throughout  Intact reflexes bilaterally    Skin: Skin is warm and dry  She is not diaphoretic  Vitals reviewed        Lab Results: I have personally reviewed pertinent reports  Imaging: I have personally reviewed pertinent reports  Code Status: FULL    ATTESTATION:  I saw patient in the ED after stabilization om 12/5/2019  I reviewed history with patient and family  I re-examined the patient and I agree with resident's findings  I have reviewed the notes, assessments, and/or procedures performed by Dr Lety Sharif  I concur with her/his documentation of Jorge Rosenberg  I discussed differential diagnosis of eclampsia versus stroke and need for ICU admission with patient and family

## 2019-12-05 NOTE — ED PROVIDER NOTES
History  Chief Complaint   Patient presents with    Seizure - New Onset     Pt had a seizure lasting 2 minutes per boyfriend  Pt is 6 days postpartem     Patient is a 69-year-old  past medical history of  alloimmune thrombocytopenia 6 days status post  delivery who presents to the emergency department for evaluation of seizure  Patient states that other than headache for the last 5 days, indication blurred vision, she has felt well  She went to routine OBGYN appointment today, and was reportedly normotensive during her visit  She states that she went home for her OBGYN appointment, and began feeling numbness in her left arm, like it had fallen asleep    Patient states that she felt "different,"prior to having her seizure and she felt as if she was starting to [facility] " Patient subsequently experienced a seizure lasting anywhere from 2-6 minutes in duration  The patient's boyfriend witnessed the seizure, he states that she had "shaking all over," he laid her down on the sofa and called EMS  On EMS arrival and report from EMS, patient was alert and oriented but demonstrated generalized weakness for them  Patient denies a prior history of seizures, she denies any recent falls or trauma  She states that she did start new medications including Percocet after her  for analgesia, and was started on Flexeril secondary to neck pain and tension she has been having for the last 5 days  The patient delivered via scheduled   37 weeks 1 day, scheduled due to her history  autoimmune thrombocytopenia  No complications in the postpartum period, discharged home  Patient did receive spinal epidural analgesia for her , and states she was "difficult to stick," with a few attempts prior to catheter placement   She states that the next day she had a headache, associated with neck pain, and that she thought it was secondary to her epidural   She said her headache was gradual in onset, but has been worsening over the last few days although she currently denies headache  She states that it is a generalized headache, and that at times it is associated with blurry vision although she denies blurred vision currently  She denies current vision changes, denies headache and neck pain currently, she denies hearing changes, denies weakness, rashes, chest pain, shortness of breath, cough, abdominal pain, nausea, vomiting, lower extremity edema  She has no prior history of DVT or PE, no family history of DVT or PE, denies drugs, denies alcohol  Per EMS glucose within normal limits  History provided by:  Patient and significant other   used: No    Seizure - New Onset   Seizure type:  Grand mal  Preceding symptoms: aura    Initial focality:  None  Episode characteristics: abnormal movements, confusion, generalized shaking and tongue biting    Episode characteristics: no eye deviation    Return to baseline: yes    Timing:  Once  Progression:  Unchanged  Context: not alcohol withdrawal, not drug use, not family hx of seizures, not fever, not possible hypoglycemia, not pregnant and not previous head injury    Recent head injury:  No recent head injuries  PTA treatment:  None  History of seizures: no        Prior to Admission Medications   Prescriptions Last Dose Informant Patient Reported? Taking?    Prenatal MV & Min w/FA-DHA (PRENATAL ADULT GUMMY/DHA/FA PO) Past Week at Unknown time Self Yes Yes   Sig: Take by mouth daily   acetaminophen (TYLENOL) 325 mg tablet 12/4/2019 at Unknown time  No Yes   Sig: Take 2 tablets (650 mg total) by mouth every 4 (four) hours as needed for mild pain   cyclobenzaprine (FLEXERIL) 10 mg tablet 12/5/2019 at Unknown time  No Yes   Sig: Take 1 tablet (10 mg total) by mouth 3 (three) times a day as needed for muscle spasms   docusate sodium (COLACE) 100 mg capsule Past Week at Unknown time  No Yes   Sig: Take 1 capsule (100 mg total) by mouth 2 (two) times a day   ibuprofen (MOTRIN) 600 mg tablet Past Week at Unknown time  No Yes   Sig: Take 1 tablet (600 mg total) by mouth every 6 (six) hours as needed (cramping)   oxyCODONE-acetaminophen (PERCOCET) 5-325 mg per tablet 2019 at Unknown time  No Yes   Sig: Take 1 tablet by mouth every 6 (six) hours as needed for severe pain for up to 10 daysMax Daily Amount: 4 tablets   predniSONE 10 mg tablet 2019 at Unknown time  No Yes   Sig: Take 4 tablets (40 mg total) by mouth daily for 4 days Then 3 tabs/day for 5 days, then 2 tabs/day for 5 days, then 1 tab/day for 5 days, 1tab/every 2 days for 4 days      Facility-Administered Medications: None       Past Medical History:   Diagnosis Date     alloimmune thrombocytopenia     Obesity complicating pregnancy in second trimester 2019    Varicella     childhood       Past Surgical History:   Procedure Laterality Date     SECTION      CHOLECYSTECTOMY          CHOLECYSTECTOMY LAPAROSCOPIC N/A 2017    Procedure: CHOLECYSTECTOMY LAPAROSCOPIC;  Surgeon: Javier Moreland MD;  Location: [facility];  Service: General    [place]  DELIVERY ONLY N/A 2019    Procedure: Dianna Ramirez () REPEAT;  Surgeon: August Thayer MD;  Location: [facility];  Service: Obstetrics       Family History   Problem Relation Age of Onset    Arthritis Mother     Kidney disease Mother     Other Mother         thyroid, thalassemia, anemia    Diabetes Mother         type 2 insulin    Cancer Mother         lung, gist    Hypertension Father     No Known Problems Brother     Cancer Maternal Grandmother         lung    No Known Problems Son     Stroke Maternal Grandfather     Heart disease Maternal Grandfather         MI    No Known Problems Paternal Grandmother     Cancer Paternal Grandfather         brain    Diabetes Maternal Uncle         type 2 insulin     I have reviewed and agree with the history as documented  Social History     Tobacco Use    Smoking status: Former Smoker     Packs/day: 0 50    Smokeless tobacco: Never Used    Tobacco comment: 1-2   Substance Use Topics    Alcohol use: No     Frequency: Never    Drug use: No        Review of Systems   Constitutional: Negative  Negative for appetite change, chills and fever  HENT: Negative  Eyes: Negative  Negative for photophobia and visual disturbance  Respiratory: Negative  Negative for cough, chest tightness and shortness of breath  Cardiovascular: Negative  Negative for chest pain and leg swelling  Gastrointestinal: Negative  Negative for abdominal pain, blood in stool, constipation, diarrhea, nausea and vomiting  Endocrine: Negative  Genitourinary: Negative  Negative for difficulty urinating, dysuria, flank pain, frequency and urgency  Musculoskeletal: Negative  Negative for back pain, neck pain and neck stiffness  Skin: Negative  Allergic/Immunologic: Negative  Neurological: Positive for seizures and numbness  Negative for dizziness, weakness, light-headedness and headaches  Hematological: Negative  Psychiatric/Behavioral: Negative  Physical Exam  ED Triage Vitals   Temperature Pulse Respirations Blood Pressure SpO2   12/05/19 1634 12/05/19 1634 12/05/19 1634 12/05/19 1634 12/05/19 1634   99 4 °F (37 4 °C) (!) 126 18 162/97 98 %      Temp Source Heart Rate Source Patient Position - Orthostatic VS BP Location FiO2 (%)   12/05/19 1634 12/05/19 1634 12/05/19 1634 12/05/19 1634 --   Oral Monitor Sitting Right arm       Pain Score       12/05/19 1630       No Pain             Orthostatic Vital Signs  Vitals:    12/05/19 1730 12/05/19 1800 12/05/19 1830 12/05/19 1922   BP: (!) 162/106 134/86 141/87 142/83   Pulse: 96 105 103 (!) 107   Patient Position - Orthostatic VS:   Lying Sitting       Physical Exam   Constitutional: She is oriented to person, place, and time   She appears well-developed and well-nourished  HENT:   Head: Normocephalic and atraumatic  Right Ear: External ear normal    Left Ear: External ear normal    Nose: Nose normal    Mouth/Throat: Oropharynx is clear and moist and mucous membranes are normal    Ecchymosis, abrasion of right anterior surface of tongue  Small amount of dried blood around mouth  Eyes: Pupils are equal, round, and reactive to light  Conjunctivae, EOM and lids are normal  No scleral icterus  Pupils are 5mm b/l   Neck: Normal range of motion and full passive range of motion without pain  Neck supple  No JVD present  No tracheal deviation present  Cardiovascular: Regular rhythm, normal heart sounds and intact distal pulses  Tachycardia present  No murmur heard  Tachycardia    Pulmonary/Chest: Effort normal and breath sounds normal  No respiratory distress  Abdominal: Soft  Bowel sounds are normal  She exhibits no distension  There is no hepatosplenomegaly  There is no tenderness  There is no guarding  Low transverse incision clean, dry and intact w/o warmth, erythema, fluctuance or induration    Musculoskeletal: Normal range of motion  She exhibits no edema  Neurological: She is alert and oriented to person, place, and time  A sensory deficit is present  No cranial nerve deficit  She exhibits normal muscle tone  GCS eye subscore is 4  GCS verbal subscore is 5  GCS motor subscore is 6  Diminished sensation to light touch LUE, intact to pain sensation  Sensation intact to light touch all other extremities   LUE 4/5 strength, 5/5 strength all other extremities  cerebellar functioning intact    Skin: Skin is warm and dry  Capillary refill takes less than 2 seconds  She is not diaphoretic  Psychiatric: She has a normal mood and affect  Her behavior is normal    Vitals reviewed        ED Medications  Medications   magnesium sulfate 20 g/500 mL infusion (premix) (2 g/hr Intravenous New Bag 12/5/19 4142)   heparin (porcine) 25,000 units in 250 mL infusion (premix) (18 Units/kg/hr × 105 kg (Order-Specific) Intravenous New Bag 12/5/19 2007)   heparin (porcine) injection 8,400 Units (has no administration in time range)   heparin (porcine) injection 4,200 Units (has no administration in time range)   chlorhexidine (PERIDEX) 0 12 % oral rinse 15 mL (has no administration in time range)   sodium chloride 0 9 % infusion (has no administration in time range)   magnesium sulfate 4 g/100 mL IVPB (premix) 4 g (0 g Intravenous Stopped 12/5/19 1725)     Followed by   magnesium sulfate 2 g/50 mL IVPB (premix) 2 g (0 g Intravenous Stopped 12/5/19 1736)   Labetalol HCl (NORMODYNE) injection 20 mg (20 mg Intravenous Given 12/5/19 1659)   LORazepam (ATIVAN) 2 mg/mL injection 2 mg (2 mg Intravenous Given 12/5/19 1715)   Labetalol HCl (NORMODYNE) injection 40 mg (40 mg Intravenous Given 12/5/19 1745)   iohexol (OMNIPAQUE) 350 MG/ML injection (MULTI-DOSE) 85 mL (85 mL Intravenous Given 12/5/19 1755)   heparin (porcine) injection 8,400 Units (8,400 Units Intravenous Given 12/5/19 2007)       Diagnostic Studies  Results Reviewed     Procedure Component Value Units Date/Time    CBC [[id number]]     Lab Status:  No result Specimen:  Blood     Comprehensive metabolic panel [[id number]]     Lab Status:  No result Specimen:  Blood     Antithrombin III Activity [[id number]] Collected:  12/05/19 2005    Lab Status:  No result Specimen:  Blood from Arm, Right     Cardiolipin antibody [[id number]] Collected:  12/05/19 2005    Lab Status:  No result Specimen:  Blood from Arm, Right     Factor 5 leiden [[id number]] Collected:  12/05/19 2005    Lab Status:  No result Specimen:  Blood from Arm, Right     Lupus anticoagulant [[id number]] Collected:  12/05/19 2005    Lab Status:  No result Specimen:  Blood from Arm, Right     Protein C activity [[id number]] Collected:  12/05/19 2005    Lab Status:  No result Specimen:  Blood from Arm, Right     Protein S activity [[id number]] Collected:  12/05/19 2005    Lab Status:  No result Specimen:  Blood from Arm, Right     Protein S Antigen, Total & Free [[id number]] Collected:  12/05/19 2005    Lab Status:  No result Specimen:  Blood from Arm, Right     Prothrombin gene mutation [[id number]] Collected:  12/05/19 2005    Lab Status:  No result Specimen:  Blood from Arm, Right     Beta-2 glycoprotein antibodies [[id number]] Collected:  12/05/19 2005    Lab Status:  No result Specimen:  Blood from Arm, Right     Urinalysis with microscopic [[id number]] Collected:  12/05/19 1720    Lab Status:   In process Specimen:  Urine Updated:  12/05/19 1941    Protime-INR [[id number]]     Lab Status:  No result Specimen:  Blood     APTT [[id number]]     Lab Status:  No result Specimen:  Blood     Protein / creatinine ratio, urine [[id number]]  (Abnormal) Collected:  12/05/19 1720    Lab Status:  Final result Specimen:  Urine, Clean Catch Updated:  12/05/19 1826     Creatinine, Ur 79 7 mg/dL      Protein Urine Random 110 mg/dL      Prot/Creat Ratio, Ur 1 38    Comprehensive metabolic panel [[id number]]  (Abnormal) Collected:  12/05/19 1700    Lab Status:  Final result Specimen:  Blood from Arm, Left Updated:  12/05/19 1730     Sodium 135 mmol/L      Potassium 5 0 mmol/L      Chloride 108 mmol/L      CO2 22 mmol/L      ANION GAP 5 mmol/L      BUN 14 mg/dL      Creatinine 0 75 mg/dL      Glucose 94 mg/dL      Calcium 8 7 mg/dL      AST 45 U/L      ALT 26 U/L      Alkaline Phosphatase 129 U/L      Total Protein 8 6 g/dL      Albumin 2 1 g/dL      Total Bilirubin 0 38 mg/dL      eGFR 111 ml/min/1 73sq m     Narrative:       [place] guidelines for Chronic Kidney Disease (CKD):     Stage 1 with normal or high GFR (GFR > 90 mL/min/1 73 square meters)    Stage 2 Mild CKD (GFR = 60-89 mL/min/1 73 square meters)    Stage 3A Moderate CKD (GFR = 45-59 mL/min/1 73 square meters)    Stage 3B Moderate CKD (GFR = 30-44 mL/min/1 73 square meters)    Stage 4 Severe CKD (GFR = 15-29 mL/min/1 73 square meters)    Stage 5 End Stage CKD (GFR <15 mL/min/1 73 square meters)  Note: GFR calculation is accurate only with a steady state creatinine    CBC and differential [[id number]]  (Abnormal) Collected:  12/05/19 1700    Lab Status:  Final result Specimen:  Blood from Arm, Left Updated:  12/05/19 1720     WBC 8 44 Thousand/uL      RBC 3 97 Million/uL      Hemoglobin 11 0 g/dL      Hematocrit 34 7 %      MCV 87 fL      MCH 27 7 pg      MCHC 31 7 g/dL      RDW 13 8 %      MPV 10 2 fL      Platelets 345 Thousands/uL      nRBC 0 /100 WBCs      Neutrophils Relative 86 %      Immat GRANS % 1 %      Lymphocytes Relative 10 %      Monocytes Relative 2 %      Eosinophils Relative 1 %      Basophils Relative 0 %      Neutrophils Absolute 7 26 Thousands/µL      Immature Grans Absolute 0 11 Thousand/uL      Lymphocytes Absolute 0 81 Thousands/µL      Monocytes Absolute 0 19 Thousand/µL      Eosinophils Absolute 0 05 Thousand/µL      Basophils Absolute 0 02 Thousands/µL     POCT urinalysis dipstick [[id number]]     Lab Status:  No result                  CTA head and neck with and without contrast   Final Result by Esdras Chávez MD (12/05 5199)      Superior sagittal sinus thrombosis  Contrast-enhanced brain MRI with bravo sequence recommended for further evaluation                 Workstation performed: [id number]               Procedures  Procedures      ED Course  ED Course as of Dec 05 2009   Thu Dec 05, 2019   1646 Spoke with obgyn who will come evaluate the patient      [phone] Admitted to obgyn, pending imaging which will be done prior to leaving ED,       1837 Procedure Note: EKG  Date/Time: 12/05/19 6:37 PM   Interpreted by: Iris Mercer DO  Indications / Diagnosis: tachycardia  ECG reviewed by me, the ED Physician: yes   The EKG demonstrates:  Rhythm: sinus tachycardia   Intervals: normal intervals  Axis: normal axis  QRS/Blocks: normal QRS  ST Changes: No acute ST Changes, no STD/RIMA           1911 Superior sagittal sinus thrombosis  Heparin ordered  Updated critical care on imaging results, and updated patient and her boyfriend about results  MDM  Number of Diagnoses or Management Options  Thrombosis of superior sagittal sinus: new and requires workup  Diagnosis management comments: Patient with new onset seizure, post-partum, and hypertensive  Concern for eclampsia, IV access established with 2 large-bore IVs  will give mag sulfate 6g, obgyn paged to evaluate the patient  Will obtain CTA head/neck with venogram delays to evaluate for venous sinus thrombosis given seizure, hx of headaches  Labs to evaluate renal/hepatic funciton, CBC to evaluate for thrombocytopenia, EKG given tachycardia and seizure  On imaging, sagittal sinus venous thrombosis, updated family about diagnosis and treatment, will start on heparin infusion   Updated critical care and obgyn on CT imaging findings  Admit to ICU  Amount and/or Complexity of Data Reviewed  Clinical lab tests: reviewed and ordered  Tests in the radiology section of CPT®: ordered and reviewed  Tests in the medicine section of CPT®: ordered and reviewed  Review and summarize past medical records: yes  Discuss the patient with other providers: yes  Independent visualization of images, tracings, or specimens: yes          Disposition  Final diagnoses:    Thrombosis of superior sagittal sinus   Seizure ([facility] [address] )   Left-sided weakness     Time reflects when diagnosis was documented in both MDM as applicable and the Disposition within this note     Time User Action Codes Description Comment    12/5/2019  6:42 PM Brien Bailey Add [O15 2] Eclampsia, postpartum     12/5/2019  7:13 PM Brien Bailey Remove [O15 2] Eclampsia, postpartum     12/5/2019  7:13 PM Brien Bailey Add [G08] Thrombosis of superior sagittal sinus     12/5/2019  8:05 PM Abby Blank [R56 9] Seizure ([address] )     12/5/2019  8:05 PM Luke Cabrera Add [R53 1] Left-sided weakness       ED Disposition     ED Disposition Condition Date/Time Comment    Admit  Thu Dec 5, 2019  7:30 PM Admitting Physician: Grant Kelley [[id number]]   Level of Care: Critical Care [15]        Follow-up Information    None         Patient's Medications   Discharge Prescriptions    No medications on file     No discharge procedures on file  ED Provider  Attending physically available and evaluated Kamran Mace I managed the patient along with the ED Attending      Electronically Signed by         Keke Doshi DO  12/05/19 2009

## 2019-12-05 NOTE — ED ATTENDING ATTESTATION
Barbi Murrieta MD, saw and evaluated the patient  All available labs and X-rays were ordered by me or the resident and have been reviewed by myself  I discussed the patient with the resident / non-physician and agree with the resident's / non-physician practitioner's findings and plan as documented in the resident's / non-physician practicitioner's note, except where noted  At this point, I agree with the current assessment done in the ED  I was present during key portions of all procedures performed unless otherwise stated  Chief Complaint   Patient presents with    Seizure - New Onset     Pt had a seizure lasting 2 minutes per boyfriend  Pt is 6 days postpartem     This is a 69-year-old female presenting for evaluation of likely eclampsia  The patient is , 1st pregnancy was uncomplicated without any blood pressure or any problems, 2nd pregnancy was done via , 37 weeks for elevated thrombocytopenia  She is getting IVIG and steroids  She had an epidural for the case  She is 6 days postpartum  Since the procedure, she has been having headache with blurry vision  Maybe a little bit of a migraine but was doing at her baseline until just before coming in  She had a witnessed 2-6 minutes seizure witnessed by the boyfriend  She had left arm numbness and tonic-clonic movements before it  She was groggy when EMS arrived, behaving postictal with tachycardia  Patient does not recall events  No falls no head trauma  No recent fevers chills, felt nauseous no vomiting  She did bite her tongue  She is on Flexeril and Percocet for control of neck pain and her hysterectomy pain    No focal neurologic problems in the past  PE:  Vitals:    19 1800 19 1830 19 1922 19 1930   BP: 134/86 141/87 142/83    BP Location:  Right arm Right arm    Pulse: 105 103 (!) 107    Resp: 16 16 18    Temp:       TempSrc:       SpO2: 97% 96% 98%    Weight:    106 kg (233 lb 0 4 oz) Height:       General: VS reviewed  Appears uncomfortable  Post-ictal  Well-nourished, well-developed  Appears stated age  Speaking normally in full sentences  Head: Normocephalic, atraumatic, nontender  Eyes: PERRL, EOM-I  No diplopia  No hyphema  No subconjunctival hemorrhages  Symmetrical lids  ENT: Atraumatic external nose and ears  Dry MM  No malocclusion  No stridor  Normal phonation  No drooling  Normal swallowing  Neck: Symmetric, trachea midline  No JVD  CV: tachycardia ()  +S1/S2  No murmurs or gallops  Peripheral pulses +2 throughout  No chest wall tenderness  Lungs:   Unlabored No retractions  CTAB, lungs sounds equal bilateral    No tachypnea  Abd: +BS, soft, NT/ND    MSK:   FROM   Back:   No rashes  Skin: Dry, intact  Neuro: Awake  Answering questions awake answering questions oriented x3 left upper extremity weakness left lower extremity weakness 4 5 compared to the opposite side  Sensation intact throughout however she says it is different the left than the right  Pupils equally round and reactive, extraocular is intact no nystagmus normal speech  There is a tongue bite present on the right lateral aspect with blood from the external skin  Psychiatric/Behavioral: Appropriate mood and affect   Exam: deferred  A:  - seizure, hypertension  P:  - high suspicion that this represents eclampsia, will begin magnesium, consult OB  I think that sinus venous thrombosis is on the differential, will do CTA, CTV and likely admit to the ICU    - Labs  - Anti-hypertensives per OBGYN  - 13 point ROS was performed and all are normal unless stated in the history above  - Nursing note reviewed  Vitals reviewed  - Orders placed by myself and/or advanced practitioner / resident     - Previous chart was reviewed  - No language barrier    - History obtained from boyfriend, patient  - There are no limitations to the history obtained     - Critical care time: 60 minutes  - Critical care time was exclusive of seperately bilable procedures and treating other patients as well as teaching time  - Critical care was necessary to treat or prevent imminent or life-threatening deterioration of the following condition: eclampsia  - Critical care time was spent personally by me on the following activities as well as the above as per the ED course and rest of chart: blood draw for specimens, obtaining history from patient / surrogate, developement of a treatment plan, discussions with consultants, evaluation of patient's response to the treatment, examination of the patient, ordering/performing treatements and interventions, re-evaluation of the patient's condition, review of old charts, ordering/reviewing laboratory studies, ordering/reviewing of radiographic studies  - Patient does not need initiation of IV thrombolytics: NIHSS Score = 2 but had seizure and I think there's a different diagnosis going on, not stroke    Code Status: Level 1 - Full Code  Advance Directive and Living Will:      Power of :    POLST:      Final Diagnosis:  1  Thrombosis of superior sagittal sinus    2  Seizure ([address] )    3   Left-sided weakness        ED Course as of Dec 05 2125   Thu Dec 05, 2019   1739 Patient had a seizure, focal  Ativan given by OBGYN        1828 Prot/Creat Ratio, Ur(!): 1 38     Medications   magnesium sulfate 20 g/500 mL infusion (premix) (2 g/hr Intravenous New Bag 12/5/19 1736)   heparin (porcine) 25,000 units in 250 mL infusion (premix) (18 Units/kg/hr × 105 kg (Order-Specific) Intravenous New Bag 12/5/19 2007)   heparin (porcine) injection 8,400 Units (has no administration in time range)   heparin (porcine) injection 4,200 Units (has no administration in time range)   chlorhexidine (PERIDEX) 0 12 % oral rinse 15 mL (has no administration in time range)   sodium chloride 0 9 % infusion (has no administration in time range)   levETIRAcetam (KEPPRA) 750 mg in sodium chloride 0 9 % 100 mL IVPB (has no administration in time range)   magnesium sulfate 4 g/100 mL IVPB (premix) 4 g (0 g Intravenous Stopped 12/5/19 1725)     Followed by   magnesium sulfate 2 g/50 mL IVPB (premix) 2 g (0 g Intravenous Stopped 12/5/19 1736)   Labetalol HCl (NORMODYNE) injection 20 mg (20 mg Intravenous Given 12/5/19 1659)   LORazepam (ATIVAN) 2 mg/mL injection 2 mg (2 mg Intravenous Given 12/5/19 1715)   Labetalol HCl (NORMODYNE) injection 40 mg (40 mg Intravenous Given 12/5/19 1745)   iohexol (OMNIPAQUE) 350 MG/ML injection (MULTI-DOSE) 85 mL (85 mL Intravenous Given 12/5/19 1755)   heparin (porcine) injection 8,400 Units (8,400 Units Intravenous Given 12/5/19 2007)     CTA head and neck with and without contrast   Final Result      Superior sagittal sinus thrombosis  Contrast-enhanced brain MRI with bravo sequence recommended for further evaluation                 Workstation performed: [id number]         MRI brain w wo contrast    (Results Pending)   MRV head wo contrast    (Results Pending)     Orders Placed This Encounter   Procedures    CTA head and neck with and without contrast    MRI brain w wo contrast    MRV head wo contrast    Comprehensive metabolic panel    CBC and differential    CBC    Comprehensive metabolic panel    Protein / creatinine ratio, urine    Urinalysis with microscopic    Protime-INR    APTT    Antithrombin III Activity    Cardiolipin antibody    Factor 5 leiden    Lupus anticoagulant    Protein C activity    Protein S activity    Protein S Antigen, Total & Free    Prothrombin gene mutation    Beta-2 glycoprotein antibodies    Diet NPO    Vital signs, reflexes, pulse oximetry and clonus checks while on Magnesium    Intake and Output; Call less than 60ml x 2 hours    Nursing Communication Fluid Restriction: 3000ml a day    Auscultate lung sounds    Deep tendon reflexes    Measure blood pressure    Heparin: VTE/PE  Infusion Protocol Administration Instructions    Heparin: VTE/PE  Infusion Protocol Notify Physician If:    Heparin Infusion and Platelet Monitoring Per Protocol    Cardio-Pulmonary Monitoring (Critical Care & Step Down Only)    Vital Signs    Daily weights    I/O    CAM (ICU) Assessment    Nursing dysphagia assessment    Turn patient    Reason opt out early mobilization protocol    Neuro checks    Place sequential compression device    Level 1-Full Code: all life saving measures are indicated    Consult to Case Management    Inpatient consult to Neurology    Occupational Therapy Evaluation and Treatment    Physical Therapy  Evaluation and Treatment    POCT urinalysis dipstick    ECG 12 lead    Inpatient Admission    Bed Request    Fall precautions     Labs Reviewed   COMPREHENSIVE METABOLIC PANEL - Abnormal       Result Value Ref Range Status    Sodium 135 (*) 136 - 145 mmol/L Final    Potassium 5 0  3 5 - 5 3 mmol/L Final    Comment: Moderately Hemolyzed; Results May be Affected    Chloride 108  100 - 108 mmol/L Final    CO2 22  21 - 32 mmol/L Final    ANION GAP 5  4 - 13 mmol/L Final    BUN 14  5 - 25 mg/dL Final    Creatinine 0 75  0 60 - 1 30 mg/dL Final    Comment: Standardized to IDMS reference method    Glucose 94  65 - 140 mg/dL Final    Comment:   If the patient is fasting, the ADA then defines impaired fasting glucose as > 100 mg/dL and diabetes as > or equal to 123 mg/dL  Specimen collection should occur prior to Sulfasalazine administration due to the potential for falsely depressed results  Specimen collection should occur prior to Sulfapyridine administration due to the potential for falsely elevated results  Calcium 8 7  8 3 - 10 1 mg/dL Final    AST 45  5 - 45 U/L Final    Comment: Moderately Hemolyzed; Results May be Affected  Specimen collection should occur prior to Sulfasalazine administration due to the potential for falsely depressed results       ALT 26  12 - 78 U/L Final    Comment:   Specimen collection should occur prior to Sulfasalazine and/or Sulfapyridine administration due to the potential for falsely depressed results       Alkaline Phosphatase 129 (*) 46 - 116 U/L Final    Total Protein 8 6 (*) 6 4 - 8 2 g/dL Final    Albumin 2 1 (*) 3 5 - 5 0 g/dL Final    Total Bilirubin 0 38  0 20 - 1 00 mg/dL Final    eGFR 111  ml/min/1 73sq m Final    Narrative:     [place] guidelines for Chronic Kidney Disease (CKD):     Stage 1 with normal or high GFR (GFR > 90 mL/min/1 73 square meters)    Stage 2 Mild CKD (GFR = 60-89 mL/min/1 73 square meters)    Stage 3A Moderate CKD (GFR = 45-59 mL/min/1 73 square meters)    Stage 3B Moderate CKD (GFR = 30-44 mL/min/1 73 square meters)    Stage 4 Severe CKD (GFR = 15-29 mL/min/1 73 square meters)    Stage 5 End Stage CKD (GFR <15 mL/min/1 73 square meters)  Note: GFR calculation is accurate only with a steady state creatinine   CBC AND DIFFERENTIAL - Abnormal    WBC 8 44  4 31 - 10 16 Thousand/uL Final    RBC 3 97  3 81 - 5 12 Million/uL Final    Hemoglobin 11 0 (*) 11 5 - 15 4 g/dL Final    Hematocrit 34 7 (*) 34 8 - 46 1 % Final    MCV 87  82 - 98 fL Final    MCH 27 7  26 8 - 34 3 pg Final    MCHC 31 7  31 4 - 37 4 g/dL Final    RDW 13 8  11 6 - 15 1 % Final    MPV 10 2  8 9 - 12 7 fL Final    Platelets 375  661 - 390 Thousands/uL Final    nRBC 0  /100 WBCs Final    Neutrophils Relative 86 (*) 43 - 75 % Final    Immat GRANS % 1  0 - 2 % Final    Lymphocytes Relative 10 (*) 14 - 44 % Final    Monocytes Relative 2 (*) 4 - 12 % Final    Eosinophils Relative 1  0 - 6 % Final    Basophils Relative 0  0 - 1 % Final    Neutrophils Absolute 7 26  1 85 - 7 62 Thousands/µL Final    Immature Grans Absolute 0 11  0 00 - 0 20 Thousand/uL Final    Lymphocytes Absolute 0 81  0 60 - 4 47 Thousands/µL Final    Monocytes Absolute 0 19  0 17 - 1 22 Thousand/µL Final    Eosinophils Absolute 0 05  0 00 - 0 61 Thousand/µL Final    Basophils Absolute 0 02  0 00 - 0 10 Thousands/µL Final PROTEIN / CREATININE RATIO, URINE - Abnormal    Creatinine, Ur 79 7  mg/dL Final    Protein Urine Random 110  mg/dL Final    Prot/Creat Ratio, Ur 1 38 (*) 0 00 - 0 10 Final   PROTIME-INR   APTT   ANTITHROMBIN III ACTIVITY   CARDIOLIPIN ANTIBODY   FACTOR 5 LEIDEN   LUPUS ANTICOAGULANT   PROTEIN C ACTIVITY   PROTEIN S ACTIVITY   PROTEIN S ANTIGEN, TOTAL & FREE   PROTHROMBIN GENE MUTATION   BETA-2 GLYCOPROTEIN ANTIBODIES   CBC AND PLATELET   COMPREHENSIVE METABOLIC PANEL   POCT URINALYSIS DIPSTICK   THROMBOSIS PANEL    Narrative: The following orders were created for panel order Thrombosis Panel  Procedure                               Abnormality         Status                     ---------                               -----------         ------                     Antithrombin III Activity[[id number]]                        In process                 Cardiolipin antibody[[id number]]                             In process                 Factor 5 leiden[[id number]]                                  In process                 Lupus anticoagulant[[id number]]                              In process                 Protein C activity[[id number]]                               In process                 Protein S activity[[id number]]                               In process                 Protein S Antigen, Total  Sony Baize [[id number]]                      In process                 Prothrombin gene mutation[[id number]]                        In process                 Beta-2 glycoprotein anti  Donnelly Baize Donnelly Baize [[id number]]                      In process                   Please view results for these tests on the individual orders       Time reflects when diagnosis was documented in both MDM as applicable and the Disposition within this note     Time User Action Codes Description Comment    12/5/2019  6:42 PM Reagan Glaser Add [O15 2] Eclampsia, postpartum     12/5/2019  7:13 PM Reagan Glaser Remove [O15 2] Eclampsia, postpartum     12/5/2019 7:13 PM Reagan Glaser Add [G08] Thrombosis of superior sagittal sinus     2019  8:05 PM Venida Imelda Add [R56 9] Seizure ([address] )     2019  8:05 PM Venida Imelda Add [R53 1] Left-sided weakness       ED Disposition     ED Disposition Condition Date/Time Comment    Admit  u Dec 5, 2019  7:30 PM Admitting Physician: Sukhi Moreno [[id number]]   Level of Care: Critical Care [15]        Follow-up Information    None       Current Discharge Medication List      CONTINUE these medications which have NOT CHANGED    Details   acetaminophen (TYLENOL) 325 mg tablet Take 2 tablets (650 mg total) by mouth every 4 (four) hours as needed for mild pain  Qty: 30 tablet, Refills: 0    Associated Diagnoses: Status post repeat low transverse  section      cyclobenzaprine (FLEXERIL) 10 mg tablet Take 1 tablet (10 mg total) by mouth 3 (three) times a day as needed for muscle spasms  Qty: 9 tablet, Refills: 1    Associated Diagnoses: Status post repeat low transverse  section      docusate sodium (COLACE) 100 mg capsule Take 1 capsule (100 mg total) by mouth 2 (two) times a day  Qty: 10 capsule, Refills: 0    Associated Diagnoses: Status post repeat low transverse  section      ibuprofen (MOTRIN) 600 mg tablet Take 1 tablet (600 mg total) by mouth every 6 (six) hours as needed (cramping)  Qty: 30 tablet, Refills: 0    Associated Diagnoses: Status post repeat low transverse  section      oxyCODONE-acetaminophen (PERCOCET) 5-325 mg per tablet Take 1 tablet by mouth every 6 (six) hours as needed for severe pain for up to 10 daysMax Daily Amount: 4 tablets  Qty: 15 tablet, Refills: 0    Associated Diagnoses: Status post repeat low transverse  section      predniSONE 10 mg tablet Take 4 tablets (40 mg total) by mouth daily for 4 days Then 3 tabs/day for 5 days, then 2 tabs/day for 5 days, then 1 tab/day for 5 days, 1tab/every 2 days for 4 days  Qty: 48 tablet, Refills: 0    Associated Diagnoses: Status post repeat low transverse  section      Prenatal MV & Min w/FA-DHA (PRENATAL ADULT GUMMY/DHA/FA PO) Take by mouth daily           No discharge procedures on file  Prior to Admission Medications   Prescriptions Last Dose Informant Patient Reported? Taking? Prenatal MV & Min w/FA-DHA (PRENATAL ADULT GUMMY/DHA/FA PO) Past Week at Unknown time Self Yes Yes   Sig: Take by mouth daily   acetaminophen (TYLENOL) 325 mg tablet 2019 at Unknown time  No Yes   Sig: Take 2 tablets (650 mg total) by mouth every 4 (four) hours as needed for mild pain   cyclobenzaprine (FLEXERIL) 10 mg tablet 2019 at Unknown time  No Yes   Sig: Take 1 tablet (10 mg total) by mouth 3 (three) times a day as needed for muscle spasms   docusate sodium (COLACE) 100 mg capsule Past Week at Unknown time  No Yes   Sig: Take 1 capsule (100 mg total) by mouth 2 (two) times a day   ibuprofen (MOTRIN) 600 mg tablet Past Week at Unknown time  No Yes   Sig: Take 1 tablet (600 mg total) by mouth every 6 (six) hours as needed (cramping)   oxyCODONE-acetaminophen (PERCOCET) 5-325 mg per tablet 2019 at Unknown time  No Yes   Sig: Take 1 tablet by mouth every 6 (six) hours as needed for severe pain for up to 10 daysMax Daily Amount: 4 tablets   predniSONE 10 mg tablet 2019 at Unknown time  No Yes   Sig: Take 4 tablets (40 mg total) by mouth daily for 4 days Then 3 tabs/day for 5 days, then 2 tabs/day for 5 days, then 1 tab/day for 5 days, 1tab/every 2 days for 4 days      Facility-Administered Medications: None       Portions of the record may have been created with voice recognition software  Occasional wrong word or "sound a like" substitutions may have occurred due to the inherent limitations of voice recognition software  Read the chart carefully and recognize, using context, where substitutions have occurred      Electronically signed by:  Javier Cardona

## 2019-12-05 NOTE — PROGRESS NOTES
MICU Attending Admit Note    21yo  POD6 from repeat elective CS presented to ED post grand mal seizure event  I discussed with patient, family as well as OB resident prior history and course  Noted no history of HTN or prior seizure events  Seen for post-op visit today and without HTN  She did have spinal anesthesia with post-spinal HA but did not require blood patch  Had HA yesterday  Noted some left arm numbness today that is resolving  Per ED report had left sided paralysis on presentation  Seizure witnessed, no incontinence, + post ictal state, + tongue laceration  EDC reviewed - given 6gm mag, 2gm/hr infusion, 20mg labetolol, and 2mg ativan    VS reviewed  Exam  GEN young female, in bed, conversant nontoxic  HEENT MM dry, dry lips, +mild tongue laceration  NECK supple, no adenopathy, JVP not elevated  CV reg, single s1/2, no m/r  Pulm clear, no wheeze or rales  ABD +BS soft NTND, no rebound  EXT no edema, warm, no rash  NEURO - EOMI, PERRL, no nystagmus, no facial droop, 5/5 MS RUE/RLE/LLE, 4+/5 LUE strength and improving, normal sensation, normal tone, intact reflexes    Labs: I have personally reviewed pertinent lab results  Results from last 7 days   Lab Units 19  1700 19  0726 19  0848   WBC Thousand/uL 8 44 6 81 6 59   HEMOGLOBIN g/dL 11 0* 9 2* 12 1   HEMATOCRIT % 34 7* 29 0* 37 5   PLATELETS Thousands/uL 280 189 239   NEUTROS PCT % 86* 72  --    MONOS PCT % 2* 8  --    EOS PCT % 1 0  --       Results from last 7 days   Lab Units 19  1700   SODIUM mmol/L 135*   POTASSIUM mmol/L 5 0   CHLORIDE mmol/L 108   CO2 mmol/L 22   BUN mg/dL 14   CREATININE mg/dL 0 75   CALCIUM mg/dL 8 7   ALK PHOS U/L 129*   ALT U/L 26   AST U/L 45       RADIOGRAPHS  CT head/neck w and w/o contrast - formal read pending, no acute mass, bleed or shift, no evidence of dissection d/w ED staff no evidence of cavernous sinus thrombosis - awaiting formal report    Assessment & Plan  1   Seizure - suspected eclampsia but ruling out other etiologies  2  Post-Partum HTN - possible eclampsia  3  POD 6  Section  4   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     PLAN  · Admit to MICU, anticipate >2 midnight stay  · Goal BP <140/90, will redose prn labetolol 20mg now and escalate dosing as needed and may need gtt or hydralazine if not controlled  · Continue magnesium infusion with serial reflex checks/neuro checks  · Serial neuro exams and seizure precautions  · If further seizures occur consider cEEG  · D/W OB restarting prednisone taper in the AM  · Check UDS  · Follow up CT head/neck radiology read  · SCDs alone for now    Addendum:  1941 - contacted by ED attending - CT read c/w super sagittal sinus thrombosis    · Will start UFH bolus and gtt now  · Continue serial neuro exams  · Consult neurology in AM and will need MRV in AM  · Future hypercoagulable work up  · Continue eclampsia therapy for now    Negrito Santoyo,

## 2019-12-05 NOTE — ED NOTES
Pt stating she feels a seizure coming, OB made aware, V O   Ativan 2mg IV given     Conchita Ram, CALIXTO  12/05/19 9675

## 2019-12-05 NOTE — PROGRESS NOTES
Assessment/Plan:      Diagnoses and all orders for this visit:    Post-operative state    Postpartum state    Postpartum spinal headache          Subjective:     Patient ID: Dinorah Ward is a 21 y o  female  Patient is a 26-year-old female, here with her partner and baby, para 2, status post repeat low-transverse  1 week ago and a spinal headache  Review of systems is negative for fevers heavy vaginal bleeding pelvic or abdominal pain  Patient states that her spinal headache is positional and it is improving over time  Patient denies speech difficulties or weakness  Patient denies feeling depressed anxious or overwhelmed  Review of Systems   Constitutional: Negative for fever  Eyes: Negative for photophobia and visual disturbance  Gastrointestinal: Negative for abdominal pain, nausea and vomiting  Pfannenstiel incision is clean dry and intact  Genitourinary: Negative for pelvic pain  Neurological: Positive for headaches  Negative for seizures, syncope, facial asymmetry, speech difficulty, weakness and numbness  Cranial nerves 2-12 intact  Psychiatric/Behavioral: Negative for confusion and dysphoric mood  The patient is not nervous/anxious            Objective:     Physical Exam

## 2019-12-06 PROBLEM — G08 THROMBOSIS OF SUPERIOR SAGITTAL SINUS: Status: ACTIVE | Noted: 2019-12-06

## 2019-12-06 PROBLEM — R56.9 SEIZURE (HCC): Status: ACTIVE | Noted: 2019-12-06

## 2019-12-06 LAB
ALBUMIN SERPL BCP-MCNC: 2 G/DL (ref 3.5–5)
ALBUMIN SERPL BCP-MCNC: 2.4 G/DL (ref 3.5–5)
ALBUMIN SERPL BCP-MCNC: 2.5 G/DL (ref 3.5–5)
ALP SERPL-CCNC: 118 U/L (ref 46–116)
ALP SERPL-CCNC: 122 U/L (ref 46–116)
ALP SERPL-CCNC: 126 U/L (ref 46–116)
ALT SERPL W P-5'-P-CCNC: 20 U/L (ref 12–78)
ALT SERPL W P-5'-P-CCNC: 21 U/L (ref 12–78)
ALT SERPL W P-5'-P-CCNC: 21 U/L (ref 12–78)
AMORPH URATE CRY URNS QL MICRO: ABNORMAL /HPF
ANION GAP SERPL CALCULATED.3IONS-SCNC: 5 MMOL/L (ref 4–13)
ANION GAP SERPL CALCULATED.3IONS-SCNC: 7 MMOL/L (ref 4–13)
ANION GAP SERPL CALCULATED.3IONS-SCNC: 8 MMOL/L (ref 4–13)
APTT PPP: 116 SECONDS (ref 23–37)
APTT PPP: 57 SECONDS (ref 23–37)
APTT PPP: >210 SECONDS (ref 23–37)
AST SERPL W P-5'-P-CCNC: 14 U/L (ref 5–45)
ATRIAL RATE: 108 BPM
BACTERIA UR QL AUTO: ABNORMAL /HPF
BILIRUB SERPL-MCNC: 0.13 MG/DL (ref 0.2–1)
BILIRUB SERPL-MCNC: 0.19 MG/DL (ref 0.2–1)
BILIRUB SERPL-MCNC: 0.21 MG/DL (ref 0.2–1)
BILIRUB UR QL STRIP: NEGATIVE
BUN SERPL-MCNC: 10 MG/DL (ref 5–25)
BUN SERPL-MCNC: 8 MG/DL (ref 5–25)
BUN SERPL-MCNC: 8 MG/DL (ref 5–25)
CALCIUM SERPL-MCNC: 7.2 MG/DL (ref 8.3–10.1)
CALCIUM SERPL-MCNC: 7.3 MG/DL (ref 8.3–10.1)
CALCIUM SERPL-MCNC: 7.5 MG/DL (ref 8.3–10.1)
CHLORIDE SERPL-SCNC: 107 MMOL/L (ref 100–108)
CHLORIDE SERPL-SCNC: 108 MMOL/L (ref 100–108)
CHLORIDE SERPL-SCNC: 109 MMOL/L (ref 100–108)
CHOLEST SERPL-MCNC: 206 MG/DL (ref 50–200)
CLARITY UR: ABNORMAL
CO2 SERPL-SCNC: 23 MMOL/L (ref 21–32)
CO2 SERPL-SCNC: 24 MMOL/L (ref 21–32)
CO2 SERPL-SCNC: 25 MMOL/L (ref 21–32)
COLOR UR: YELLOW
CREAT SERPL-MCNC: 0.53 MG/DL (ref 0.6–1.3)
CREAT SERPL-MCNC: 0.54 MG/DL (ref 0.6–1.3)
CREAT SERPL-MCNC: 0.56 MG/DL (ref 0.6–1.3)
ERYTHROCYTE [DISTWIDTH] IN BLOOD BY AUTOMATED COUNT: 13.8 % (ref 11.6–15.1)
ERYTHROCYTE [DISTWIDTH] IN BLOOD BY AUTOMATED COUNT: 14 % (ref 11.6–15.1)
ERYTHROCYTE [DISTWIDTH] IN BLOOD BY AUTOMATED COUNT: 14 % (ref 11.6–15.1)
EST. AVERAGE GLUCOSE BLD GHB EST-MCNC: 117 MG/DL
GFR SERPL CREATININE-BSD FRML MDRD: 130 ML/MIN/1.73SQ M
GFR SERPL CREATININE-BSD FRML MDRD: 132 ML/MIN/1.73SQ M
GFR SERPL CREATININE-BSD FRML MDRD: 132 ML/MIN/1.73SQ M
GLUCOSE SERPL-MCNC: 100 MG/DL (ref 65–140)
GLUCOSE SERPL-MCNC: 88 MG/DL (ref 65–140)
GLUCOSE SERPL-MCNC: 97 MG/DL (ref 65–140)
GLUCOSE UR STRIP-MCNC: NEGATIVE MG/DL
HBA1C MFR BLD: 5.7 % (ref 4.2–6.3)
HCT VFR BLD AUTO: 32.1 % (ref 34.8–46.1)
HCT VFR BLD AUTO: 32.5 % (ref 34.8–46.1)
HCT VFR BLD AUTO: 35.2 % (ref 34.8–46.1)
HDLC SERPL-MCNC: 39 MG/DL
HGB BLD-MCNC: 10.1 G/DL (ref 11.5–15.4)
HGB BLD-MCNC: 10.4 G/DL (ref 11.5–15.4)
HGB BLD-MCNC: 11.1 G/DL (ref 11.5–15.4)
HGB UR QL STRIP.AUTO: ABNORMAL
KETONES UR STRIP-MCNC: NEGATIVE MG/DL
LDLC SERPL CALC-MCNC: 126 MG/DL (ref 0–100)
LEUKOCYTE ESTERASE UR QL STRIP: ABNORMAL
MAGNESIUM SERPL-MCNC: 4.8 MG/DL (ref 1.6–2.6)
MAGNESIUM SERPL-MCNC: 5 MG/DL (ref 1.6–2.6)
MCH RBC QN AUTO: 27.8 PG (ref 26.8–34.3)
MCH RBC QN AUTO: 28.2 PG (ref 26.8–34.3)
MCH RBC QN AUTO: 28.3 PG (ref 26.8–34.3)
MCHC RBC AUTO-ENTMCNC: 31.1 G/DL (ref 31.4–37.4)
MCHC RBC AUTO-ENTMCNC: 31.5 G/DL (ref 31.4–37.4)
MCHC RBC AUTO-ENTMCNC: 32.4 G/DL (ref 31.4–37.4)
MCV RBC AUTO: 88 FL (ref 82–98)
MCV RBC AUTO: 88 FL (ref 82–98)
MCV RBC AUTO: 91 FL (ref 82–98)
NITRITE UR QL STRIP: NEGATIVE
NON-SQ EPI CELLS URNS QL MICRO: ABNORMAL /HPF
NONHDLC SERPL-MCNC: 167 MG/DL
P AXIS: 44 DEGREES
PH UR STRIP.AUTO: 5.5 [PH]
PHOSPHATE SERPL-MCNC: 3.7 MG/DL (ref 2.7–4.5)
PLATELET # BLD AUTO: 240 THOUSANDS/UL (ref 149–390)
PLATELET # BLD AUTO: 266 THOUSANDS/UL (ref 149–390)
PLATELET # BLD AUTO: 290 THOUSANDS/UL (ref 149–390)
PMV BLD AUTO: 9.8 FL (ref 8.9–12.7)
POTASSIUM SERPL-SCNC: 3.2 MMOL/L (ref 3.5–5.3)
POTASSIUM SERPL-SCNC: 3.2 MMOL/L (ref 3.5–5.3)
POTASSIUM SERPL-SCNC: 3.9 MMOL/L (ref 3.5–5.3)
PR INTERVAL: 128 MS
PROT SERPL-MCNC: 7.6 G/DL (ref 6.4–8.2)
PROT SERPL-MCNC: 7.8 G/DL (ref 6.4–8.2)
PROT SERPL-MCNC: 8.1 G/DL (ref 6.4–8.2)
PROT UR STRIP-MCNC: NEGATIVE MG/DL
QRS AXIS: 21 DEGREES
QRSD INTERVAL: 88 MS
QT INTERVAL: 312 MS
QTC INTERVAL: 418 MS
RBC # BLD AUTO: 3.58 MILLION/UL (ref 3.81–5.12)
RBC # BLD AUTO: 3.67 MILLION/UL (ref 3.81–5.12)
RBC # BLD AUTO: 4 MILLION/UL (ref 3.81–5.12)
RBC #/AREA URNS AUTO: ABNORMAL /HPF
SODIUM SERPL-SCNC: 137 MMOL/L (ref 136–145)
SODIUM SERPL-SCNC: 139 MMOL/L (ref 136–145)
SODIUM SERPL-SCNC: 140 MMOL/L (ref 136–145)
SP GR UR STRIP.AUTO: 1.01 (ref 1–1.03)
T WAVE AXIS: 5 DEGREES
TRIGL SERPL-MCNC: 203 MG/DL
UROBILINOGEN UR QL STRIP.AUTO: 0.2 E.U./DL
VENTRICULAR RATE: 108 BPM
WBC # BLD AUTO: 5.81 THOUSAND/UL (ref 4.31–10.16)
WBC # BLD AUTO: 6.72 THOUSAND/UL (ref 4.31–10.16)
WBC # BLD AUTO: 8.69 THOUSAND/UL (ref 4.31–10.16)
WBC #/AREA URNS AUTO: ABNORMAL /HPF

## 2019-12-06 PROCEDURE — 97166 OT EVAL MOD COMPLEX 45 MIN: CPT

## 2019-12-06 PROCEDURE — G8989 SELF CARE D/C STATUS: HCPCS

## 2019-12-06 PROCEDURE — 81001 URINALYSIS AUTO W/SCOPE: CPT | Performed by: INTERNAL MEDICINE

## 2019-12-06 PROCEDURE — G8979 MOBILITY GOAL STATUS: HCPCS

## 2019-12-06 PROCEDURE — 85027 COMPLETE CBC AUTOMATED: CPT | Performed by: OBSTETRICS & GYNECOLOGY

## 2019-12-06 PROCEDURE — 84100 ASSAY OF PHOSPHORUS: CPT | Performed by: EMERGENCY MEDICINE

## 2019-12-06 PROCEDURE — 97116 GAIT TRAINING THERAPY: CPT

## 2019-12-06 PROCEDURE — 93010 ELECTROCARDIOGRAM REPORT: CPT | Performed by: INTERNAL MEDICINE

## 2019-12-06 PROCEDURE — 97163 PT EVAL HIGH COMPLEX 45 MIN: CPT

## 2019-12-06 PROCEDURE — G8987 SELF CARE CURRENT STATUS: HCPCS

## 2019-12-06 PROCEDURE — 80053 COMPREHEN METABOLIC PANEL: CPT | Performed by: OBSTETRICS & GYNECOLOGY

## 2019-12-06 PROCEDURE — 83036 HEMOGLOBIN GLYCOSYLATED A1C: CPT | Performed by: EMERGENCY MEDICINE

## 2019-12-06 PROCEDURE — 99255 IP/OBS CONSLTJ NEW/EST HI 80: CPT | Performed by: PSYCHIATRY & NEUROLOGY

## 2019-12-06 PROCEDURE — G8978 MOBILITY CURRENT STATUS: HCPCS

## 2019-12-06 PROCEDURE — 83874 ASSAY OF MYOGLOBIN: CPT | Performed by: INTERNAL MEDICINE

## 2019-12-06 PROCEDURE — 83735 ASSAY OF MAGNESIUM: CPT | Performed by: EMERGENCY MEDICINE

## 2019-12-06 PROCEDURE — 99232 SBSQ HOSP IP/OBS MODERATE 35: CPT | Performed by: INTERNAL MEDICINE

## 2019-12-06 PROCEDURE — 99232 SBSQ HOSP IP/OBS MODERATE 35: CPT | Performed by: OBSTETRICS & GYNECOLOGY

## 2019-12-06 PROCEDURE — NC001 PR NO CHARGE: Performed by: INTERNAL MEDICINE

## 2019-12-06 PROCEDURE — G8988 SELF CARE GOAL STATUS: HCPCS

## 2019-12-06 PROCEDURE — 85730 THROMBOPLASTIN TIME PARTIAL: CPT | Performed by: INTERNAL MEDICINE

## 2019-12-06 PROCEDURE — 80061 LIPID PANEL: CPT | Performed by: EMERGENCY MEDICINE

## 2019-12-06 RX ORDER — PREDNISONE 20 MG/1
40 TABLET ORAL DAILY
Status: DISCONTINUED | OUTPATIENT
Start: 2019-12-06 | End: 2019-12-11 | Stop reason: HOSPADM

## 2019-12-06 RX ORDER — POTASSIUM CHLORIDE AND SODIUM CHLORIDE 900; 300 MG/100ML; MG/100ML
50 INJECTION, SOLUTION INTRAVENOUS CONTINUOUS
Status: DISCONTINUED | OUTPATIENT
Start: 2019-12-06 | End: 2019-12-06

## 2019-12-06 RX ORDER — POTASSIUM CHLORIDE 14.9 MG/ML
20 INJECTION INTRAVENOUS
Status: COMPLETED | OUTPATIENT
Start: 2019-12-06 | End: 2019-12-06

## 2019-12-06 RX ADMIN — CHLORHEXIDINE GLUCONATE 0.12% ORAL RINSE 15 ML: 1.2 LIQUID ORAL at 20:40

## 2019-12-06 RX ADMIN — HEPARIN SODIUM 4200 UNITS: 1000 INJECTION INTRAVENOUS; SUBCUTANEOUS at 20:26

## 2019-12-06 RX ADMIN — SODIUM CHLORIDE 75 ML/HR: 0.9 INJECTION, SOLUTION INTRAVENOUS at 04:34

## 2019-12-06 RX ADMIN — SODIUM CHLORIDE 75 ML/HR: 0.9 INJECTION, SOLUTION INTRAVENOUS at 00:21

## 2019-12-06 RX ADMIN — MAGNESIUM SULFATE HEPTAHYDRATE 2 G/HR: 40 INJECTION, SOLUTION INTRAVENOUS at 05:49

## 2019-12-06 RX ADMIN — HEPARIN SODIUM AND DEXTROSE 12 UNITS/KG/HR: 10000; 5 INJECTION INTRAVENOUS at 16:56

## 2019-12-06 RX ADMIN — ACETAMINOPHEN 650 MG: 325 TABLET ORAL at 19:16

## 2019-12-06 RX ADMIN — SODIUM CHLORIDE 500 ML: 0.9 INJECTION, SOLUTION INTRAVENOUS at 01:51

## 2019-12-06 RX ADMIN — LEVETIRACETAM 750 MG: 100 INJECTION, SOLUTION INTRAVENOUS at 20:40

## 2019-12-06 RX ADMIN — PREDNISONE 40 MG: 20 TABLET ORAL at 16:45

## 2019-12-06 RX ADMIN — ACETAMINOPHEN 650 MG: 325 TABLET ORAL at 03:33

## 2019-12-06 RX ADMIN — POTASSIUM CHLORIDE 20 MEQ: 14.9 INJECTION, SOLUTION INTRAVENOUS at 05:46

## 2019-12-06 RX ADMIN — POTASSIUM CHLORIDE 20 MEQ: 14.9 INJECTION, SOLUTION INTRAVENOUS at 03:11

## 2019-12-06 RX ADMIN — LEVETIRACETAM 750 MG: 100 INJECTION, SOLUTION INTRAVENOUS at 09:33

## 2019-12-06 RX ADMIN — ACETAMINOPHEN 650 MG: 325 TABLET ORAL at 14:02

## 2019-12-06 NOTE — ED NOTES
Patient care handoff occurred at this time  Report given by Juvenal Hanna RN  Full report to be given to MICU RN by previous ED CALIXTO Jiménez RN  12/05/19 0923

## 2019-12-06 NOTE — UTILIZATION REVIEW
Initial Clinical Review    Admission: Date/Time/Statement: Inpatient Admission Orders (From admission, onward)     Ordered        19 1708  Inpatient Admission  Once                   Orders Placed This Encounter   Procedures    Inpatient Admission     Standing Status:   Standing     Number of Occurrences:   1     Order Specific Question:   Admitting Physician     Answer:   April Garrido     Order Specific Question:   Level of Care     Answer:   Critical Care [15]     Order Specific Question:   Estimated length of stay     Answer:   More than 2 Midnights     Order Specific Question:   Certification     Answer:   I certify that inpatient services are medically necessary for this patient for a duration of greater than two midnights  See H&P and MD Progress Notes for additional information about the patient's course of treatment  ED Arrival Information     Expected Arrival Acuity Means of Arrival Escorted By Service Admission Type    - 2019 16:23 [facility]gent [facility] Critical Care/ICU Emergency    Arrival Complaint    Seizures        Chief Complaint   Patient presents with    Seizure - New Onset     Pt had a seizure lasting 2 minutes per boyfriend  Pt is 6 days postpartem     Assessment/Plan:   24y Female  to ED presents with [address] mal seizure, POD #6 for elective Caesarean section  Pt was at home, normal state, holding her child and began with left arm numbness  Later she began with jerking of her neck, put her child down, helped to lay down by significant other and began convulsing  Pt was postictal, on incontinence but did bite her tongue  She did have spinal anesthesia and had headache yesterday which improved with Fioricet  PMH of  alloimmune thrombocytopenia  In ED given 10 gm of magnesium and started on a magnesium infusion at 2 grams/hour, she was given 20 mg of labetalol for hypertensive emergency along with 2 mg of Ativan    Admit Inpatient level of care for Witnessed grand mal seizure- superior sagittal sinus thrombosis versus eclampsia of verses press syndrome, Superior sagittal sinus thrombosis- possibly related to hypercoagulable state of pregnancy versus underlying primary hypercoagulable disorder, Postpartum hypertensive emergency- likely related to cerebral dural thrombosis, POD #6 for , Alloimmune thrombocytopenia- on a prednisone taper  Completed 60 mg daily of prednisone during the last 6 weeks of pregnancy  Currently at 40 mg daily, Left upper extremity and Occipital headache  Magnesium gtt, Ativan prn, Heparin gtt for Superior sagittal sinus thrombosis and Neurology consult  MRI/MRA  Continue labetalol prn - Goal Systolic B/P <886  Check UA for proteinuria  Borderline hyponatremia - avoid IVF while on Mag gtt and repeat BMP in am     Progress notes; Patient has quite noticeable left-sided pronator drift, with 4/5 strength of the left upper and lower extremities  She has subjective numbness of the left upper extremity  She has a slightly depressed mental status, and mild confusion, however GCS is still 14  NIH score is 4Continue magnesium infusion for now, however will discontinue p r n  antihypertensives and allow permissive hypertension should her blood pressure increase, with a goal SBP < 180  Check mag, Give IVF  ml bolus and IVFs  Continue heparin gtt, start Keppra x7 days, swallow eval in am and OB consult  Frequent neuro checks  NPO with sips   Neurology cons;  Etiology - hypercoagulable state secondary to pregnancy, rule out underlying hypercoagulable state, ? precclampsia, rule out underlying autoimmune etiologyContinue Heparin drip, CT of head in a m , to assess stability of MRI findings, sooner CT of head with changes in neurological examination  Continue Keppra at current dosing, no need for EEG at this time, will need to continue Keppra dosing upon discharge  Blood pressure control   Thrombosis panel pending, as well as rheumatological screening  OB GYN is following as well, reviewed notes/encounters    ED Triage Vitals   Temperature Pulse Respirations Blood Pressure SpO2   12/05/19 1634 12/05/19 1634 12/05/19 1634 12/05/19 1634 12/05/19 1634   99 4 °F (37 4 °C) (!) 126 18 162/97 98 %      Temp Source Heart Rate Source Patient Position - Orthostatic VS BP Location FiO2 (%)   12/05/19 1634 12/05/19 1634 12/05/19 1634 12/05/19 1634 --   Oral Monitor Sitting Right arm       Pain Score       12/05/19 1630       No Pain        Wt Readings from Last 1 Encounters:   12/05/19 106 kg (233 lb 0 4 oz)     Additional Vital Signs:   12/06/19 1000    102    148/85  101         12/06/19 0600    80    137/91  105  97 %       12/06/19 0500    74    128/81  103  99 %       12/06/19 0415  98 °F (36 7 °C)  84  16  121/78  92  99 %  None (Room air)       12/06/19 0005  98 3 °F (36 8 °C)  88  18  140/85  100  97 %  None (Room air)  Lying   12/05/19 2000    102  23Abnormal   168/92  114  98 %       12/05/19 1922    107Abnormal   18  142/83    98 %  None (Room air)  Sitting   12/05/19 1830    103  16  141/87    96 %  None (Room air)  Lying   12/05/19 1800    105  16  134/86    97 %  None (Room air)     12/05/19 1730    96  12  162/106Abnormal     97 %         Pertinent Labs/Diagnostic Test Results:   12/5 CTA Head and Neck - Superior sagittal sinus thrombosis  Contrast-enhanced brain MRI with bravo sequence recommended for further evaluation      12/5 MRI Brain -  Redemonstration of thrombus in the superior sagittal sinus  Bilateral cortical veins along the posterior aspect of the proximal convexities are also thrombosed  Significant venous congestion within smaller draining veins along the bilateral parietal convexities, greater on the right    Minimally restricted diffusion in the perirolandic region bilaterally, though more prominent on the right, additionally with mild gyral swelling and T2 hyperintensity in the right central and precentral gyri  Findings suggestive of vasogenic edema and ischemia without clear evidence of infarct secondary to venous sinus and cortical vein thrombosis  No evidence of hemorrhage  Focal mild gyral swelling in the right parietal region  No generalized cerebral edema or significant mass effect      12/5 MRV Head -  Stable thrombus in the mid and posterior aspects of the superior sagittal sinus    Thrombosis of bilateral cortical veins along the posterior parietal convexity      Results from last 7 days   Lab Units 12/06/19  1018 12/06/19  0446 12/06/19  0013 12/05/19  1700 11/30/19  0726   WBC Thousand/uL 5 81 6 72 8 69 8 44 6 81   HEMOGLOBIN g/dL 11 1* 10 1* 10 4* 11 0* 9 2*   HEMATOCRIT % 35 2 32 5* 32 1* 34 7* 29 0*   PLATELETS Thousands/uL 290 240 266 280 189   NEUTROS ABS Thousands/µL  --   --   --  7 26 4 81         Results from last 7 days   Lab Units 12/06/19  1018 12/06/19  0446 12/06/19  0150 12/06/19  0013 12/05/19  1700   SODIUM mmol/L 139 137  --  140 135*   POTASSIUM mmol/L 3 9 3 2*  --  3 2* 5 0   CHLORIDE mmol/L 109* 107  --  108 108   CO2 mmol/L 23 25  --  24 22   ANION GAP mmol/L 7 5  --  8 5   BUN mg/dL 8 8  --  10 14   CREATININE mg/dL 0 53* 0 54*  --  0 56* 0 75   EGFR ml/min/1 73sq m 132 132  --  130 111   CALCIUM mg/dL 7 3* 7 2*  --  7 5* 8 7   MAGNESIUM mg/dL  --  5 0* 4 8*  --   --    PHOSPHORUS mg/dL  --  3 7  --   --   --      Results from last 7 days   Lab Units 12/06/19  1018 12/06/19  0446 12/06/19  0013 12/05/19  1700   AST U/L 14 14 14 45   ALT U/L 20 21 21 26   ALK PHOS U/L 126* 118* 122* 129*   TOTAL PROTEIN g/dL 8 1 7 6 7 8 8 6*   ALBUMIN g/dL 2 5* 2 0* 2 4* 2 1*   TOTAL BILIRUBIN mg/dL 0 19* 0 21 0 13* 0 38         Results from last 7 days   Lab Units 12/06/19  1018 12/06/19  0446 12/06/19  0013 12/05/19  1700   GLUCOSE RANDOM mg/dL 88 100 97 94         Results from last 7 days   Lab Units 12/06/19  0446   HEMOGLOBIN A1C % 5 7   EAG mg/dl 117     Results from last 7 days   Lab Units 19  1018 19  0150 19  2109   PROTIME seconds  --   --  13 8   INR   --   --  1 10   PTT seconds 116* >210* 156*       Results from last 7 days   Lab Units 19  1720   CLARITY UA  Cloudy   COLOR UA  Yellow   SPEC GRAV UA  1 019   PH UA  5 5   GLUCOSE UA mg/dl Negative   KETONES UA mg/dl Negative   BLOOD UA  Large*   PROTEIN UA mg/dl 100 (2+)*   NITRITE UA  Negative   BILIRUBIN UA  Negative   UROBILINOGEN UA E U /dl 0 2   LEUKOCYTES UA  Negative   WBC UA /hpf 4-10*   RBC UA /hpf None Seen   BACTERIA UA /hpf Occasional   EPITHELIAL CELLS WET PREP /hpf Occasional   CREATININE UR mg/dL 79 7   PROTEIN UR mg/dL 110   PROT/CREAT RATIO UR  1 38*     ED Treatment:   Medication Administration from 2019 1623 to 2019 2042       Date/Time Order Dose Route Action     2019 1705 magnesium sulfate 4 g/100 mL IVPB (premix) 4 g 4 g Intravenous New Bag     2019 1725 magnesium sulfate 2 g/50 mL IVPB (premix) 2 g 2 g Intravenous New Bag     2019 1736 magnesium sulfate 20 g/500 mL infusion (premix) 2 g/hr Intravenous New Bag     2019 1659 Labetalol HCl (NORMODYNE) injection 20 mg 20 mg Intravenous Given     2019 1715 LORazepam (ATIVAN) 2 mg/mL injection 2 mg 2 mg Intravenous Given     2019 1745 Labetalol HCl (NORMODYNE) injection 40 mg 40 mg Intravenous Given     2019 1755 iohexol (OMNIPAQUE) 350 MG/ML injection (MULTI-DOSE) 85 mL 85 mL Intravenous Given     2019 heparin (porcine) injection 8,400 Units 8,400 Units Intravenous Given     2019 heparin (porcine) 25,000 units in 250 mL infusion (premix) 18 Units/kg/hr Intravenous New Bag        Past Medical History:   Diagnosis Date     alloimmune thrombocytopenia     Obesity complicating pregnancy in second trimester 2019    Varicella     childhood     Present on Admission:   Thrombosis of superior sagittal sinus      Admitting Diagnosis:  Thrombosis of superior sagittal sinus [G08]  Seizure ([address] ) [R56 9]  Seizures (HCC) [R56 9]  Left-sided weakness [R53 1]  Age/Sex: 21 y o  female     Admission Orders:  Neurological checks q2h    Scheduled Medications:  Medications:  chlorhexidine 15 mL [employer]it Q12H [address]   levETIRAcetam 750 mg Intravenous Q12H [address]     Continuous IV Infusions:  heparin (porcine) 3-30 Units/kg/hr (Order-Specific) Intravenous Titrated   magnesium sulfate 2 g/hr Intravenous Continuous   sodium chloride 75 mL/hr Intravenous Continuous     PRN Meds:  acetaminophen 650 mg Oral Q4H PRN  12/5 x1, 12/6 x1   heparin (porcine) 4,200 Units Intravenous PRN   heparin (porcine) 8,400 Units Intravenous PRN     Network Utilization Review Department  [email or web address]  ATTENTION: Please call with any questions or concerns to [phone] and carefully listen to the prompts so that you are directed to the right person  All voicemails are confidential   Abida Luna all requests for admission clinical reviews, approved or denied determinations and any other requests to dedicated fax number below belonging to the campus where the patient is receiving treatment   List of dedicated fax numbers for the Facilities:  [address] DENIALS (Administrative/Medical Necessity) [phone]   [address] [place] [address] (Maternity/NICU/Pediatrics) [phone]   Bekah Blanton [phone]   [facility] [phone]   Trav Heard [phone]   [address] [phone]   [facility]  [phone]   Olive Alarcon [address] [place] [address] [phone]

## 2019-12-06 NOTE — SOCIAL WORK
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, drive and employed  Pt has no DME  Pharmacy is [facility] in [place]  PCP is Dr Dennie Dimes  Pt denies hx with HHC, STR, alc, drug and IP psych tx  Pt has transportation when dc  CM reviewed d/c planning process including the following: identifying help at home, patient preference for d/c planning needs, Discharge Lounge, [facility] Meds to Bed program, availability of treatment team to discuss questions or concerns patient and/or family may have regarding understanding medications and recognizing signs and symptoms once discharged  CM also encouraged patient to follow up with all recommended appointments after discharge  Patient advised of importance for patient and family to participate in managing patients medical well being

## 2019-12-06 NOTE — PROGRESS NOTES
Patient evaluated at bedside  In short, she is a 21year old female  postpartum day 5 status post LTCS at 37 6 weeks to a single gestation male, who had been treated for headaches since discharge, initially thought to be secondary to post epidural puncture, admitted to the ICU for a new onset seizure, which was initially thought to be secondary to preeclampsia, however the patient also had left-sided weakness, with an NIH score noted to be 2, and CT scan was obtained, with CTV being significant for superior sagittal sinus thrombosis  Initially, her blood pressure was quite elevated, and she received labetalol, as well as Ativan that time  She was started empirically on a magnesium bolus followed by infusion, and urine protein creatinine ratio was elevated  -I discussed this case with the neurologist on call  Agreed with current management plan  They will evaluate the patient the morning  No further recommendations  -MRI/MRV significant for venous congestion proximal to a superior sagittal sinus thrombosis, greater on the right side, as well as thrombosis of the bilateral cortical veins with restricted diffusion bilaterally concerning for ischemia  There is also focal general swelling in the right parietal region    -on my re-evaluation, the patient has quite noticeable left-sided pronator drift, with 4/5 strength of the left upper and lower extremities  She has subjective numbness of the left upper extremity  She has a slightly depressed mental status, and mild confusion, however GCS is still 14  NIH score is 4      -at this point, I believe her initial seizure was more likely secondary to cerebral venous thrombosis versus preeclampsia     -Continue magnesium infusion for now, however will discontinue p r n  antihypertensives and allow permissive hypertension should her blood pressure increase, with a goal SBP < 180    -Will check magnesium   -give a 500 cc normal saline bolus, and continue normal sodium infusion  -She is currently on a heparin drip which is on hold due to elevated PTT reading    Will continue to trend, and titrate accordingly with goal PTT 60-90  -start Keppra for seizure prophylaxis for 7 day total course  -swallow evaluation in a m   -obstetrics was consulted in the emergency department; appreciate input  -PT/OT  -will continue to monitor, with critical neuro checks, and frequent re-evaluations

## 2019-12-06 NOTE — CONSULTS
Consultation - Neurology   Joseluis Smith 21 y o  female MRN: [id number]  [facility]: [id number]    Assessment/Plan   · Witnessed grand mal seizure - appears secondary superior sagittal sinus thrombus, as well as thrombosis of the bilateral cortical veins along the posterior convexity  MRI of the brain with minimal restricted diffusion - in the enhal-rolandic region bilaterally, more prominent on the right, with additional mild gyral swelling and t2 hyperintensity in the right central and precentral gyri, focal mild gyral swelling in the right parietal region as well  · Superior sagittal sinus thrombus, as well as thrombosis of the bilateral cortical veins along the posterior convexity  MRI of the brain with minimal restricted diffusion - in the nehal-rolandic region bilaterally, more prominent on the right, with additional mild gyral swelling and t2 hyperintensity in the right central and precentral gyri, focal mild gyral swelling in the right parietal region as well  Etiology - hypercoagulable state secondary to pregnancy, rule out underlying hypercoagulable state, ? precclampsia, rule out underlying autoimmune etiology  · HTN emergency  · Alloimmune thrombocytopenia  · Post-op caesarean section    -Reviewed plan of care with the ICU team, continued ICU supportive care, continue to monitor for underlying infectious and metabolic derangements, continue to treat blood pressure - reviewed blood pressure goals with ICU team, stat CT of head with any change in neurological condition/exam, frequent neurological checks  -Continue Heparin drip, CT of head in a m 12/7 , to assess stability of MRI findings, sooner CT of head with changes in neurological examination  - Continue Keppra at current dosing, no need for EEG at this time, will need to continue Keppra dosing upon discharge    - Blood pressure control  - Thrombosis panel pending, as well as rheumatological screening - as reviewed with the ICU team  - OB GYN is following as well, reviewed notes/encounters  - Notify neurology with any questions or concerns, updated family at bedside  History of Present Illness     Reason for Consult / Principal Problem: Thrombosis of the superior sagittal sinus  HPI: Wilfred Lambert is a 21 y o   female past medical history listed below, patient does have a history of  alloimmune thrombocytopenia, who is postop from elective  section  She presented to the ED after witnessed grand mal seizure  Per record review she has no history gestational diabetes, no history of preeclampsia, no history of hypertension  Per record review -    On the day of presentation, she was normal state of health, holding her child, around 11:00 a m  she began experiencing left arm numbness, at 3:00 p m  began to have jerking in her neck, she was able to put her child down she laid to the ground and she was noted to be convulsing  It was reported the patient postictal afterwards, she did have a tongue bite  It is reported from documentation that the patient has been treated for headaches since discharge initially thought to be secondary to post epidural puncture  In the ED -   The patient was given 20 mg of labetalol for hypertensive emergency, along with Ativan in the ED, along with magnesium  Patient was assessed by critical care, CTA head and  Neck with and with out contrast - showed a superior sagittal sinus thrombosis  Patient was seen by critical care - and admitted to the medical ICU, she is being treated for witnessed grand mal seizure, superior sagittal sinus thrombosis - patient was started on heparin drip, allo immune thrombocytopenia - she is currently on a prednisone taper, her occipital ha is being treated  The patient was also seen by ob gyn medicine - please see consult for details     Reviewed notes by ICU team in detail, the patient was also started on Keppra, plan per ICU team was seizure ppx for a 7 day total      MRV was completed yesterday which showed a stable thrombus in the mid and posterior aspects of the superior sagittal sinus, thrombosis the bilateral cortical veins posterior parietal convexity  MRI of the brain was completed which did review demonstrated thrombus in the superior sagittal sinus, bilateral cortical veins along the posterior aspect the proximal convexities are also thrombosed with significant venous congestion with small draining veins along the bilateral parietal convexities greater on the right  Minimal restricted diffusion in the perirolandic region bilaterally, though more prominently right, additionally with mild dural swelling in T2 hyperdensity in the right central and paracentral gyri, findings suggestive of vasogenic edema and ischemia with out clear evidence of infarct secondary to venous sinus and cortical vein thrombosis, no evidence of hemorrhage  Focal mild gryal swelling in the right parietal region  Today patient is doing well, she does reports some tingling of her left face, otherwise no new neurological complaints  She denies headache, chest pain, shortness of breath, palpitations, she denies problems with her vision, she denies problems with her speech, she denies problems with swallowing, she denies problems with one-sided weakness sensory loss or ataxia, other than stated above  12 point ROS are negative except per HPI      Inpatient consult to Neurology  Consult performed by: Radha Vega PA-C  Consult ordered by: Marian Gardner MD          Review of Systems  12 point ROS are negative except for HPI    Historical Information   Past Medical History:   Diagnosis Date     alloimmune thrombocytopenia     Obesity complicating pregnancy in second trimester 2019    Varicella     childhood     Past Surgical History:   Procedure Laterality Date     SECTION      CHOLECYSTECTOMY      2017    CHOLECYSTECTOMY LAPAROSCOPIC N/A 2017    Procedure: CHOLECYSTECTOMY LAPAROSCOPIC;  Surgeon: Lafe Gaucher, MD;  Location: [facility];  Service: General    OK  DELIVERY ONLY N/A 2019    Procedure: [place] () REPEAT;  Surgeon: Daniele Arredondo MD;  Location: [facility] ;  Service: Obstetrics     Social History   Social History     Substance and Sexual Activity   Alcohol Use No    Frequency: Never     Social History     Substance and Sexual Activity   Drug Use No     Social History     Tobacco Use   Smoking Status Former Smoker    Packs/day: 0 50   Smokeless Tobacco Never Used   Tobacco Comment    1-2     Family History:   Family History   Problem Relation Age of Onset    Arthritis Mother     Kidney disease Mother     Other Mother         thyroid, thalassemia, anemia    Diabetes Mother         type 2 insulin    Cancer Mother         lung, gist    Hypertension Father     No Known Problems Brother     Cancer Maternal Grandmother         lung    No Known Problems Son     Stroke Maternal Grandfather     Heart disease Maternal Grandfather         MI    No Known Problems Paternal Grandmother     Cancer Paternal Grandfather         brain    Diabetes Maternal Uncle         type 2 insulin     Meds/Allergies   all current active meds have been reviewed, current meds:   Current Facility-Administered Medications   Medication Dose Route Frequency    acetaminophen (TYLENOL) tablet 650 mg  650 mg Oral Q4H PRN    chlorhexidine (PERIDEX) 0 12 % oral rinse 15 mL  15 mL Swish & Spit Q12H [address]    heparin (porcine) 25,000 units in 250 mL infusion (premix)  3-30 Units/kg/hr (Order-Specific) Intravenous Titrated    heparin (porcine) injection 4,200 Units  4,200 Units Intravenous PRN    heparin (porcine) injection 8,400 Units  8,400 Units Intravenous PRN    levETIRAcetam (KEPPRA) 750 mg in sodium chloride 0 9 % 100 mL IVPB  750 mg Intravenous Q12H [address]    magnesium sulfate 20 g/500 mL infusion (premix)  2 g/hr Intravenous Continuous    potassium chloride 20 mEq IVPB (premix)  20 mEq Intravenous Q2H    sodium chloride 0 9 % infusion  75 mL/hr Intravenous Continuous    and PTA meds:   Prior to Admission Medications   Prescriptions Last Dose Informant Patient Reported? Taking? Prenatal MV & Min w/FA-DHA (PRENATAL ADULT GUMMY/DHA/FA PO) Past Week at Unknown time Self Yes Yes   Sig: Take by mouth daily   acetaminophen (TYLENOL) 325 mg tablet 12/4/2019 at Unknown time  No Yes   Sig: Take 2 tablets (650 mg total) by mouth every 4 (four) hours as needed for mild pain   cyclobenzaprine (FLEXERIL) 10 mg tablet 12/5/2019 at Unknown time  No Yes   Sig: Take 1 tablet (10 mg total) by mouth 3 (three) times a day as needed for muscle spasms   docusate sodium (COLACE) 100 mg capsule Past Week at Unknown time  No Yes   Sig: Take 1 capsule (100 mg total) by mouth 2 (two) times a day   ibuprofen (MOTRIN) 600 mg tablet Past Week at Unknown time  No Yes   Sig: Take 1 tablet (600 mg total) by mouth every 6 (six) hours as needed (cramping)   oxyCODONE-acetaminophen (PERCOCET) 5-325 mg per tablet 12/5/2019 at Unknown time  No Yes   Sig: Take 1 tablet by mouth every 6 (six) hours as needed for severe pain for up to 10 daysMax Daily Amount: 4 tablets   predniSONE 10 mg tablet 12/5/2019 at Unknown time  No Yes   Sig: Take 4 tablets (40 mg total) by mouth daily for 4 days Then 3 tabs/day for 5 days, then 2 tabs/day for 5 days, then 1 tab/day for 5 days, 1tab/every 2 days for 4 days      Facility-Administered Medications: None       Allergies   Allergen Reactions    Bee Venom Anaphylaxis     Is suppose to carry EpiPen    Tobramycin      Pt states she is not sure if she is allergic to this      Objective   Vitals:Blood pressure 137/91, pulse 80, temperature 98 °F (36 7 °C), temperature source Oral, resp  rate 16, height 5' 5" (1 651 m), weight 106 kg (233 lb 0 4 oz), last menstrual period 03/09/2019, SpO2 97 %, currently breastfeeding  ,Body mass index is 38 78 kg/m²  Intake/Output Summary (Last 24 hours) at 12/6/2019 0724  Last data filed at 12/6/2019 0601  Gross per 24 hour   Intake 2247 15 ml   Output 1450 ml   Net 797 15 ml     Invasive Devices: Invasive Devices     Peripheral Intravenous Line            Peripheral IV 12/05/19 Left Hand less than 1 day    Peripheral IV 12/05/19 Right Antecubital less than 1 day              Physical Exam  Neurologic Exam    Vital signs reviewed, as above  Constitutional - in NAD, lying in bed, cooperative and pleasant interactive with examiner  HEENT - NC/AT, no icterus noted, conjuctiva clear, oral mucosa free of exudate, + tongue bite noted  Cardiac - No murmur noted, rate regular  Lungs - No respiratory distress noted  Abdomen - Non distended  Extremities - No edema noted  Skin - no rashes noted, no ecchymosis    Neurological    Mental status - the patient is awake alert oriented x 3, with no evidence of aphasia or dysarthria, patient is able to follow simple commands, is able to follow complex commands  No paraphasic errors noted  She is able tell me the president, month, current events, she is able tell me why she is in the hospital   She is able to read and repeat, recognize objects  Normal attention and concentration  She is able to do simple calculations and complex calculations  Cranial nerves 2 through 12 are intact - pupils are equally reactive, extra occular movements are intact without any gaze preference or gaze palsy, no facial droop noted, V1 through V3 intact to touch, tongue midline, sternocleidomastoid is intact bilaterally  Motor - 5/5 upper extremities and lower extremities, normal tone and normal bulk, there was a slight drift of the left upper extremity, compared to the right  No tremor or fixed deficit noted, with testing    Rapid movements are equal bilaterally - ? Slightly slower on the left  Sensation - nonfocal to touch, no gross neglect noted  Coordination -  no ataxia noted on finger-to-nose     Reflexes are equal - brisk through out    Toes are downgoing bilaterally    No evidence of clonus or myoclonus or tremor  No evidence of seizure activity,no starring, no myoclonus       Lab Results:     Recent Results (from the past 24 hour(s))   Comprehensive metabolic panel    Collection Time: 12/05/19  5:00 PM   Result Value Ref Range    Sodium 135 (L) 136 - 145 mmol/L    Potassium 5 0 3 5 - 5 3 mmol/L    Chloride 108 100 - 108 mmol/L    CO2 22 21 - 32 mmol/L    ANION GAP 5 4 - 13 mmol/L    BUN 14 5 - 25 mg/dL    Creatinine 0 75 0 60 - 1 30 mg/dL    Glucose 94 65 - 140 mg/dL    Calcium 8 7 8 3 - 10 1 mg/dL    AST 45 5 - 45 U/L    ALT 26 12 - 78 U/L    Alkaline Phosphatase 129 (H) 46 - 116 U/L    Total Protein 8 6 (H) 6 4 - 8 2 g/dL    Albumin 2 1 (L) 3 5 - 5 0 g/dL    Total Bilirubin 0 38 0 20 - 1 00 mg/dL    eGFR 111 ml/min/1 73sq m   CBC and differential    Collection Time: 12/05/19  5:00 PM   Result Value Ref Range    WBC 8 44 4 31 - 10 16 Thousand/uL    RBC 3 97 3 81 - 5 12 Million/uL    Hemoglobin 11 0 (L) 11 5 - 15 4 g/dL    Hematocrit 34 7 (L) 34 8 - 46 1 %    MCV 87 82 - 98 fL    MCH 27 7 26 8 - 34 3 pg    MCHC 31 7 31 4 - 37 4 g/dL    RDW 13 8 11 6 - 15 1 %    MPV 10 2 8 9 - 12 7 fL    Platelets 646 417 - 819 Thousands/uL    nRBC 0 /100 WBCs    Neutrophils Relative 86 (H) 43 - 75 %    Immat GRANS % 1 0 - 2 %    Lymphocytes Relative 10 (L) 14 - 44 %    Monocytes Relative 2 (L) 4 - 12 %    Eosinophils Relative 1 0 - 6 %    Basophils Relative 0 0 - 1 %    Neutrophils Absolute 7 26 1 85 - 7 62 Thousands/µL    Immature Grans Absolute 0 11 0 00 - 0 20 Thousand/uL    Lymphocytes Absolute 0 81 0 60 - 4 47 Thousands/µL    Monocytes Absolute 0 19 0 17 - 1 22 Thousand/µL    Eosinophils Absolute 0 05 0 00 - 0 61 Thousand/µL    Basophils Absolute 0 02 0 00 - 0 10 Thousands/µL   Protein / creatinine ratio, urine    Collection Time: 12/05/19  5:20 PM   Result Value Ref Range    Creatinine, Ur 79 7 mg/dL    Protein Urine Random 110 mg/dL    Prot/Creat Ratio, Ur 1 38 (H) 0 00 - 0 10   Urinalysis with microscopic    Collection Time: 12/05/19  5:20 PM   Result Value Ref Range    Clarity, UA Cloudy     Color, UA Yellow     Specific Salome, UA 1 019 1 003 - 1 030    pH, UA 5 5 4 5, 5 0, 5 5, 6 0, 6 5, 7 0, 7 5, 8 0    Glucose, UA Negative Negative mg/dl    Ketones, UA Negative Negative mg/dl    Blood, UA Large (A) Negative    Protein,  (2+) (A) Negative mg/dl    Nitrite, UA Negative Negative    Bilirubin, UA Negative Negative    Urobilinogen, UA 0 2 0 2, 1 0 E U /dl E U /dl    Leukocytes, UA Negative Negative    WBC, UA 4-10 (A) None Seen, 0-5, 5-55, 5-65 /hpf    RBC, UA None Seen None Seen, 0-5 /hpf    Bacteria, UA Occasional None Seen, Occasional /hpf    Epithelial Cells Occasional None Seen, Occasional /hpf   Antithrombin III Activity    Collection Time: 12/05/19  8:05 PM   Result Value Ref Range    AntiThrombIN III Activity 118 92 - 136 % of Normal   Protime-INR    Collection Time: 12/05/19  9:09 PM   Result Value Ref Range    Protime 13 8 11 6 - 14 5 seconds    INR 1 10 0 84 - 1 19   APTT    Collection Time: 12/05/19  9:09 PM   Result Value Ref Range     (HH) 23 - 37 seconds   CBC    Collection Time: 12/06/19 12:13 AM   Result Value Ref Range    WBC 8 69 4 31 - 10 16 Thousand/uL    RBC 3 67 (L) 3 81 - 5 12 Million/uL    Hemoglobin 10 4 (L) 11 5 - 15 4 g/dL    Hematocrit 32 1 (L) 34 8 - 46 1 %    MCV 88 82 - 98 fL    MCH 28 3 26 8 - 34 3 pg    MCHC 32 4 31 4 - 37 4 g/dL    RDW 14 0 11 6 - 15 1 %    Platelets 275 607 - 092 Thousands/uL    MPV 9 8 8 9 - 12 7 fL   Comprehensive metabolic panel    Collection Time: 12/06/19 12:13 AM   Result Value Ref Range    Sodium 140 136 - 145 mmol/L    Potassium 3 2 (L) 3 5 - 5 3 mmol/L    Chloride 108 100 - 108 mmol/L    CO2 24 21 - 32 mmol/L    ANION GAP 8 4 - 13 mmol/L    BUN 10 5 - 25 mg/dL    Creatinine 0 56 (L) 0 60 - 1 30 mg/dL    Glucose 97 65 - 140 mg/dL    Calcium 7 5 (L) 8 3 - 10 1 mg/dL    AST 14 5 - 45 U/L    ALT 21 12 - 78 U/L    Alkaline Phosphatase 122 (H) 46 - 116 U/L    Total Protein 7 8 6 4 - 8 2 g/dL    Albumin 2 4 (L) 3 5 - 5 0 g/dL    Total Bilirubin 0 13 (L) 0 20 - 1 00 mg/dL    eGFR 130 ml/min/1 73sq m   APTT    Collection Time: 12/06/19  1:50 AM   Result Value Ref Range    PTT >210 (HH) 23 - 37 seconds   Magnesium    Collection Time: 12/06/19  1:50 AM   Result Value Ref Range    Magnesium 4 8 (H) 1 6 - 2 6 mg/dL   CBC    Collection Time: 12/06/19  4:46 AM   Result Value Ref Range    WBC 6 72 4 31 - 10 16 Thousand/uL    RBC 3 58 (L) 3 81 - 5 12 Million/uL    Hemoglobin 10 1 (L) 11 5 - 15 4 g/dL    Hematocrit 32 5 (L) 34 8 - 46 1 %    MCV 91 82 - 98 fL    MCH 28 2 26 8 - 34 3 pg    MCHC 31 1 (L) 31 4 - 37 4 g/dL    RDW 13 8 11 6 - 15 1 %    Platelets 060 060 - 429 Thousands/uL    MPV 9 8 8 9 - 12 7 fL   Comprehensive metabolic panel    Collection Time: 12/06/19  4:46 AM   Result Value Ref Range    Sodium 137 136 - 145 mmol/L    Potassium 3 2 (L) 3 5 - 5 3 mmol/L    Chloride 107 100 - 108 mmol/L    CO2 25 21 - 32 mmol/L    ANION GAP 5 4 - 13 mmol/L    BUN 8 5 - 25 mg/dL    Creatinine 0 54 (L) 0 60 - 1 30 mg/dL    Glucose 100 65 - 140 mg/dL    Calcium 7 2 (L) 8 3 - 10 1 mg/dL    AST 14 5 - 45 U/L    ALT 21 12 - 78 U/L    Alkaline Phosphatase 118 (H) 46 - 116 U/L    Total Protein 7 6 6 4 - 8 2 g/dL    Albumin 2 0 (L) 3 5 - 5 0 g/dL    Total Bilirubin 0 21 0 20 - 1 00 mg/dL    eGFR 132 ml/min/1 73sq m   Magnesium    Collection Time: 12/06/19  4:46 AM   Result Value Ref Range    Magnesium 5 0 (H) 1 6 - 2 6 mg/dL   Phosphorus    Collection Time: 12/06/19  4:46 AM   Result Value Ref Range    Phosphorus 3 7 2 7 - 4 5 mg/dL   Lipid panel    Collection Time: 12/06/19  4:46 AM   Result Value Ref Range    Cholesterol 206 (H) 50 - 200 mg/dL    Triglycerides 203 (H) <=150 mg/dL    HDL, Direct 39 (L) >=40 mg/dL    LDL Calculated 126 (H) 0 - 100 mg/dL    Non-HDL-Chol (CHOL-HDL) 167 mg/dl   Hemoglobin A1C    Collection Time: 12/06/19  4:46 AM   Result Value Ref Range    Hemoglobin A1C 5 7 4 2 - 6 3 %     mg/dl     Procedure: Cta Head And Neck With And Without Contrast    Result Date: 12/5/2019  Narrative: CTA NECK AND BRAIN WITH AND WITHOUT CONTRAST INDICATION: seizure, postpartum, headache COMPARISON:   None  TECHNIQUE:  Routine CT imaging of the Brain without contrast   Post contrast imaging was performed after administration of iodinated contrast through the neck and brain  Post contrast axial 0 625 mm images timed to opacify the arterial system  3D rendering was performed on an independent workstation  MIP reconstructions performed  Coronal reconstructions were performed of the noncontrast portion of the brain  Radiation dose length product (DLP) for this visit:  662 36 mGy-cm   This examination, like all CT scans performed in the [facility], was performed utilizing techniques to minimize radiation dose exposure, including the use of iterative  reconstruction and automated exposure control  IV Contrast:  85 mL of iohexol (OMNIPAQUE)  IMAGE QUALITY:   Diagnostic FINDINGS: NONCONTRAST BRAIN PARENCHYMA:  No intracranial mass, mass effect or midline shift  No CT signs of acute infarction  No acute parenchymal hemorrhage  VENTRICLES AND EXTRA-AXIAL SPACES:  Normal for the patient's age  VISUALIZED ORBITS AND PARANASAL SINUSES:  Unremarkable  CERVICAL VASCULATURE AORTIC ARCH AND GREAT VESSELS:  Normal aortic arch and great vessel origins  Normal visualized subclavian vessels  RIGHT VERTEBRAL ARTERY CERVICAL SEGMENT:  Normal origin  The vessel is normal in caliber throughout the neck  LEFT VERTEBRAL ARTERY CERVICAL SEGMENT:  Normal origin  The vessel is normal in caliber throughout the neck  RIGHT EXTRACRANIAL CAROTID SEGMENT:  Normal caliber common carotid artery    Normal bifurcation and cervical internal carotid artery  No stenosis or dissection  LEFT EXTRACRANIAL CAROTID SEGMENT:  Normal caliber common carotid artery  Normal bifurcation and cervical internal carotid artery  No stenosis or dissection  NASCET criteria was used to determine the degree of internal carotid artery diameter stenosis  INTRACRANIAL VASCULATURE INTERNAL CAROTID ARTERIES:  Normal enhancement of the intracranial portions of the internal carotid arteries  Normal ophthalmic artery origins  Normal ICA terminus  ANTERIOR CIRCULATION:  Symmetric A1 segments and anterior cerebral arteries with normal enhancement  Normal anterior communicating artery  MIDDLE CEREBRAL ARTERY CIRCULATION:  M1 segment and middle cerebral artery branches demonstrate normal enhancement bilaterally  DISTAL VERTEBRAL ARTERIES:  Normal distal vertebral arteries  Posterior inferior cerebellar artery origins are normal  Normal vertebral basilar junction  BASILAR ARTERY:  Basilar artery is normal in caliber  Normal superior cerebellar arteries  POSTERIOR CEREBRAL ARTERIES: Both posterior cerebral arteries arises from the basilar tip  Both arteries demonstrate normal enhancement  Normal posterior communicating arteries  DURAL VENOUS SINUSES:  Filling defects in the superior sagittal sinus series 5 image 17 and also on the coronal reformats suggesting sinus venous thrombosis  Contrast-enhanced brain MRI with bravo sequence recommended for further evaluation  NON VASCULAR ANATOMY BONY STRUCTURES:  No acute osseous abnormality  SOFT TISSUES OF THE NECK:  Normal  THORACIC INLET:  Unremarkable  Impression: Superior sagittal sinus thrombosis  Contrast-enhanced brain MRI with bravo sequence recommended for further evaluation  Workstation performed: [id number]     Procedure: Mri Brain W Wo Contrast    Result Date: 12/6/2019  Narrative: MRI BRAIN WITH AND WITHOUT CONTRAST INDICATION: Postpartum seizure, headache and left-sided weakness  Blurred vision    CT angiogram demonstrating superior sagittal sinus thrombosis  COMPARISON:  12/5/2019  Vani Cleo TECHNIQUE: Sagittal T1, axial T2, axial FLAIR, axial T1, axial [place], axial diffusion  Sagittal, axial T1 postcontrast   Axial bravo postcontrast with coronal reconstructions  IV Contrast:  11 mL of gadobutrol injection (MULTI-DOSE)  IMAGE QUALITY:   Diagnostic  FINDINGS: BRAIN PARENCHYMA:  There is mildly restricted diffusion in the right central and precentral sulci anterior lesser extent in the left perirolandic region  Corresponding T2/FLAIR hyperintensity and mild gyral swelling in the right parietal region though no evidence of additional signal abnormality in the left parietal lobe  VENTRICLES:  Extensive susceptibility artifact along the midline posterior aspects of the superior sagittal sinus corresponding to filling defects on postcontrast images and consistent with findings on the prior CT angiogram indicating thrombus in the superior sagittal sinus  Occlusion of adjacent cortical veins as demonstrated by susceptibility artifact and absence of enhancement in the veins along the posterior aspect of the bilateral parietal convexities  Susceptibility artifact throughout right and left posterior parietal sulci along the convexity with corresponding increased enhancement in the sulci likely representing venous congestion  Somewhat prominent dural enhancement over the convexities also likely related to venous sinus thrombosis  No hydrocephalus  SELLA AND PITUITARY GLAND:  Normal  ORBITS:  Intact globes and orbits  PARANASAL SINUSES:  No significant paranasal sinus disease  VASCULATURE:  Evaluation of the major intracranial vasculature demonstrates appropriate flow voids  CALVARIUM AND SKULL BASE:  No osseous lesion  EXTRACRANIAL SOFT TISSUES:  Soft tissue mass or swelling  Impression: 1  Redemonstration of thrombus in the superior sagittal sinus    Bilateral cortical veins along the posterior aspect of the proximal convexities are also thrombosed  Significant venous congestion within smaller draining veins along the bilateral parietal convexities, greater on the right  2   Minimally restricted diffusion in the perirolandic region bilaterally, though more prominent on the right, additionally with mild gyral swelling and T2 hyperintensity in the right central and precentral gyri  Findings suggestive of vasogenic edema and ischemia without clear evidence of infarct secondary to venous sinus and cortical vein thrombosis  No evidence of hemorrhage  3   Focal mild gyral swelling in the right parietal region  No generalized cerebral edema or significant mass effect  I personally discussed this study with Sanket Pierce on 12/6/2019 at 1:12 AM  Workstation performed: [id number]     Procedure: Mrv Head Wo Contrast    Result Date: 12/6/2019  Narrative: MAGNETIC RESONANCE VENOGRAPHY (MRV) OF THE BRAIN  INDICATION:   Postpartum seizure, headache and superior sagittal sinus thrombosis demonstrated on prior CT angiogram  COMPARISON:  12/05/2019 IMAGE QUALITY:  Diagnostic TECHNIQUE:   2-D coronal time-of-flight MR venography of the brain was performed  Examination performed on 1 5T MRI system  FINDINGS: Filling defects in the mid and posterior aspects of the superior sagittal sinus correspond to thrombus on the prior CT and filling defects demonstrated on the accompanying postcontrast images of the brain  Postcontrast imaging of the brain also demonstrates probable occlusion of cortical veins along the posterior aspects of the parietal convexities as demonstrated by absence of flow related enhancement and susceptibility artifact  Transverse and sigmoid sinuses are patent  Patent straight sinus  Internal cerebral veins are patent  Patent jugular bulbs and visualized jugular veins  Impression: Stable thrombus in the mid and posterior aspects of the superior sagittal sinus    Thrombosis of bilateral cortical veins along the posterior parietal convexity  I personally discussed this study with Junaid Eastman on 12/6/2019 at 1:18 AM     Workstation performed: [id number]     Imaging Studies: I have personally reviewed pertinent reports  Code Status: Level 1 - Full Code    Counseling / Coordination of Care  Reviewed imaging, medications, labs completed, reviewed with ICU team - plan of care, as well as family at potential medications upon discharge, history and examination, medical decision making as per attending physician  Greater than 1/2 time spent coordinating care - speaking with ICU nursing, testing, reviewing imaging, and reviewing with patient and family plan of care, 60 minutes of time spent

## 2019-12-06 NOTE — OCCUPATIONAL THERAPY NOTE
[address] Evaluation     Patient Name: Kranthi HIDALGO Date: 2019  Problem List  Principal Problem: Thrombosis of superior sagittal sinus  Active Problems:    Alloimmune thrombocytopenia during pregnancy, antepartum    Status post  delivery    Seizure [facility])    Hypertension complicating pregnancy, delivered, current hospitalization    Past Medical History  Past Medical History:   Diagnosis Date     alloimmune thrombocytopenia     Obesity complicating pregnancy in second trimester 2019    Varicella     childhood     Past Surgical History  Past Surgical History:   Procedure Laterality Date     SECTION      CHOLECYSTECTOMY          CHOLECYSTECTOMY LAPAROSCOPIC N/A 2017    Procedure: CHOLECYSTECTOMY LAPAROSCOPIC;  Surgeon: Elizabeth Bonner MD;  Location: [facility];  Service: General    NM  DELIVERY ONLY N/A 2019    Procedure:  SECTION () REPEAT;  Surgeon: Codie Bauman MD;  Location: [facility];  Service: Obstetrics             19 1230   Note Type   Note type Eval only   Restrictions/Precautions   Weight Bearing Precautions Per Order No   Other Precautions Multiple lines;Telemetry; Fall Risk;Pain   Pain Assessment   Pain Assessment No/denies pain   Pain Score No Pain   Home Living   Type of Home Apartment   Home Layout One level;Performs ADLs on one level; Able to live on main level with bedroom/bathroom;Stairs to enter with rails  (2nd floor apt, 14STE w/ railing, one-level inside)   Bathroom Shower/Tub Tub/shower unit   [address]   (denies any)   Home Equipment   (denies any)   Additional Comments Lives in 2nd floor apt w/ 14STE w/ railing, one-level inside, laundry not in building/apt   Prior Function   Level of [place] Independent with ADLs and functional mobility   Lives With Significant other   Receives Help From Family   ADL Assistance Independent   IADLs Independent   Falls in the last 6 months 0   Vocational Student   Comments Lives w/ S O  and   Reports I w/ ADLs/IADLs PTA  Pt is an accounting student and also works full-time as a  at [employer] PTA pt reports I w/ ADLs, IADLs, functional transfers/mobility, drove, denies use of DME   Reciprocal Relationships lives w/ S O  and , mother-in-law lives next door, dad lives close by   Service to [employer] and works full-time at [employer] reports having too minimal time to participate in leisure activities   [address] X   Patient Behaviors/Mood Tearful   ADL   Eating Assistance 7  Independent   Grooming Assistance 7  Independent   UB Bathing Assistance 7  [address] [facility] [address]  [place] [address]  7  [address] [facility] 5  Supervision/Setup   LB Dressing Deficit Don/doff R sock; Don/doff L sock; Supervision/safety   Toileting Assistance  5  Supervision/Setup   Functional Assistance 4  Minimal Assistance   Bed Mobility   Supine to Sit Unable to assess   Sit to Supine Unable to assess   Additional Comments Received up OOB in chair prior to and at end of session   Transfers   Sit to Stand 4  Minimal assistance   Additional items Assist x 1; Increased time required;Verbal cues   Stand to Sit 4  Minimal assistance   Additional items Assist x 1; Increased time required;Verbal cues   Additional Comments Performed sit to stand from chair and stand to sit back to chair  Required Min A x 1 to steady balance and VC for safety   Functional Mobility   Functional Mobility 4  Minimal assistance   Additional Comments Required Min A x 1 to steady balance   VC given for safety   Balance   Static Sitting Fair +   Dynamic Sitting Fair   Static Standing Fair -   Dynamic Standing Poor +   Ambulatory Poor +   Activity Tolerance   Activity Tolerance Patient tolerated treatment well;Patient limited by fatigue   Nurse Made Aware yes, Sachi Sharif Assessment   RUE Assessment WFL   LUE Assessment   LUE Assessment WFL   Hand Function   Gross Motor Coordination Functional   Fine Motor Coordination Functional   Sensation   Light Touch No apparent deficits  (states her symptoms have all improved)   Vision-Basic Assessment   Current Vision Wears glasses only for reading   Patient Visual Report   (reports onset of blurry vision in both eyes)   Vision - Complex Assessment   Ocular Range of Motion WFL   Head Position WDL   Tracking Able to track stimulus in all quads without difficulty   Saccades   (able to perform saccades w/o difficulty)   Cognition   Overall Cognitive Status [facility]   Arousal/Participation Alert; Responsive; Cooperative   Attention Within functional limits   Orientation Level Oriented X4   Memory Within functional limits   Following Commands Follows all commands and directions without difficulty   Comments Pt is pleasant and cooperative  Able to attend to full therapy session w/o VC for redirection  Pt able to recall home set up/biographical info w/o difficulty  Pt became tearful at end of session  Pt given some emotional support prior to leaving room   Assessment   Limitation Decreased ADL status; Decreased endurance;Decreased self-care trans;Decreased high-level ADLs   Prognosis Good   Assessment Pt is a 22 y/o female seen for OT eval s/p adm to [facility] POD # 6 from scheduled "repeat low transverse  section status post grand mal seizure at home, focal seizure activity in the ED, with severe range blood pressures in the ED and left sided motor & sensory deficits"  Pt dx'd w/ thrombosis of superior sagittal sinus  Pt  has a past medical history of  alloimmune thrombocytopenia, Obesity complicating pregnancy in second trimester, and Varicella  Pt with active OT orders and up with assistance  orders  Pt lives with S O  And  in 2nd floor apartment w/ 14STE w/ R railing, and one-level inside  Laundry not located inside  Pt must drive to do laundry  Pt was I w/ ADLS and IADLS, drove, & required no use of DME PTA  Pt is currently functioning at a level of: independent for UB ADLs, supervision for LB ADLs and functional transfers, and Min A x 1 for functional mobility  Pt continues to be limited by decreased activity tolerance, decreased endurance, decreased balance, fatigue, and intermittent pain, but otherwise functioning similar to baseline level  Pt reports no significant concerns about returning home at this time  Pt reports she has family available to assist at home and transport until she is able to drive again  Pt should continue to engage in ADL retraining and functional transfers/mobility with nursing and restorative staff to promote independence  Pt scored overall 70/100 on the Barthel Index  Based on the ongoing limitations, pt has been identified as a moderate complexity evaluation  Recommend home with family support upon D/C  Pt has no immediate acute skilled OT needs at this time  Please re-consult if medical status changes     Goals   Patient Goals to have help and not be overwhelmed   Recommendation   OT Discharge Recommendation Home with family support   OT - OK to Discharge Yes  (once medically stable)   Barthel Index   Feeding 10   Bathing 5   Grooming Score 5   Dressing Score 10   Bladder Score 10   Bowels Score 10   Toilet Use Score 10   Transfers (Bed/Chair) Score 10   Mobility (Level Surface) Score 0   Stairs Score 0   Barthel Index Score 70   Modified Madison Scale   Modified Madison Scale 4     Leana Detweiler, OTS

## 2019-12-06 NOTE — PHYSICAL THERAPY NOTE
Physical Therapy Evaluation     12/06/19 1128   Note Type   Note type Eval/Treat   Pain Assessment   Pain Assessment No/denies pain   Pain Score No Pain   Home Living   Type of Home Apartment   Home Layout One level; Able to live on main level with bedroom/bathroom; Performs ADLs on one level;Stairs to enter with rails   Home Equipment   (Pt reports her mom owns [employer] [facility] [employer] [facility], )   Additional Comments Pt reports she lives in 2nd floor apt with 14 [address]IMA with railing  Pt reports there is no elevator access  Pt does not own DME, but reports her mom owns [facility],   Prior Function   Level of [place] Independent with ADLs and functional mobility   Lives With Significant other  (children)   Receives Help From Family   ADL Assistance Independent   IADLs Independent   Falls in the last 6 months 0   Vocational Full time employment   Comments Pt reports she lives with her boyfriend and children, and was independent PTA  Pt reports her  can take some time off work to provide support at home upon DC, and her mother in law lives close by to provide support upon DC  Restrictions/Precautions   Weight Bearing Precautions Per Order No   Other Precautions Bed Alarm; Fall Risk;Pain;Telemetry;Multiple lines   General   Family/Caregiver Present Yes  (Mother)   Cognition   Overall Cognitive Status WFL   Arousal/Participation Cooperative   Orientation Level Oriented X4   Following Commands Follows all commands and directions without difficulty   Comments Pt is pleasant and talkative throughout session and follows all commands without difficulty     RLE Assessment   RLE Assessment WFL  (Grossly 4+/5)   LLE Assessment   LLE Assessment WFL  (Grossly 4+/5)   Coordination   Movements are Fluid and Coordinated 0  (Pt is unsteady with ambulation trial)   Sensation   (Pt reports no LE sensory deficits)   Bed Mobility   Supine to Sit 5  Supervision   Additional items Verbal cues   Transfers   Sit to Stand 4  Minimal assistance   Additional items Assist x 1; Increased time required;Verbal cues   Stand to Sit 4  Minimal assistance   Additional items Assist x 1; Increased time required;Verbal cues   Toilet transfer 5  Supervision   Additional items Verbal cues; Commode; Increased time required   Ambulation/Elevation   Gait pattern Improper Weight shift;Decreased foot clearance;Narrow LALITO;Shuffling; Short stride; Excessively slow   Gait Assistance 4  Minimal assist   Additional items Assist x 1;Verbal cues   Assistive Device Rolling walker   Distance 5ft, 200ft   Balance   Static Sitting Good   Dynamic Sitting Fair   Static Standing Fair -   Dynamic Standing Poor +   Ambulatory Poor +   Endurance Deficit   Endurance Deficit Yes   Endurance Deficit Description Pt reports fatigue at end of session   Activity Tolerance   Activity Tolerance Patient tolerated treatment well;Patient limited by fatigue   Medical Staff Made Aware RN, CM   Nurse Made Aware Yes Tracy   Assessment   Prognosis Good   Problem List Decreased endurance; Impaired balance;Decreased mobility; Decreased coordination;Pain   Assessment Pt is a 21year old female presenting to [facility] ED on 12/5/19 after a witnessed seizure at home  Pt recently admitted for scheduled C section  Pt with PMH significant for obesity, cholecystectomy, and C section delivery  Pt presents for therapy session supine and is agreeable to therapy session  Pt is pleasant and talkative throughout session and follows all commands without difficulty  Pt transferred supine to sit with supervision  Pt transferred sit to stand with minAx1  Pt ambulated 5 ft to bedside chair with minAx1  Pt demonstrates narrow LALITO, short stride, and slow trinidad during ambulation to bedside chair  Pt remained seated in bedside chair at end of session with all needs within reach and RN Tracy notified   Pt is anticipated safe to DC home with family support pending further ambulation and stair trial  PT to follow   Barriers to Discharge Inaccessible home environment Goals   Patient Goals to go home and take care of her baby   STG Expiration Date 12/20/19   Short Term Goal #1 In 10 sessions pt will: 1  Transfer supine to sit independently 2  Transfer sit to stand with modified independence and LRAD 3  Transfer to bedside chair with modified independence and LRAD 4  Perform LE exercise program 5  Ambulate >300ft with modified independence and LRAD 6  Ascend/descend 14 steps with modified independence and use of hand rail if needed 7  Increase activity tolerance to >30 min    PT Treatment Day 0   Plan   Treatment/Interventions Functional transfer training;LE strengthening/ROM; Therapeutic exercise; Endurance training;Equipment eval/education; Bed mobility;Gait training;Spoke to nursing;Family;Spoke to case management   PT Frequency   (3-6x/wk)   Recommendation   Recommendation Home with family support  (pending further ambulation, stair trial)   Equipment Recommended   (Continue to assess as appropriate)   PT - OK to Discharge No  (pending further ambulation, stair trail)   Modified Johanna Scale   Modified Johanna Scale 4   Barthel Index   Feeding 10   Bathing 0   Grooming Score 5   Dressing Score 10   Bladder Score 10   Bowels Score 10   Toilet Use Score 5   Transfers (Bed/Chair) Score 10   Mobility (Level Surface) Score 10   Stairs Score 0   Barthel Index Score 70     Physical Therapy Treatment Note  Time In: 11:13  Time Out: 11:28    Pt seen today for physical therapy treatment session focused on therapeutic activity and gait training  Pt presents for therapy session seated in bed side chair and is agreeable to therapy session  Pt transferred sit to stand with RW and supervision  Pt ambulated 200ft in martinez with RW, minAx1 progressing to supervision, and assist of second person for line management  Pt reports feeling mildly unsteady on her feet during ambulation  Pt again demonstrates slow trinidad, short stride, and narrow LALITO during ambulation   Pt returned to room and transferred to bedside commode with supervision  Pt transferred to bedside chair with supervision and remained in chair at end of session with all needs within reach and RN Tracy notified  Pt is anticipated safe to DC home with family support pending further ambulation, stair trial  Continue to assess pt AD needs as appropriate   PT to follow    [facility], [facility]

## 2019-12-06 NOTE — PROGRESS NOTES
Marie Bullock was evaluated after returning from CT scan  She states that she feels like she is "on cloud 9"  She no longer feels shaky  Her blood pressure is 141/77 and magnesium infusion is running  Discussed that eclampsia is our working diagnosis unless proven otherwise  Discussed eclampsia and management with the patient and family  Will follow up CTA  Arthur Bryson MD  OB/GYN  12/5/2019  7:07 PM

## 2019-12-06 NOTE — PLAN OF CARE
Problem: PHYSICAL THERAPY ADULT  Goal: Performs mobility at highest level of function for planned discharge setting  See evaluation for individualized goals  Description  Treatment/Interventions: Functional transfer training, LE strengthening/ROM, Therapeutic exercise, Endurance training, Equipment eval/education, Bed mobility, Gait training, Spoke to nursing, Family, Spoke to case management  Equipment Recommended: (Continue to assess as appropriate)       See flowsheet documentation for full assessment, interventions and recommendations  Note:   Prognosis: Good  Problem List: Decreased endurance, Impaired balance, Decreased mobility, Decreased coordination, Pain  Assessment: Pt is a 21year old female presenting to [facility] ED on 12/5/19 after a witnessed seizure at home  Pt recently admitted for scheduled C section  Pt with PMH significant for obesity, cholecystectomy, and C section delivery  Pt presents for therapy session supine and is agreeable to therapy session  Pt is pleasant and talkative throughout session and follows all commands without difficulty  Pt transferred supine to sit with supervision  Pt transferred sit to stand with minAx1  Pt ambulated 5 ft to bedside chair with minAx1  Pt demonstrates narrow LALITO, short stride, and slow trinidad during ambulation to bedside chair  Pt remained seated in bedside chair at end of session with all needs within reach and RN Tracy notified  Pt is anticipated safe to DC home with family support pending further ambulation and stair trial  PT to follow  Barriers to Discharge: Inaccessible home environment     Recommendation: (S) Home with family support(pending further ambulation, stair trial)     PT - OK to Discharge: (S) No(pending further ambulation, stair trail)    See flowsheet documentation for full assessment        Caden Prasad, SPT

## 2019-12-06 NOTE — PROGRESS NOTES
Daily Progress Note - Critical Care   Anuel Gibbs 21 y o  female MRN: [id number]  Unit/Bed#: MICU 06 Encounter: [id number]        ----------------------------------------------------------------------------------------  HPI/24hr events: This is a 21 y o  Female, with a history of Alloimmune thrombocytopenia, [id number], post partum day 6 after having a single gestation male,  who presented to the ER to the ER after having a reported with a reported seizure  According to the patient she was at home in her usual state of health today holding her child went around 11:00 a m  She began to experience left arm numbness  Later that afternoon around 3:00 p m  She began to have jerking in her neck  She put her child down and at that point her significant other helped her laid down and noted convulsions began at that time  The patient was postictal afterwards, no reports of incontinence, but she did bite her tongue  She has a history of post partum headaches that was associated with epidural anesthesia  In the ED patient, with left sided weakness with an NIH score noted to be 2, and CT scan was obtained, with CTV being significant for superior sagittal sinus thrombosis  Initially, her blood pressure was elevated, and she received 20mg labetalol, as well as Ativan that time  She was started empirically on a magnesium bolus followed by infusion, and urine protein creatinine ratio was elevated  Neurology was consulted and agreed with current management and plan  MRI/MRV showed venous congestion proximal to a superior sagittal sinus thrombosis, greater on the right side, as well as thrombosis of the bilateral cortical veins with restricted diffusion bilaterally concerning for ischemia  There is also focal general swelling in the right parietal region    Overnight, she was reported to has a worsening left sided dift with 4/5 strength of the left upper and lower extremities    She has subjective numbness of the left upper extremity  She has a slightly depressed mental status, and mild confusion, however GCS is still 14  She was started on Keppra, OB was consulted, Heparin drip was started    SUBJECTIVE      Review of Systems   Constitutional: Negative for chills, diaphoresis, fatigue and fever  Respiratory: Negative for cough, chest tightness, shortness of breath and wheezing  Cardiovascular: Negative for chest pain, palpitations and leg swelling  Gastrointestinal: Negative for abdominal distention, abdominal pain, constipation, diarrhea, nausea and vomiting  Musculoskeletal: Negative for arthralgias and joint swelling  Skin: Positive for wound  Negative for color change     Neurological: Positive for numbness and headaches  Negative for facial asymmetry, speech difficulty, weakness and light-headedness  Complaining of tingling in Left arm  Psychiatric/Behavioral: Negative for agitation, confusion and decreased concentration   The patient is not nervous/anxious       ---------------------------------------------------------------------------------------  Assessment and Plan:    Neuro:    Diagnosis: Superior sagittal sinus thrombosis  o Plan: Continue heparin  o Consider hematology consult    Diagnosis: New onset seizure- [place]  o Plan: Continue to keppra  o Keppra level  o Continue q1 neuro checks   o Ativan PRN for seizure activity  o Continue mag per OB protocol      CV:    Diagnosis: Hypertension- post partum   o Plan: Continue to monitor BP   o SBP goal <160      Pulm:  o No acute issues      GI:    No active issues        :    Diagnosis: Protienuria  o Repeat UA      F/E/N:   - Hypomagnesia   - Deep tendon checks q4   -Hyperkalemia   - Replaced 20mEQ KCL x2      Heme/Onc:    Diagnosis: Superior sagittal sinus thrombosis   o Consider hematology consult  o Continue heparin      Endo:   No active issues    ID:  No active issues      MSK/Skin:   o No active issues      Disposition: Continue Critical Care   Code Status: Level 1 - Full Code  ---------------------------------------------------------------------------------------  ICU CORE MEASURES    Prophylaxis   VTE Pharmacologic Prophylaxis: Heparin Drip  VTE Mechanical Prophylaxis: sequential compression device  Stress Ulcer Prophylaxis: Prophylaxis Not Indicated    -Will discussed today for PO diet vs prophylaxis        Invasive Devices Review  Invasive Devices     Peripheral Intravenous Line            Peripheral IV 12/05/19 Left Hand less than 1 day    Peripheral IV 12/05/19 Right Antecubital less than 1 day                ---------------------------------------------------------------------------------------  OBJECTIVE    Physical Exam   Constitutional: She is oriented to person, place, and time  She appears well-developed and well-nourished  HENT:   Right Ear: Hearing normal    Left Ear: Hearing normal    Mouth/Throat: Mucous membranes are normal    Dried blood noted on the side of tongue  Eyes: Pupils are equal, round, and reactive to light  EOM and lids are normal    Cardiovascular: Normal rate, regular rhythm and normal heart sounds  Pulmonary/Chest: Effort normal and breath sounds normal    Abdominal: Soft  Bowel sounds are normal  There is no tenderness  Musculoskeletal:        Right wrist: She exhibits normal range of motion  Left wrist: She exhibits normal range of motion  Right ankle: She exhibits normal range of motion  Left ankle: She exhibits normal range of motion  Neurological: She is alert and oriented to person, place, and time  She has normal strength  No cranial nerve deficit or sensory deficit  GCS eye subscore is 4  GCS verbal subscore is 5  GCS motor subscore is 6  Skin: Skin is warm and dry  Well approximated pelvic scar  Psychiatric: She has a normal mood and affect   Her speech is normal and behavior is normal  Judgment and thought content normal  Cognition and memory are normal        Vitals   Vitals:    19 0315 19 0345 19 0415 19 0500   BP: 138/93 139/84 121/78 128/81   BP Location:   Left arm    Pulse: 80 90 84 74   Resp:   16    Temp:   98 °F (36 7 °C)    TempSrc:   Oral    SpO2: 97% 99% 99% 99%   Weight:       Height:         Temp (24hrs), Av 6 °F (37 °C), Min:98 °F (36 7 °C), Max:99 4 °F (37 4 °C)  Current: Temperature: 98 °F (36 7 °C)      Invasive/non-invasive ventilation settings   Respiratory    Lab Data (Last 4 hours)    None         O2/Vent Data (Last 4 hours)    None                Height and Weights   Height: 5' 5" (165 1 cm)  IBW: 57 kg  Body mass index is 38 78 kg/m²  Weight (last 2 days)     Date/Time   Weight    19 1930   106 (233 03)    19 1644   107 (234 79)                Intake and Output  I/O        0701 -  0700  0701 -  0700    I V  (mL/kg)  1297 2 (12 2)    IV Piggyback  950    Total Intake(mL/kg)  2247 2 (21 2)    Urine (mL/kg/hr)  1450    Total Output  1450    Net  +797 2          Unmeasured Urine Occurrence  1 x             Nutrition       Diet Orders   (From admission, onward)             Start     Ordered    19  Diet NPO  Diet effective now     Question Answer Comment   Diet Type NPO    RD to adjust diet per protocol?  No        19                  Laboratory and Diagnostics:  Results from last 7 days   Lab Units 19  0446 19  0013 19  1700 19  0726 19  0848   WBC Thousand/uL 6 72 8 69 8 44 6 81 6 59   HEMOGLOBIN g/dL 10 1* 10 4* 11 0* 9 2* 12 1   HEMATOCRIT % 32 5* 32 1* 34 7* 29 0* 37 5   PLATELETS Thousands/uL 240 266 280 189 239   NEUTROS PCT %  --   --  86* 72  --    MONOS PCT %  --   --  2* 8  --      Results from last 7 days   Lab Units 19  0446 19  0013 19  1700   SODIUM mmol/L 137 140 135*   POTASSIUM mmol/L 3 2* 3 2* 5 0   CHLORIDE mmol/L 107 108 108   CO2 mmol/L 25 24 22   ANION GAP mmol/L 5 8 5   BUN mg/dL 8 10 14   CREATININE mg/dL 0 54* 0 56* 0 75   CALCIUM mg/dL 7 2* 7 5* 8 7   GLUCOSE RANDOM mg/dL 100 97 94   ALT U/L 21 21 26   AST U/L 14 14 45   ALK PHOS U/L 118* 122* 129*   ALBUMIN g/dL 2 0* 2 4* 2 1*   TOTAL BILIRUBIN mg/dL 0 21 0 13* 0 38     Results from last 7 days   Lab Units 12/06/19  0446 12/06/19  0150   MAGNESIUM mg/dL 5 0* 4 8*   PHOSPHORUS mg/dL 3 7  --       Results from last 7 days   Lab Units 12/06/19  0150 12/05/19  2109   INR   --  1 10   PTT seconds >210* 156*              ABG:    VBG:          Micro          Imaging:  I have personally reviewed pertinent reports        Active Medications  Scheduled Meds:  Current Facility-Administered Medications:  acetaminophen 650 mg Oral Q4H PRN Rafael Sánchez MD    chlorhexidine 15 mL Swish & Spit Q12H [address] Rafael Sánchez MD    heparin (porcine) 3-30 Units/kg/hr (Order-Specific) Intravenous Titrated Mannie Payor, DO Last Rate: 15 Units/kg/hr (12/06/19 0418)   heparin (porcine) 4,200 Units Intravenous PRN Mannie Payor, DO    heparin (porcine) 8,400 Units Intravenous PRN Mannie Payor, DO    levETIRAcetam 750 mg Intravenous Q12H [address] Rafael Sánchez MD Last Rate: 750 mg (12/05/19 2143)   magnesium sulfate 2 g/hr Intravenous Continuous Eneida Merritt MD Last Rate: 2 g/hr (12/06/19 0549)   potassium chloride 20 mEq Intravenous Q2H Rafael Sánchez MD Last Rate: 20 mEq (12/06/19 0546)   sodium chloride 75 mL/hr Intravenous Continuous Rafael áSnchez MD Last Rate: 75 mL/hr (12/06/19 0434)     Continuous Infusions:    heparin (porcine) 3-30 Units/kg/hr (Order-Specific) Last Rate: 15 Units/kg/hr (12/06/19 0418)   magnesium sulfate 2 g/hr Last Rate: 2 g/hr (12/06/19 0549)   sodium chloride 75 mL/hr Last Rate: 75 mL/hr (12/06/19 0434)     PRN Meds:     acetaminophen 650 mg Q4H PRN   heparin (porcine) 4,200 Units PRN   heparin (porcine) 8,400 Units PRN       Allergies   Allergies   Allergen Reactions    Bee Venom Anaphylaxis     Is suppose to carry EpiPen    Tobramycin      Pt states she is not sure if she is allergic to this        Counseling / Coordination of [address] [place], [place] [address]VICENTA        Portions of the record may have been created with voice recognition software  Occasional wrong word or "sound a like" substitutions may have occurred due to the inherent limitations of voice recognition software    Read the chart carefully and recognize, using context, where substitutions have occurred

## 2019-12-06 NOTE — UTILIZATION REVIEW
Notification of Inpatient Admission/Inpatient Authorization Request   This is a Notification of Inpatient Admission for 5 Klarissa Castace  Be advised that this patient was admitted to our facility under Inpatient Status  Contact Macrina Kaye at [phone] for additional admission information  [facility]  DEDICATED -[phone]  Patient Name:   Bettina Friedman   YOB: 1995       [address]   [address]:   [place] [address]  Tax ID: [id number]  NPI: [id number] Attending Provider/NPI: Zeeshan Banerjee [[id number]]   Place of Service Code: 24     Place of Service Name:  [address] [place] [address]   Start Date: 12/5/19 1708     Discharge Date & Time: No discharge date for patient encounter  Type of Admission: Inpatient Status Discharge Disposition (if discharged): Home/Self Care   Patient Diagnoses: Thrombosis of superior sagittal sinus [G08]  Seizure ([address] ) [R56 9]  Seizures ([address] ) [R56 9]  Left-sided weakness [R53 1]     Orders: Admission Orders (From admission, onward)     Ordered        12/05/19 1708  Inpatient Admission  Once                    Assigned Utilization Review Contact: Macrina Kaye  Utilization   Network Utilization Review Department  Phone: [phone]; Fax [phone]  Email: [email or web address]   ATTENTION PAYERS: Please call the assigned Utilization  directly with any questions or concerns ALL voicemails in the department are confidential  Send all requests for admission clinical reviews, approved or denied determinations and any other requests to dedicated fax number belonging to the campus where the patient is receiving treatment

## 2019-12-06 NOTE — PLAN OF CARE
Problem: PAIN - ADULT  Goal: Verbalizes/displays adequate comfort level or baseline comfort level  Description  Interventions:  - Encourage patient to monitor pain and request assistance  - Assess pain using appropriate pain scale  - Administer analgesics based on type and severity of pain and evaluate response  - Implement non-pharmacological measures as appropriate and evaluate response  - Consider cultural and social influences on pain and pain management  - Notify physician/advanced practitioner if interventions unsuccessful or patient reports new pain  Outcome: Progressing     Problem: INFECTION - ADULT  Goal: Absence or prevention of progression during hospitalization  Description  INTERVENTIONS:  - Assess and monitor for signs and symptoms of infection  - Monitor lab/diagnostic results  - Monitor all insertion sites, i e  indwelling lines, tubes, and drains  - Monitor endotracheal if appropriate and nasal secretions for changes in amount and color  - [place] appropriate cooling/warming therapies per order  - Administer medications as ordered  - Instruct and encourage patient and family to use good hand hygiene technique  - Identify and instruct in appropriate isolation precautions for identified infection/condition  Outcome: Progressing  Goal: Absence of fever/infection during neutropenic period  Description  INTERVENTIONS:  - Monitor WBC    Outcome: Progressing     Problem: SAFETY ADULT  Goal: Patient will remain free of falls  Description  INTERVENTIONS:  - Assess patient frequently for physical needs  -  Identify cognitive and physical deficits and behaviors that affect risk of falls    -  [place] fall precautions as indicated by assessment   - Educate patient/family on patient safety including physical limitations  - Instruct patient to call for assistance with activity based on assessment  - Modify environment to reduce risk of injury  - Consider OT/PT consult to assist with strengthening/mobility  Outcome: Progressing  Goal: Maintain or return to baseline ADL function  Description  INTERVENTIONS:  -  Assess patient's ability to carry out ADLs; assess patient's baseline for ADL function and identify physical deficits which impact ability to perform ADLs (bathing, care of mouth/teeth, toileting, grooming, dressing, etc )  - Assess/evaluate cause of self-care deficits   - Assess range of motion  - Assess patient's mobility; develop plan if impaired  - Assess patient's need for assistive devices and provide as appropriate  - Encourage maximum independence but intervene and supervise when necessary  - Involve family in performance of ADLs  - Assess for home care needs following discharge   - Consider OT consult to assist with ADL evaluation and planning for discharge  - Provide patient education as appropriate  Outcome: Progressing  Goal: Maintain or return mobility status to optimal level  Description  INTERVENTIONS:  - Assess patient's baseline mobility status (ambulation, transfers, stairs, etc )    - Identify cognitive and physical deficits and behaviors that affect mobility  - Identify mobility aids required to assist with transfers and/or ambulation (gait belt, sit-to-stand, lift, walker, cane, etc )  - [place] fall precautions as indicated by assessment  - Record patient progress and toleration of activity level on Mobility SBAR; progress patient to next Phase/Stage  - Instruct patient to call for assistance with activity based on assessment  - Consider rehabilitation consult to assist with strengthening/weightbearing, etc   Outcome: Progressing     Problem: DISCHARGE PLANNING  Goal: Discharge to home or other facility with appropriate resources  Description  INTERVENTIONS:  - Identify barriers to discharge w/patient and caregiver  - Arrange for needed discharge resources and transportation as appropriate  - Identify discharge learning needs (meds, wound care, etc )  - Arrange for interpretive services to assist at discharge as needed  - Refer to Case Management Department for coordinating discharge planning if the patient needs post-hospital services based on physician/advanced practitioner order or complex needs related to functional status, cognitive ability, or social support system  Outcome: Progressing     Problem: Knowledge Deficit  Goal: Patient/family/caregiver demonstrates understanding of disease process, treatment plan, medications, and discharge instructions  Description  Complete learning assessment and assess knowledge base  Interventions:  - Provide teaching at level of understanding  - Provide teaching via preferred learning methods  Outcome: Progressing     Problem: Nutrition/Hydration-ADULT  Goal: Nutrient/Hydration intake appropriate for improving, restoring or maintaining nutritional needs  Description  Monitor and assess patient's nutrition/hydration status for malnutrition  Collaborate with interdisciplinary team and initiate plan and interventions as ordered  Monitor patient's weight and dietary intake as ordered or per policy  Utilize nutrition screening tool and intervene as necessary  Determine patient's food preferences and provide high-protein, high-caloric foods as appropriate       INTERVENTIONS:  - Monitor oral intake, urinary output, labs, and treatment plans  - Assess nutrition and hydration status and recommend course of action  - Evaluate amount of meals eaten  - Assist patient with eating if necessary   - Allow adequate time for meals  - Recommend/ encourage appropriate diets, oral nutritional supplements, and vitamin/mineral supplements  - Order, calculate, and assess calorie counts as needed  - Recommend, monitor, and adjust tube feedings and TPN/PPN based on assessed needs  - Assess need for intravenous fluids  - Provide specific nutrition/hydration education as appropriate  - Include patient/family/caregiver in decisions related to nutrition  Outcome: Progressing

## 2019-12-06 NOTE — PROGRESS NOTES
Gynecology Progress note   Bell Langley 21 y o  female MRN: [id number]  Unit/Bed#: MICU 06 Encounter: [id number]    Bell Langley reports feeling well overall this morning, "much better than yesterday " She expressed understanding of her diagnosis and do not have additional questions at this time  She denies any complaints of headache or neurologic concerns at this time  She notes that left sided weakness and numbness has significantly improved  She denies complaints of pain  She is spontaneously voiding and has appropriate bowel function  She is NPO with sips, denies nausea or vomiting  She denies complaints of fever, chills, chest pain, shortness of breath or calf tenderness  /91   Pulse 80   Temp 98 °F (36 7 °C) (Oral)   Resp 16   Ht 5' 5" (1 651 m)   Wt 106 kg (233 lb 0 4 oz)   LMP 03/09/2019 (Exact Date)   SpO2 97%   BMI 38 78 kg/m²     I/O last 3 completed shifts: In: 150 [IV Piggyback:150]  Out: -   I/O this shift:  In: 2097 2 [I V :1297 2; IV Piggyback:800]  Out: 1450 [Urine:1450]    Lab Results   Component Value Date    WBC 6 72 12/06/2019    HGB 10 1 (L) 12/06/2019    HCT 32 5 (L) 12/06/2019    MCV 91 12/06/2019     12/06/2019       Lab Results   Component Value Date    CALCIUM 7 2 (L) 12/06/2019    K 3 2 (L) 12/06/2019    CO2 25 12/06/2019     12/06/2019    BUN 8 12/06/2019    CREATININE 0 54 (L) 12/06/2019       No results found for: POCGLU    Physical Exam  Physical Exam   Constitutional: She is oriented to person, place, and time  She appears well-developed and well-nourished  No distress  Genitourinary:   Genitourinary Comments: No vaginal bleeding noted   HENT:   Head: Normocephalic  Right lip and tongue abrasion well healing  No facial droop   Eyes: Conjunctivae and EOM are normal    Neck: Normal range of motion  Cardiovascular: Normal rate, regular rhythm and normal heart sounds     Pulmonary/Chest: Effort normal and breath sounds normal  No respiratory distress  Abdominal: Soft  She exhibits no distension  There is no tenderness  There is no rebound and no guarding   incision clean/dry/intact    Musculoskeletal: Normal range of motion  She exhibits no edema or tenderness  SCDs on / on bilaterally    Neurological: She is alert and oriented to person, place, and time  No cranial nerve deficit  She exhibits normal muscle tone  RUE/RLE strength 5/5, sensation intact  LUE/LLE strength 4/5, sensation intact, improved from my examination yesterday     Skin: Skin is warm and dry  She is not diaphoretic  No erythema  No pallor  Psychiatric: She has a normal mood and affect  Her behavior is normal        Assessment / Plan:   Brittaney Josue is 21 y o  P12 female postoperative day #7 from scheduled, uncomplicated repeat low transverse  section admitted yesterday, 19 on POD#6, after witnessed grand mal seizure at home, initially with severe range blood pressures requiring IV treatment with 20 & 40 of labetalol, started on Magnesium sulfate infusion for presumptive diagnosis of eclampsia, with left sided motor and sensory deficits on examination and ultimately with CT/MRI/MRV findings significant for superior sagittal sinus thrombosis as well as thrombosis of the bilateral cortical veins along the posterior convexity, in ICU for management, on a heparin infusion  Symptoms clinically improved  1) Seizure, likely secondary to cerebral venous thrombosis & bilateral cortical vein thrombosis  Unlikely eclampsia given alternative etiology of cerebral venous thrombosis as mentioned above  Blood pressures overall 120s-130s/70s-90s overnight, platelets, LFTs and creatinine remain WNL  Agree with plan for discontinuation of Magnesium sulfate infusion  Management per ICU & Neurology: Follow up hypercoagulable workup labs  Allow permission hypertension, goal <180     Continue heparin drip with goal PTT 60-90  Keppra prophylaxis x 7 day course  Follow up swallow evaluation & PT/OT  Continue frequent neuro re-evaluations    2)  alloimmune thrombocytopenia  Status post IVIG antepartum and steroid administration postpartum    3) Postoperative Management  POD#7 RLTCS  Incision well healing, meeting all postoperative goals    4) Postpartum Management  Breastpump to bedside    5) FEN  NPO  NS @ 75cc/hour    6) VTE  prophylaxis  SCDs in place  Heparin infusion as mentioned above    7) Disposition  Inpatient ICU    Discussed at multidisciplinary OBGYN Board Thania Klein MD  2019

## 2019-12-06 NOTE — LACTATION NOTE
Mom admitted to ICU for seizures  Pumping for baby boy DOL 6  Mom states she is pumping about 6 ounces each time  Enc Mom to continue pumping on schedule to protect supply  Mom also states baby has been being monitored closely for jaundice, on Tuesday the level was 16 2 (per Mom)  Family doctor  Told Mom to stop breastfeeding and switch to formula  Mom feels baby was latching well and having adequate output at home  Enc her to keep pumping and sending her milk home for the baby, ensuring he receives 2-3+ ounces at each feeding and is stooling 4+  and urinating 6+ times per day  Information on jaundice given   Ext provided and enc Mom to call for lactation support as needed while in the hospital

## 2019-12-06 NOTE — H&P
History and Physical - Critical Care   Johnny Tena 21 y o  female MRN: [id number]  Unit/Bed#: ED 09 Encounter: [id number]    Reason for Admission / Chief Complaint: seizure    History of Present Illness:  Johnny Tena is a 21 y o  female  with a past medical history of  alloimmune thrombocytopenia who is postop day 6 from an elective Caesarean section that presented to the ED after a witnessed grand mal seizure  The patient has no history of hypertension, no complications of for previous pregnancies, no gestational diabetes, and no history of preeclampsia/eclampsia  According to the patient she was at home in her usual state of health today holding her child went around 11:00 a m  She began to experience left arm numbness  Later that afternoon around 3:00 p m  She began to have jerking in her neck  She put her child down and at that point her significant other helped her laid down and noted convulsions began at that time  The patient was postictal afterwards, no reports of incontinence, but she did bite her tongue  Patient did have a spinal anesthesia and experienced a headache yesterday which resolved with Fioricet  In the ED patient was given 10 gm of magnesium and started on a magnesium infusion at 2 grams/hour, she was given 20 mg of labetalol for hypertensive emergency along with 2 mg of Ativan    Vital signs-temperature of 99 4°, tachycardic on admission at 1:26 a m  With a normal respiratory rate  Documented blood pressure of 162/106  Oxygen saturation 97%    Labs showed normal electrolytes with an elevated alk-phos, total protein, and hypoalbuminemia with significant proteinuria of 1 3 g  WBC with mild normocytic anemia  ABG with hypercapnic respiratory failure    History obtained from the patient   ______________________________________________________________________    Assessment:     1   Witnessed grand mal seizure- superior sagittal sinus thrombosis versus eclampsia of verses press syndrome  2  Superior sagittal sinus thrombosis- possibly related to hypercoagulable state of pregnancy versus underlying primary hypercoagulable disorder  3  Postpartum hypertensive emergency- likely related to cerebral dural thrombosis  4  Postop day 6 from Caesarean section  5  Alloimmune thrombocytopenia- on a prednisone taper  Completed 60 mg daily of prednisone during the last 6 weeks of pregnancy  Currently at 40 mg daily  6  Left upper extremity numbness  7  Occipital headache      Plan:    Neuro:   1  Grand-mal seizure-continue magnesium infusion  Ativan p r n  For any additional seizure activity  OBGYN on board    2  Superior sagittal sinus thrombosis- will initiate heparin drip, will obtain Neurology consult, will need MRI/MRA  Possible Hematology consult    CV:   1  Hypertensive emergency-continue with labetalol p r n  Push with goal of systolic blood pressure less than 160    Pulm:  No active issues, not requiring any supplemental oxygen    GI:  No active issues, bowel regimen  Elevated alk-phos, recheck LFTs tomorrow with CMP  Does have a history of cholecystectomy    :  Proteinuria likely related to hypertension and recent pregnancy  Check a UA    F/E/N:  Borderline hyponatremia  Avoid IV fluids while on a magnesium infusion  Repeat BMP in a m  ID:  No active issues, no signs of any infection, afebrile, no leukocytosis    Heme:  Superior sagittal sinus thrombosis possibly related to hypercoagulable state a pregnancy  Continue heparin drip  May need a hematology consult with a hypercoagulable panel    Autoimmune thrombocytopenia, on a prednisone taper currently at 40 mg daily  Resume current taper    Endo:  No active issues    Msk/Skin:  No active issues    Disposition:  Continue ICU monitoring      Counseling / Coordination of Care  Total Critical Care time spent 45 minutes excluding procedures, teaching and family updates  ______________________________________________________________________  Past Medical History:  Past Medical History:   Diagnosis Date     alloimmune thrombocytopenia     Obesity complicating pregnancy in second trimester 2019    Varicella     childhood       Past Surgical History:  Past Surgical History:   Procedure Laterality Date     SECTION      CHOLECYSTECTOMY          CHOLECYSTECTOMY LAPAROSCOPIC N/A 2017    Procedure: CHOLECYSTECTOMY LAPAROSCOPIC;  Surgeon: Ronaldo Figueroa MD;  [facility];  Service: General    GA  DELIVERY ONLY N/A 2019    Procedure: Lizzie Vela () REPEAT;  Surgeon: Mario Boyd MD;  [facility];  Service: Obstetrics       Past Family History:  Family History   Problem Relation Age of Onset    Arthritis Mother     Kidney disease Mother     Other Mother         thyroid, thalassemia, anemia    Diabetes Mother         type 2 insulin    Cancer Mother         lung, gist    Hypertension Father     No Known Problems Brother     Cancer Maternal Grandmother         lung    No Known Problems Son     Stroke Maternal Grandfather     Heart disease Maternal Grandfather         MI    No Known Problems Paternal Grandmother     Cancer Paternal Grandfather         brain    Diabetes Maternal Uncle         type 2 insulin       Social History:  Social History     Tobacco Use   Smoking Status Former Smoker    Packs/day: 0 50   Smokeless Tobacco Never Used   Tobacco Comment    1-2     Social History     Substance and Sexual Activity   Alcohol Use No    Frequency: Never     Social History     Substance and Sexual Activity   Drug Use No     Marital Status:   Exercise History: na    Medications:  Current Facility-Administered Medications   Medication Dose Route Frequency    magnesium sulfate 20 g/500 mL infusion (premix)  2 g/hr Intravenous Continuous     Home medications:  Prior to Admission medications Medication Sig Start Date End Date Taking? Authorizing Provider   acetaminophen (TYLENOL) 325 mg tablet Take 2 tablets (650 mg total) by mouth every 4 (four) hours as needed for mild pain 12/1/19  Yes Fabiola Cronin DO   cyclobenzaprine (FLEXERIL) 10 mg tablet Take 1 tablet (10 mg total) by mouth 3 (three) times a day as needed for muscle spasms 12/1/19  Yes Fabiola Cronin DO   docusate sodium (COLACE) 100 mg capsule Take 1 capsule (100 mg total) by mouth 2 (two) times a day 12/1/19  Yes Fabiola Cronin DO   ibuprofen (MOTRIN) 600 mg tablet Take 1 tablet (600 mg total) by mouth every 6 (six) hours as needed (cramping) 12/1/19  Yes Fabiola Cronin DO   oxyCODONE-acetaminophen (PERCOCET) 5-325 mg per tablet Take 1 tablet by mouth every 6 (six) hours as needed for severe pain for up to 10 daysMax Daily Amount: 4 tablets 12/1/19 12/11/19 Yes Fabiola Cronin DO   predniSONE 10 mg tablet Take 4 tablets (40 mg total) by mouth daily for 4 days Then 3 tabs/day for 5 days, then 2 tabs/day for 5 days, then 1 tab/day for 5 days, 1tab/every 2 days for 4 days 12/1/19 12/5/19 Yes Fabiola Cronin DO   Prenatal MV & Min w/FA-DHA (PRENATAL ADULT GUMMY/DHA/FA PO) Take by mouth daily   Yes Historical Provider, MD   ferrous sulfate 325 (65 Fe) mg tablet Take 325 mg by mouth daily with breakfast  12/5/19  Historical Provider, MD     Allergies: Allergies   Allergen Reactions    Bee Venom Anaphylaxis     Is suppose to carry EpiPen    Tobramycin      Pt states she is not sure if she is allergic to this        ROS:   Review of Systems   Constitutional: Negative for chills, diaphoresis, fatigue and fever  HENT: Negative for congestion, ear pain, postnasal drip, rhinorrhea, sneezing, trouble swallowing and voice change  Eyes: Negative for redness and itching  Respiratory: Negative for apnea, cough, choking, chest tightness, shortness of breath, wheezing and stridor      Cardiovascular: Negative for chest pain, palpitations and leg swelling  Gastrointestinal: Negative for abdominal distention, abdominal pain, blood in stool, constipation, diarrhea, nausea and vomiting  Endocrine: Negative for polydipsia and polyuria  Genitourinary: Negative for dysuria and flank pain  Musculoskeletal: Negative for arthralgias, back pain and joint swelling  Skin: Negative for pallor and rash  Neurological: Positive for weakness, numbness and headaches  Negative for dizziness, syncope and light-headedness  Left arm numbness   Hematological: Negative for adenopathy  Psychiatric/Behavioral: Negative for agitation  Vitals:  Vitals:    19 1715 19 1730 19 1800 19 1830   BP: 139/83 (!) 162/106 134/86 141/87   BP Location:    Right arm   Pulse: (!) 110 96 105 103   Resp:    Temp:       TempSrc:       SpO2: 98% 97% 97% 96%   Weight:       Height:         Temperature:   Temp (24hrs), Av 4 °F (37 4 °C), Min:99 4 °F (37 4 °C), Max:99 4 °F (37 4 °C)    Current Temperature: 99 4 °F (37 4 °C)    Weights:   IBW: 57 kg  Body mass index is 39 07 kg/m²  Hemodynamic Monitoring:  N/A     Non-Invasive/Invasive Ventilation Settings:  Respiratory    Lab Data (Last 4 hours)    None         O2/Vent Data (Last 4 hours)    None              No results found for: PHART, LVF0RRE, PO2ART, GLH6HYY, L5NVGMPR, BEART, SOURCE  SpO2: SpO2: 96 %     Physical Exam:  Physical Exam   Constitutional: No distress  HENT:   Head: Normocephalic and atraumatic  Mouth/Throat: No oropharyngeal exudate  Eyes: Pupils are equal, round, and reactive to light  Conjunctivae and EOM are normal  No scleral icterus  Neck: No JVD present  No tracheal deviation present  No thyromegaly present  Cardiovascular: Normal rate, regular rhythm, normal heart sounds and intact distal pulses  Exam reveals no gallop and no friction rub  No murmur heard  Pulmonary/Chest: Effort normal and breath sounds normal  No stridor  No respiratory distress   She has no wheezes  She has no rales  Abdominal: Soft  Bowel sounds are normal  She exhibits no distension and no mass  There is no tenderness  There is no guarding  Musculoskeletal: Normal range of motion  She exhibits no edema or deformity  Slight weakness in the left arm   Neurological: She displays normal reflexes  No cranial nerve deficit  Coordination normal    Skin: She is not diaphoretic  No erythema  No pallor  Psychiatric: She has a normal mood and affect  Labs:  Results from last 7 days   Lab Units 12/05/19  1700 11/30/19  0726 11/29/19  0848   WBC Thousand/uL 8 44 6 81 6 59   HEMOGLOBIN g/dL 11 0* 9 2* 12 1   HEMATOCRIT % 34 7* 29 0* 37 5   PLATELETS Thousands/uL 280 189 239   NEUTROS PCT % 86* 72  --    MONOS PCT % 2* 8  --      Results from last 7 days   Lab Units 12/05/19  1700   SODIUM mmol/L 135*   POTASSIUM mmol/L 5 0   CHLORIDE mmol/L 108   CO2 mmol/L 22   ANION GAP mmol/L 5   BUN mg/dL 14   CREATININE mg/dL 0 75   CALCIUM mg/dL 8 7   GLUCOSE RANDOM mg/dL 94   ALT U/L 26   AST U/L 45   ALK PHOS U/L 129*   ALBUMIN g/dL 2 1*   TOTAL BILIRUBIN mg/dL 0 38                       ABG:    VBG:            Imaging:  I have personally reviewed pertinent reports  and I have personally reviewed pertinent films in PACS  EKG: This was personally reviewed by myself  Micro: none          VTE Pharmacologic Prophylaxis: Heparin Drip  VTE Mechanical Prophylaxis: sequential compression device    Invasive lines and devices: Invasive Devices     Peripheral Intravenous Line            Peripheral IV 12/05/19 Left Hand less than 1 day    Peripheral IV 12/05/19 Right Wrist less than 1 day                Code Status: Prior  POA:    POLST:      Given critical illness, patient length of stay will require greater than two midnights  Portions of the record may have been created with voice recognition software    Occasional wrong word or "sound a like" substitutions may have occurred due to the inherent limitations of voice recognition software  Read the chart carefully and recognize, using context, where substitutions have occurred        Ion Chu MD

## 2019-12-06 NOTE — PROGRESS NOTES
Spoke with Jessica Rodriguez MD about testing deep tendon reflexes  MD states that DTR did not need to be tested on this patient

## 2019-12-07 ENCOUNTER — APPOINTMENT (INPATIENT)
Dept: RADIOLOGY | Facility: HOSPITAL | Age: 24
DRG: 776 | End: 2019-12-07
Payer: COMMERCIAL

## 2019-12-07 LAB
ALBUMIN SERPL BCP-MCNC: 2.2 G/DL (ref 3.5–5)
ALP SERPL-CCNC: 115 U/L (ref 46–116)
ALT SERPL W P-5'-P-CCNC: 19 U/L (ref 12–78)
ANION GAP SERPL CALCULATED.3IONS-SCNC: 6 MMOL/L (ref 4–13)
APTT PPP: 115 SECONDS (ref 23–37)
APTT PPP: 51 SECONDS (ref 23–37)
APTT PPP: 72 SECONDS (ref 23–37)
AST SERPL W P-5'-P-CCNC: 9 U/L (ref 5–45)
BILIRUB SERPL-MCNC: <0.1 MG/DL (ref 0.2–1)
BUN SERPL-MCNC: 15 MG/DL (ref 5–25)
CALCIUM SERPL-MCNC: 8.1 MG/DL (ref 8.3–10.1)
CARDIOLIPIN IGA SER IA-ACNC: <9 APL U/ML (ref 0–11)
CARDIOLIPIN IGG SER IA-ACNC: 17 GPL U/ML (ref 0–14)
CARDIOLIPIN IGM SER IA-ACNC: 27 MPL U/ML (ref 0–12)
CHLORIDE SERPL-SCNC: 108 MMOL/L (ref 100–108)
CO2 SERPL-SCNC: 25 MMOL/L (ref 21–32)
CREAT SERPL-MCNC: 0.66 MG/DL (ref 0.6–1.3)
ERYTHROCYTE [DISTWIDTH] IN BLOOD BY AUTOMATED COUNT: 13.8 % (ref 11.6–15.1)
GFR SERPL CREATININE-BSD FRML MDRD: 123 ML/MIN/1.73SQ M
GLUCOSE SERPL-MCNC: 140 MG/DL (ref 65–140)
HCT VFR BLD AUTO: 32.5 % (ref 34.8–46.1)
HGB BLD-MCNC: 10.4 G/DL (ref 11.5–15.4)
MCH RBC QN AUTO: 28.5 PG (ref 26.8–34.3)
MCHC RBC AUTO-ENTMCNC: 32 G/DL (ref 31.4–37.4)
MCV RBC AUTO: 89 FL (ref 82–98)
PLATELET # BLD AUTO: 318 THOUSANDS/UL (ref 149–390)
PMV BLD AUTO: 9.9 FL (ref 8.9–12.7)
POTASSIUM SERPL-SCNC: 4.1 MMOL/L (ref 3.5–5.3)
PROT SERPL-MCNC: 7.4 G/DL (ref 6.4–8.2)
RBC # BLD AUTO: 3.65 MILLION/UL (ref 3.81–5.12)
SODIUM SERPL-SCNC: 139 MMOL/L (ref 136–145)
WBC # BLD AUTO: 6.37 THOUSAND/UL (ref 4.31–10.16)

## 2019-12-07 PROCEDURE — 85730 THROMBOPLASTIN TIME PARTIAL: CPT | Performed by: INTERNAL MEDICINE

## 2019-12-07 PROCEDURE — 99233 SBSQ HOSP IP/OBS HIGH 50: CPT | Performed by: INTERNAL MEDICINE

## 2019-12-07 PROCEDURE — 99233 SBSQ HOSP IP/OBS HIGH 50: CPT | Performed by: PSYCHIATRY & NEUROLOGY

## 2019-12-07 PROCEDURE — 85730 THROMBOPLASTIN TIME PARTIAL: CPT | Performed by: PHYSICIAN ASSISTANT

## 2019-12-07 PROCEDURE — 86225 DNA ANTIBODY NATIVE: CPT | Performed by: INTERNAL MEDICINE

## 2019-12-07 PROCEDURE — 86147 CARDIOLIPIN ANTIBODY EA IG: CPT | Performed by: INTERNAL MEDICINE

## 2019-12-07 PROCEDURE — 80053 COMPREHEN METABOLIC PANEL: CPT | Performed by: INTERNAL MEDICINE

## 2019-12-07 PROCEDURE — 70450 CT HEAD/BRAIN W/O DYE: CPT

## 2019-12-07 PROCEDURE — NS001 PR NO SIGNATURE OR ATTESTATION: Performed by: OBSTETRICS & GYNECOLOGY

## 2019-12-07 PROCEDURE — 86038 ANTINUCLEAR ANTIBODIES: CPT | Performed by: INTERNAL MEDICINE

## 2019-12-07 PROCEDURE — 86039 ANTINUCLEAR ANTIBODIES (ANA): CPT | Performed by: INTERNAL MEDICINE

## 2019-12-07 PROCEDURE — 85027 COMPLETE CBC AUTOMATED: CPT | Performed by: INTERNAL MEDICINE

## 2019-12-07 RX ORDER — WARFARIN SODIUM 5 MG/1
5 TABLET ORAL
Status: COMPLETED | OUTPATIENT
Start: 2019-12-07 | End: 2019-12-07

## 2019-12-07 RX ORDER — METHOCARBAMOL 500 MG/1
500 TABLET, FILM COATED ORAL ONCE
Status: COMPLETED | OUTPATIENT
Start: 2019-12-07 | End: 2019-12-07

## 2019-12-07 RX ORDER — BACLOFEN 10 MG/1
5 TABLET ORAL ONCE
Status: DISCONTINUED | OUTPATIENT
Start: 2019-12-07 | End: 2019-12-07

## 2019-12-07 RX ORDER — BACLOFEN 10 MG/1
5 TABLET ORAL 3 TIMES DAILY
Status: DISCONTINUED | OUTPATIENT
Start: 2019-12-07 | End: 2019-12-07

## 2019-12-07 RX ORDER — LABETALOL 20 MG/4 ML (5 MG/ML) INTRAVENOUS SYRINGE
10 EVERY 6 HOURS PRN
Status: DISCONTINUED | OUTPATIENT
Start: 2019-12-07 | End: 2019-12-11 | Stop reason: HOSPADM

## 2019-12-07 RX ADMIN — METHOCARBAMOL TABLETS 500 MG: 500 TABLET, COATED ORAL at 10:06

## 2019-12-07 RX ADMIN — HEPARIN SODIUM AND DEXTROSE 11 UNITS/KG/HR: 10000; 5 INJECTION INTRAVENOUS at 15:15

## 2019-12-07 RX ADMIN — LEVETIRACETAM 750 MG: 100 INJECTION, SOLUTION INTRAVENOUS at 21:12

## 2019-12-07 RX ADMIN — ACETAMINOPHEN 650 MG: 325 TABLET ORAL at 07:36

## 2019-12-07 RX ADMIN — PREDNISONE 40 MG: 20 TABLET ORAL at 08:47

## 2019-12-07 RX ADMIN — ACETAMINOPHEN 650 MG: 325 TABLET ORAL at 00:46

## 2019-12-07 RX ADMIN — ACETAMINOPHEN 650 MG: 325 TABLET ORAL at 20:46

## 2019-12-07 RX ADMIN — WARFARIN SODIUM 5 MG: 5 TABLET ORAL at 19:43

## 2019-12-07 RX ADMIN — LEVETIRACETAM 750 MG: 100 INJECTION, SOLUTION INTRAVENOUS at 08:48

## 2019-12-07 RX ADMIN — SODIUM CHLORIDE 75 ML/HR: 0.9 INJECTION, SOLUTION INTRAVENOUS at 08:44

## 2019-12-07 RX ADMIN — HEPARIN SODIUM 4200 UNITS: 1000 INJECTION INTRAVENOUS; SUBCUTANEOUS at 20:01

## 2019-12-07 NOTE — PROGRESS NOTES
Daily Progress Note - Critical Care   John Pabon 21 y o  female MRN: [id number]  Unit/Bed#: MICU 13 Encounter: [id number]         ----------------------------------------------------------------------------------------  HPI/24hr events:  22 yo with new onset seizure, 6 days post CS  CT head revealed superior sagittal sinus thrombosis  Started on Heparin  Follow-up CT head this morning  Intermittent headache overnight  Neuro Exam was unchanged, no deficits  Patient states that she feels much better  ---------------------------------------------------------------------------------------  SUBJECTIVE    Review of Systems   Constitutional: Negative for chills, diaphoresis, fatigue and fever  Respiratory: Negative for cough, chest tightness, shortness of breath and wheezing  Cardiovascular: Negative for chest pain, palpitations and leg swelling  Gastrointestinal: Negative for abdominal distention, abdominal pain, nausea and vomiting  Neurological: Positive for headaches  Negative for dizziness, speech difficulty, weakness and numbness  Psychiatric/Behavioral: Negative for confusion, decreased concentration and dysphoric mood  ---------------------------------------------------------------------------------------  Assessment and Plan:    Neuro:    Diagnosis: Seizure- Secondary to superior sagittal sinus thrombosis  o Plan: Continue Keppra   Diagnosis: Superior Sagittal Sinus Thrombosis  o Plan: Repeat CT this morning  If CT is negative, transfer from heparin drip to warfarin  Q4 Neuro checks  CV:    Diagnosis: HTN  o Plan: Continue to monitor BP  Goal SBP <160  Pulm:   No acute issues    GI:  No active issues      :    No active issues      F/E/N:    No active issues      Heme/Onc:    No active issues      Endo:   o No active issues      ID:   No active issues      MSK/Skin:    No active issues- OOB as tolerated         Disposition: Transfer to Med-Surg   Code Status: Level 1 - Full Code  ---------------------------------------------------------------------------------------  ICU CORE MEASURES    Prophylaxis   VTE Pharmacologic Prophylaxis: Heparin Drip  VTE Mechanical Prophylaxis: sequential compression device  Stress Ulcer Prophylaxis: Prophylaxis Not Indicated       Invasive Devices Review  Invasive Devices     Peripheral Intravenous Line            Peripheral IV 19 Distal;Left;Ventral (anterior) Forearm less than 1 day    Peripheral IV 19 Right;Ventral (anterior) Forearm less than 1 day              ---------------------------------------------------------------------------------------  OBJECTIVE    Physical Exam   Constitutional: She is oriented to person, place, and time  She appears well-developed and well-nourished  Eyes: Pupils are equal, round, and reactive to light  EOM are normal    Cardiovascular: Normal rate and regular rhythm  Pulmonary/Chest: Effort normal and breath sounds normal    Musculoskeletal: Normal range of motion  Neurological: She is alert and oriented to person, place, and time  No cranial nerve deficit or sensory deficit  Coordination normal    Skin: Skin is warm and dry  Psychiatric: She has a normal mood and affect  Vitals   Vitals:    19 0400 19 0500 19 0600 19 0700   BP: 119/73 122/71  135/83   Pulse: 62 62 76 58   Resp: 18 20 20 21   Temp:       TempSrc:       SpO2: 96% 96% 97% 97%   Weight:       Height:         Temp (24hrs), Av 9 °F (37 2 °C), Min:98 3 °F (36 8 °C), Max:100 °F (37 8 °C)  Current: Temperature: 100 °F (37 8 °C)      Invasive/non-invasive ventilation settings   Respiratory    Lab Data (Last 4 hours)    None         O2/Vent Data (Last 4 hours)    None                Height and Weights   Height: 5' 5" (165 1 cm)  IBW: 57 kg  Body mass index is 38 78 kg/m²    Weight (last 2 days)     Date/Time   Weight    19 1930   106 (233 03)    19 1644   107 (234 79)                Intake and Output  I/O       12/05 0701 - 12/06 0700 12/06 0701 - 12/07 0700 12/07 0701 - 12/08 0700    P  O   840 420    I V  (mL/kg) 1297 2 (12 2) 1968 (18 6) 217 9 (2 1)    IV Piggyback 950      Total Intake(mL/kg) 2247 2 (21 2) 2808 (26 5) 637 9 (6)    Urine (mL/kg/hr) 1450 1270 (0 5)     Stool  0     Total Output 1450 1270     Net +797 2 +1538 +637 9           Unmeasured Urine Occurrence 1 x 4 x 1 x    Unmeasured Stool Occurrence  4 x             Nutrition       Diet Orders   (From admission, onward)             Start     Ordered    12/06/19 1149  Diet Regular; Regular House  Diet effective now     Question Answer Comment   Diet Type Regular    Regular Regular House    RD to adjust diet per protocol?  No        12/06/19 1149                Laboratory and Diagnostics:  Results from last 7 days   Lab Units 12/07/19 0228 12/06/19  1018 12/06/19  0446 12/06/19  0013 12/05/19  1700   WBC Thousand/uL 6 37 5 81 6 72 8 69 8 44   HEMOGLOBIN g/dL 10 4* 11 1* 10 1* 10 4* 11 0*   HEMATOCRIT % 32 5* 35 2 32 5* 32 1* 34 7*   PLATELETS Thousands/uL 318 290 240 266 280   NEUTROS PCT %  --   --   --   --  86*   MONOS PCT %  --   --   --   --  2*     Results from last 7 days   Lab Units 12/07/19 0228 12/06/19  1018 12/06/19  0446 12/06/19  0013 12/05/19  1700   SODIUM mmol/L 139 139 137 140 135*   POTASSIUM mmol/L 4 1 3 9 3 2* 3 2* 5 0   CHLORIDE mmol/L 108 109* 107 108 108   CO2 mmol/L 25 23 25 24 22   ANION GAP mmol/L 6 7 5 8 5   BUN mg/dL 15 8 8 10 14   CREATININE mg/dL 0 66 0 53* 0 54* 0 56* 0 75   CALCIUM mg/dL 8 1* 7 3* 7 2* 7 5* 8 7   GLUCOSE RANDOM mg/dL 140 88 100 97 94   ALT U/L 19 20 21 21 26   AST U/L 9 14 14 14 45   ALK PHOS U/L 115 126* 118* 122* 129*   ALBUMIN g/dL 2 2* 2 5* 2 0* 2 4* 2 1*   TOTAL BILIRUBIN mg/dL <0 10* 0 19* 0 21 0 13* 0 38     Results from last 7 days   Lab Units 12/06/19  0446 12/06/19  0150   MAGNESIUM mg/dL 5 0* 4 8*   PHOSPHORUS mg/dL 3 7  --       Results from last 7 days   Lab Units 12/07/19  0228 12/06/19  1911 12/06/19  1018 12/06/19  0150 12/05/19 2109   INR   --   --   --   --  1 10   PTT seconds 115* 57* 116* >210* 156*              ABG:    VBG:          Micro          Imaging:  I have personally reviewed pertinent reports  Active Medications  Scheduled Meds:  Current Facility-Administered Medications:  acetaminophen 650 mg Oral Q4H PRN Yolanda Thompson MD    heparin (porcine) 3-30 Units/kg/hr (Order-Specific) Intravenous Titrated Patti Charles, DO Last Rate: 11 Units/kg/hr (12/07/19 0430)   heparin (porcine) 4,200 Units Intravenous PRN Patti Charles, DO    heparin (porcine) 8,400 Units Intravenous PRN Patti Charles, DO    levETIRAcetam 750 mg Intravenous Q12H [facility] Yolanda Thompson MD Last Rate: 750 mg (12/06/19 2040)   predniSONE 40 mg Oral Daily Sena Marr MD    sodium chloride 75 mL/hr Intravenous Continuous Yolanda Thompson MD Last Rate: 75 mL/hr (12/06/19 2101)     Continuous Infusions:    heparin (porcine) 3-30 Units/kg/hr (Order-Specific) Last Rate: 11 Units/kg/hr (12/07/19 0430)   sodium chloride 75 mL/hr Last Rate: 75 mL/hr (12/06/19 2101)     PRN Meds:     acetaminophen 650 mg Q4H PRN   heparin (porcine) 4,200 Units PRN   heparin (porcine) 8,400 Units PRN       Allergies   Allergies   Allergen Reactions    Bee Venom Anaphylaxis     Is suppose to carry EpiPen    Tobramycin      Pt states she is not sure if she is allergic to this        Advance Directive and Living Will:      Power of :    POLST:        Counseling / Coordination of [address], CRNP        Portions of the record may have been created with voice recognition software  Occasional wrong word or "sound a like" substitutions may have occurred due to the inherent limitations of voice recognition software    Read the chart carefully and recognize, using context, where substitutions have occurred

## 2019-12-07 NOTE — PROGRESS NOTES
Code Status: Level 1 - Full Code  POA:    POLST:      Reason for ICU admission:   New onset seizures, Superior sagittal sinus thrombosis  Active problems:   Principal Problem: Thrombosis of superior sagittal sinus  Active Problems:    Alloimmune thrombocytopenia during pregnancy, antepartum    Status post  delivery    Seizure [facility])    Hypertension complicating pregnancy, delivered, current hospitalization  Resolved Problems:    * No resolved hospital problems  *      Consultants:   OB, Vascular nuerology    History of Present Illness:   20 yo female presenting with new onset seizures and left sided weakness  Onset of symptoms was 6 days post   No history of hypertension, preeclampsia, or seizures  Summary of clinical course:   MRI/V Head - thrombus in superior sagittal sinus as well as bilateral cortical veins, significant congestion within smaller draining veins R>L, noted vasogenic edema without infarct, mild right parietal gyral swelling     CT head/neck w and w/o contrast - superior sagittal sinus thrombosis    She was started on heparin drip  No neuro deficts as of   CT  was negative for hemmorage  Plan to transfer to warfarin         Recent or scheduled procedures:   CT head- completed  Outstanding/pending diagnostics:   Myoglobin Urine,  Cardiolipin, MORENA screen, Anti-DNA    Cultures:   NA       Mobilization Plan:   OOB as tolerated    Nutrition Plan:   Regular diet    VTE Pharmacologic Prophylaxis: Heparin Drip  VTE Mechanical Prophylaxis: sequential compression device    Discharge Plan:   Patient should be ready for discharge to Home  after Warfarin is therapeutic, after cleared by Hospitalist, Cleared by Neuro  Home medications that are not reordered and reason why:         Spoke with Edgard Peña  regarding transfer  Please call [phone] with any questions or concerns  Portions of the record may have been created with voice recognition software    Occasional wrong word or "sound a like" substitutions may have occurred due to the inherent limitations of voice recognition software  Read the chart carefully and recognize, using context, where substitutions have occurred

## 2019-12-07 NOTE — PROGRESS NOTES
Progress Note - OB/GYN   LATE ENTRY - evaluated pt at approximately 0730 AM  Josef Bui 21 y o  female MRN: [id number]  Unit/Bed#:  312-01 Encounter: [id number]    Assessment:  Cecile Tolliver is a 23yo G3 now  s/p RLTCS POD#8, admitted with superior sagittal sinus thrombosis    Plan:  Superior sagittal sinus thrombosis:  - s/p grand mal seizure secondary to above  - Transferred out of MICU today to floor  - CT repeated today  - Management per SLIM    RLTCS:  - POD#8  - Cont routine postoperative care  - Encourage ambulation as tolerated with care  - Tylenol PRN for pain     alloimmune thrombocytopenia:  - s/p IVIG, solumedrol  - Cont prednisone taper    DVT Ppx:   - Heparin GTT, per ICU note may consider transition to warfarin today  Warfarin is considered safe in breastfeeding  Subjective/Objective   Chief Complaint: I really miss my baby    Subjective: Pt reports that she is physically feeling better but would like to be in a place in the hospital where she can see her baby  No physical complaints at this time  Does report that she has persistent occasional headaches that are primarily frontal in location, but occasionally will also have upper neck and base of skull discomfort  Denies CP, SOB  Occasional cramping  Reports bleeding has increased modestly with the heparin GTT, but denies passing clots or saturating pads  Objective:   Vitals: Blood pressure 140/80, pulse 64, temperature 99 °F (37 2 °C), temperature source Oral, resp  rate 18, height 5' 5" (1 651 m), weight 106 kg (233 lb 0 4 oz), last menstrual period 2019, SpO2 98 %, currently breastfeeding  ,Body mass index is 38 78 kg/m²        Intake/Output Summary (Last 24 hours) at 2019 1514  Last data filed at 2019 0701  Gross per 24 hour   Intake 3445 93 ml   Output    Net 3445 93 ml       Invasive Devices     Peripheral Intravenous Line            Peripheral IV 19 Distal;Left;Ventral (anterior) Forearm 1 day Peripheral IV 12/06/19 Right;Ventral (anterior) Forearm 1 day                Physical Exam:   Gen: AaOx3, NAD, pleasant, cooperative  Card: RRR, no murmurs, rubs, or gallops  Pulm: CTAB, no wheezes, rales, or rhonchi  Good inspiratory effort  Abd: Soft, nontender, nondistended  Incision is clean, dry, and intact, appears to be well healed  : Fundus firm, nontender, located at -4cm below umbilicus  Extremities: No edema, nontender, Negative Kathleen's bilaterally  Wearing SCDs bilaterally      Lab, Imaging and other studies: I have personally reviewed pertinent reports              Chris Gomez DO  OB/GYN, PGY4  12/7/2019, 3:33 PM

## 2019-12-07 NOTE — PROGRESS NOTES
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 with adjacent parietal cortical vein thrombosis and surrounding R parietal edema complicated by seizure  Unclear etiology of CVT - potentially post-partum hypercoagulability vs  underlying hypercoagulable/autoimmune disease  Plan:  -MRI and CTA reviewed - superior sagittal thrombosis with adjacent cortical vein thrombosis and edema, no clear infarct/hemorrhage  -HCT reviewed - hypodensity in R parietal region, likely due to edema rather than infarct given exam; no hemorrhage  -continue heparin gtt, lower PTT goal of 50-70 given risk of hemorrhage  -she has demonstrated stability on therapeutic heparin thus far, may begin bridging to warfarin  -once INR is therapeutic, would check HCT again prior to discharge  -close neurochecks with stat CT with any changes  -continue Keppra 750mg BID  -no indication for EEG at this time  -thrombosis panel pending; ATIII normal  -MORENA pending; recommend checking ANCA as well  -will need at least 3-6 months of AC with plan to repeat MRV in about 3 months; may need lifelong if evidence of underlying hypercoagulable state emerges  -will need follow-up with stroke clinic as outpatient      Subjective:   Feels well this AM with exception of headache, which she states is worse when standing/sitting up although now improved  No new deficits/symptoms  Received HCT this AM that was stable without evidence of hemorrhage  No further seizure activity  ROS:  Headache, otherwise negative per 12-point review  Vitals: Blood pressure 139/83, pulse 74, temperature 99 °F (37 2 °C), temperature source Oral, resp  rate 22, height 5' 5" (1 651 m), weight 106 kg (233 lb 0 4 oz), last menstrual period 03/09/2019, SpO2 98 %, currently breastfeeding  ,Body mass index is 38 78 kg/m²      Physical Exam: General appearance: alert and oriented, in no acute distress  Head: Normocephalic, without obvious abnormality, atraumatic  Eyes: conjunctivae/corneas clear  PERRL, EOM's intact  Fundi benign  Neck: no adenopathy, no carotid bruit, no JVD and supple, symmetrical, trachea midline  Lungs: clear to auscultation bilaterally  Heart: regular rate and rhythm, S1, S2 normal, no murmur, click, rub or gallop  Abdomen: soft, non-tender; bowel sounds normal; no masses,  no organomegaly  Extremities: extremities normal, warm and well-perfused; no cyanosis, clubbing, or edema    Neurologic exam:  Awake and alert with appropriate mental status and language function  No visual, EOM, or facial deficit  Motor testing reveals 5/5 strength in the bilateral upper and lower extremities  Sensation is intact to light touch in the bilateral upper and lower extremities  Coordination testing is unremarkable  Lab, Imaging and other studies:   CBC:   Results from last 7 days   Lab Units 12/07/19 0228 12/06/19  1018 12/06/19  0446   WBC Thousand/uL 6 37 5 81 6 72   RBC Million/uL 3 65* 4 00 3 58*   HEMOGLOBIN g/dL 10 4* 11 1* 10 1*   HEMATOCRIT % 32 5* 35 2 32 5*   MCV fL 89 88 91   PLATELETS Thousands/uL 318 290 240   , BMP/CMP:   Results from last 7 days   Lab Units 12/07/19 0228 12/06/19  1018 12/06/19  0446   SODIUM mmol/L 139 139 137   POTASSIUM mmol/L 4 1 3 9 3 2*   CHLORIDE mmol/L 108 109* 107   CO2 mmol/L 25 23 25   BUN mg/dL 15 8 8   CREATININE mg/dL 0 66 0 53* 0 54*   CALCIUM mg/dL 8 1* 7 3* 7 2*   AST U/L 9 14 14   ALT U/L 19 20 21   ALK PHOS U/L 115 126* 118*   EGFR ml/min/1 73sq m 123 132 132   , Coagulation:   Results from last 7 days   Lab Units 12/05/19  2109   INR  1 10     VTE Prophylaxis: Heparin    Counseling / Coordination of Care  Total Critical Care time spent 30 minutes excluding procedures, teaching and family updates

## 2019-12-07 NOTE — PROGRESS NOTES
Progress Note - Pedro Winn 1995, 21 y o  female MRN: [id number]    Unit/Bed#: -01 Encounter: [id number]    Primary Care Provider: Catalina Murray DO   Date and time admitted to hospital: 2019  4:23 PM        Seizure [facility])  Assessment & Plan  Continue Keppra, Q4 neuro checks, Neurology following  Status post  delivery  Assessment & Plan  OB following  Routine post partium care     Alloimmune thrombocytopenia during pregnancy, antepartum  Assessment & Plan  History of, OB following    * Thrombosis of superior sagittal sinus  Assessment & Plan  On Heparin, Q4 neuro checks, Vascular Neurology following    Planning to transition to warfarin today  Reason for ICU admission:   New onset seizures, Superior sagittal sinus thrombosis      Active problems:   Principal Problem: Thrombosis of superior sagittal sinus  Active Problems:    Alloimmune thrombocytopenia during pregnancy, antepartum    Status post  delivery    Seizure [facility])    Hypertension complicating pregnancy, delivered, current hospitalization  Resolved Problems:    * No resolved hospital problems  *        Consultants:   OB, Vascular nuerology     History of Present Illness:   22 yo female, [id number], presenting with new onset seizures and left sided weakness  Onset of symptoms was 6 days post   Per ED report had left sided paralysis on presentation  Anant Parra witnessed, no incontinence, + post ictal state, + tongue laceration  No history of hypertension, preeclampsia, or seizures  Summary of clinical course:   Treated initially for Eclampsia, however CT head revealed superior sagittal sinus thrombosis  MRI/V Head revealed thrombus in superior sagittal sinus as well as bilateral cortical veins, significant congestion within smaller draining veins R>L, noted vasogenic edema without infarct, mild right parietal gyral swelling  She was started on heparin drip and keppra  No neuro deficts as of    CT 12/7 was negative for hemmorage  Plan to transfer to warfarin          Recent or scheduled procedures:   CT head- completed       Outstanding/pending diagnostics:   Myoglobin Urine,  Cardiolipin, MORENA screen, Anti-DNA     Cultures:   NA                                        Mobilization Plan:   OOB as tolerated     Nutrition Plan:   Regular diet     VTE Pharmacologic Prophylaxis: Heparin Drip  VTE Mechanical Prophylaxis: sequential compression device    Discharge Plan:   Patient should be ready for discharge to Home  after Warfarin is therapeutic, after cleared by Hospitalist, Cleared by Neuro          Home medications that are not reordered and reason why:            Spoke with Marcha Gitelman  regarding transfer  Please call [phone] with any questions or concerns

## 2019-12-07 NOTE — ASSESSMENT & PLAN NOTE
On Heparin, Q4 neuro checks, Vascular Neurology following    Planning to transition to warfarin today

## 2019-12-08 LAB
ANION GAP SERPL CALCULATED.3IONS-SCNC: 5 MMOL/L (ref 4–13)
APTT PPP: 103 SECONDS (ref 23–37)
APTT PPP: 43 SECONDS (ref 23–37)
APTT PPP: 50 SECONDS (ref 23–37)
APTT PPP: 59 SECONDS (ref 23–37)
BASOPHILS # BLD AUTO: 0.03 THOUSANDS/ΜL (ref 0–0.1)
BASOPHILS NFR BLD AUTO: 1 % (ref 0–1)
BUN SERPL-MCNC: 13 MG/DL (ref 5–25)
CALCIUM SERPL-MCNC: 8.7 MG/DL (ref 8.3–10.1)
CHLORIDE SERPL-SCNC: 110 MMOL/L (ref 100–108)
CO2 SERPL-SCNC: 26 MMOL/L (ref 21–32)
CREAT SERPL-MCNC: 0.6 MG/DL (ref 0.6–1.3)
EOSINOPHIL # BLD AUTO: 0.11 THOUSAND/ΜL (ref 0–0.61)
EOSINOPHIL NFR BLD AUTO: 2 % (ref 0–6)
ERYTHROCYTE [DISTWIDTH] IN BLOOD BY AUTOMATED COUNT: 13.7 % (ref 11.6–15.1)
GFR SERPL CREATININE-BSD FRML MDRD: 127 ML/MIN/1.73SQ M
GLUCOSE SERPL-MCNC: 89 MG/DL (ref 65–140)
HCT VFR BLD AUTO: 32.9 % (ref 34.8–46.1)
HGB BLD-MCNC: 10.4 G/DL (ref 11.5–15.4)
IMM GRANULOCYTES # BLD AUTO: 0.04 THOUSAND/UL (ref 0–0.2)
IMM GRANULOCYTES NFR BLD AUTO: 1 % (ref 0–2)
INR PPP: 0.98 (ref 0.84–1.19)
LYMPHOCYTES # BLD AUTO: 2.3 THOUSANDS/ΜL (ref 0.6–4.47)
LYMPHOCYTES NFR BLD AUTO: 42 % (ref 14–44)
MAGNESIUM SERPL-MCNC: 1.8 MG/DL (ref 1.6–2.6)
MCH RBC QN AUTO: 28.3 PG (ref 26.8–34.3)
MCHC RBC AUTO-ENTMCNC: 31.6 G/DL (ref 31.4–37.4)
MCV RBC AUTO: 89 FL (ref 82–98)
MONOCYTES # BLD AUTO: 0.39 THOUSAND/ΜL (ref 0.17–1.22)
MONOCYTES NFR BLD AUTO: 7 % (ref 4–12)
NEUTROPHILS # BLD AUTO: 2.55 THOUSANDS/ΜL (ref 1.85–7.62)
NEUTS SEG NFR BLD AUTO: 47 % (ref 43–75)
NRBC BLD AUTO-RTO: 0 /100 WBCS
PHOSPHATE SERPL-MCNC: 5 MG/DL (ref 2.7–4.5)
PLATELET # BLD AUTO: 276 THOUSANDS/UL (ref 149–390)
PMV BLD AUTO: 9.8 FL (ref 8.9–12.7)
POTASSIUM SERPL-SCNC: 3.9 MMOL/L (ref 3.5–5.3)
PROTHROMBIN TIME: 12.6 SECONDS (ref 11.6–14.5)
RBC # BLD AUTO: 3.68 MILLION/UL (ref 3.81–5.12)
SODIUM SERPL-SCNC: 141 MMOL/L (ref 136–145)
WBC # BLD AUTO: 5.42 THOUSAND/UL (ref 4.31–10.16)

## 2019-12-08 PROCEDURE — 80048 BASIC METABOLIC PNL TOTAL CA: CPT | Performed by: PHYSICIAN ASSISTANT

## 2019-12-08 PROCEDURE — 99231 SBSQ HOSP IP/OBS SF/LOW 25: CPT | Performed by: OBSTETRICS & GYNECOLOGY

## 2019-12-08 PROCEDURE — 85730 THROMBOPLASTIN TIME PARTIAL: CPT | Performed by: PHYSICIAN ASSISTANT

## 2019-12-08 PROCEDURE — 99232 SBSQ HOSP IP/OBS MODERATE 35: CPT | Performed by: FAMILY MEDICINE

## 2019-12-08 PROCEDURE — 85610 PROTHROMBIN TIME: CPT | Performed by: INTERNAL MEDICINE

## 2019-12-08 PROCEDURE — 99232 SBSQ HOSP IP/OBS MODERATE 35: CPT | Performed by: PSYCHIATRY & NEUROLOGY

## 2019-12-08 PROCEDURE — 83735 ASSAY OF MAGNESIUM: CPT | Performed by: PHYSICIAN ASSISTANT

## 2019-12-08 PROCEDURE — 84100 ASSAY OF PHOSPHORUS: CPT | Performed by: PHYSICIAN ASSISTANT

## 2019-12-08 PROCEDURE — 85025 COMPLETE CBC W/AUTO DIFF WBC: CPT | Performed by: PHYSICIAN ASSISTANT

## 2019-12-08 RX ORDER — LEVETIRACETAM 750 MG/1
750 TABLET ORAL EVERY 12 HOURS SCHEDULED
Status: DISCONTINUED | OUTPATIENT
Start: 2019-12-08 | End: 2019-12-11 | Stop reason: HOSPADM

## 2019-12-08 RX ORDER — SIMETHICONE 80 MG
80 TABLET,CHEWABLE ORAL EVERY 6 HOURS PRN
Status: DISCONTINUED | OUTPATIENT
Start: 2019-12-08 | End: 2019-12-11 | Stop reason: HOSPADM

## 2019-12-08 RX ORDER — BACLOFEN 10 MG/1
10 TABLET ORAL EVERY 8 HOURS PRN
Status: DISCONTINUED | OUTPATIENT
Start: 2019-12-08 | End: 2019-12-11 | Stop reason: HOSPADM

## 2019-12-08 RX ORDER — WARFARIN SODIUM 5 MG/1
5 TABLET ORAL
Status: COMPLETED | OUTPATIENT
Start: 2019-12-08 | End: 2019-12-08

## 2019-12-08 RX ADMIN — HEPARIN SODIUM 4200 UNITS: 1000 INJECTION INTRAVENOUS; SUBCUTANEOUS at 20:44

## 2019-12-08 RX ADMIN — WARFARIN SODIUM 5 MG: 5 TABLET ORAL at 18:22

## 2019-12-08 RX ADMIN — ACETAMINOPHEN 650 MG: 325 TABLET ORAL at 09:09

## 2019-12-08 RX ADMIN — BACLOFEN 10 MG: 10 TABLET ORAL at 09:09

## 2019-12-08 RX ADMIN — PREDNISONE 40 MG: 20 TABLET ORAL at 09:08

## 2019-12-08 RX ADMIN — ACETAMINOPHEN 650 MG: 325 TABLET ORAL at 04:55

## 2019-12-08 RX ADMIN — LEVETIRACETAM 750 MG: 750 TABLET ORAL at 21:09

## 2019-12-08 RX ADMIN — LEVETIRACETAM 750 MG: 100 INJECTION, SOLUTION INTRAVENOUS at 09:09

## 2019-12-08 RX ADMIN — BACLOFEN 10 MG: 10 TABLET ORAL at 21:28

## 2019-12-08 RX ADMIN — ACETAMINOPHEN 650 MG: 325 TABLET ORAL at 18:24

## 2019-12-08 RX ADMIN — HEPARIN SODIUM AND DEXTROSE 9 UNITS/KG/HR: 10000; 5 INJECTION INTRAVENOUS at 17:02

## 2019-12-08 RX ADMIN — SIMETHICONE CHEW TAB 80 MG 80 MG: 80 TABLET ORAL at 04:56

## 2019-12-08 NOTE — ASSESSMENT & PLAN NOTE
No repeat seizure activity since admission  Seizure most likely subsequent to CVT of superior sagittal sinus  Neuro consulted on admission and following  Continue Keppra 750mg BID

## 2019-12-08 NOTE — ASSESSMENT & PLAN NOTE
POD#8 RLTCS  Ob/Gyn consulted on admission and following  Pt is meeting all postop goals    Per Ob/Gyn: continue routine postpartum/postoperative care  Tylenol PRN for pain

## 2019-12-08 NOTE — PROGRESS NOTES
Gynecology Progress note   Josef Bui 21 y o  female MRN: [id number]  Unit/Bed#: -01 Encounter: [id number]    Josef Bui has is resting comfortably in bed this morning sleeping  No acute events overnight  /86   Pulse 66   Temp 98 2 °F (36 8 °C) (Oral)   Resp 18   Ht 5' 5" (1 651 m)   Wt 106 kg (233 lb 0 4 oz)   LMP 2019 (Exact Date)   SpO2 99%   BMI 38 78 kg/m²     I/O last 3 completed shifts: In: 3291 2 [P O :1260; I V : 2]  Out: -   No intake/output data recorded  Lab Results   Component Value Date    WBC 5 42 2019    HGB 10 4 (L) 2019    HCT 32 9 (L) 2019    MCV 89 2019     2019       Lab Results   Component Value Date    CALCIUM 8 7 2019    K 3 9 2019    CO2 26 2019     (H) 2019    BUN 13 2019    CREATININE 0 60 2019       No results found for: POCGLU    Physical Exam  Physical Exam   Constitutional: She appears well-developed and well-nourished  No distress  Patient sleeping on my examination, I did not awaken her   Pulmonary/Chest: Effort normal  No respiratory distress  Skin: She is not diaphoretic  Assessment / Plan:  Josef Bui is a 21 y o   status post scheduled, uncomplicated repeat  section POD#9, admitted with superior sinus thrombosis  #1 Superior sagittal sinus thrombosis:   Status post grand mal seizure prior to admission  Status post CT/CTA/MRI/MRV which confirmed the above mentioned findings  Neurology following  CT repeated yesterday: no acute hemorrhage seen, evolving changes from venous sinus     Continue heparin drip, Warfarin started 12/7  Continue management per SLIM and Neurology    #2 Postoperative / Postpartum Management  POD#8 RLTCS, meeting all postoperative goals  Continue routine postpartum/postoperative care  Tylenol PRN for pain  Breast pump to bedside    #3  alloimmune thrombocytopenia  Status post IVIG, Solumedrol  Continue Prednisone taper    #4 FEN  Regular diet    #5 VTE prophylaxis  Heparin GTT as above, started on Warfarin 12/7    #6 Disposition  Inpatient    Aditya Varela MD  OBGYN PGY4  12/8/2019

## 2019-12-08 NOTE — PROGRESS NOTES
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 with adjacent parietal cortical vein thrombosis and surrounding R parietal edema complicated by seizure  Unclear etiology of CVT - potentially post-partum hypercoagulability vs  underlying hypercoagulable/autoimmune disease      Plan:  -MRI and CTA reviewed - superior sagittal thrombosis with adjacent cortical vein thrombosis and edema, no clear infarct/hemorrhage  -HCT reviewed - hypodensity in R parietal region, likely due to edema rather than infarct given exam; no hemorrhage  -continue heparin gtt, lower PTT goal of 50-70 given risk of hemorrhage  -warfarin initiated last night - once INR is therapeutic, would check HCT again prior to discharge  -close neurochecks with stat CT with any changes  -continue Keppra 750mg BID  -no indication for EEG at this time  -thrombosis panel - ATIII normal, anticardiolipin IgG indeterminate and IgM low positive; rest pending  -CRP, MORENA, ANCA pending  -will need at least 3-6 months of AC with plan to repeat MRV in about 3 months; may need lifelong if evidence of underlying hypercoagulable state emerges  -will need follow-up with stroke clinic as outpatient      Subjective:   No headache this morning but does describe headache when sitting up or standing  No new complaints; denies any new deficit  Initiated on warfarin last night after stable HCT for bridging  No further seizure activity  Tolerating Keppra well  ROS:  Headache, otherwise negative per 12-point review  Vitals: Blood pressure 143/94, pulse 55, temperature 98 4 °F (36 9 °C), temperature source Oral, resp  rate 18, height 5' 5" (1 651 m), weight 106 kg (233 lb 0 4 oz), last menstrual period 03/09/2019, SpO2 96 %, currently breastfeeding  ,Body mass index is 38 78 kg/m²      Physical Exam: General appearance: alert and oriented, in no acute distress  Head: Normocephalic, without obvious abnormality, atraumatic  Eyes: conjunctivae/corneas clear  PERRL, EOM's intact  Fundi benign  Neck: no adenopathy, no carotid bruit, no JVD and supple, symmetrical, trachea midline  Lungs: clear to auscultation bilaterally  Heart: regular rate and rhythm, S1, S2 normal, no murmur, click, rub or gallop  Abdomen: soft, non-tender; bowel sounds normal; no masses,  no organomegaly  Extremities: extremities normal, warm and well-perfused; no cyanosis, clubbing, or edema     Neurologic exam:  Awake and alert with appropriate mental status and language function  No visual, EOM, or facial deficit  Motor testing reveals 5/5 strength in the bilateral upper and lower extremities with no drift  Sensation is intact to light touch in the bilateral upper and lower extremities  Coordination testing is unremarkable      Lab, Imaging and other studies:   CBC:   Results from last 7 days   Lab Units 12/08/19  0616 12/07/19  0228 12/06/19  1018   WBC Thousand/uL 5 42 6 37 5 81   RBC Million/uL 3 68* 3 65* 4 00   HEMOGLOBIN g/dL 10 4* 10 4* 11 1*   HEMATOCRIT % 32 9* 32 5* 35 2   MCV fL 89 89 88   PLATELETS Thousands/uL 276 318 290   , BMP/CMP:   Results from last 7 days   Lab Units 12/08/19  0616 12/07/19 0228 12/06/19  1018 12/06/19  0446   SODIUM mmol/L 141 139 139 137   POTASSIUM mmol/L 3 9 4 1 3 9 3 2*   CHLORIDE mmol/L 110* 108 109* 107   CO2 mmol/L 26 25 23 25   BUN mg/dL 13 15 8 8   CREATININE mg/dL 0 60 0 66 0 53* 0 54*   CALCIUM mg/dL 8 7 8 1* 7 3* 7 2*   AST U/L  --  9 14 14   ALT U/L  --  19 20 21   ALK PHOS U/L  --  115 126* 118*   EGFR ml/min/1 73sq m 127 123 132 132   , Coagulation:   Results from last 7 days   Lab Units 12/08/19 0616   INR  0 98     VTE Prophylaxis: Warfarin (Coumadin)

## 2019-12-08 NOTE — PROGRESS NOTES
Progress Note - Joana Nageotte 1995, 21 y o  female MRN: [id number]    Unit/Bed#: -01 Encounter: [id number]    Primary Care Provider: Jus Tena DO   Date and time admitted to hospital: 2019  4:23 PM        * Thrombosis of superior sagittal sinus  Assessment & Plan  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 and surrounding R parietal edema complicated by seizure  Unclear etiology of CVT - potentially post-partum hypercoagulability vs  underlying hypercoagulable/autoimmune disease  Neurology consulted on admission and following   -Patient's symptoms (LUE weakness numbness) have completely resolved  -MRI and CTA reviewed - superior sagittal thrombosis with adjacent cortical vein thrombosis and edema, no clear infarct/hemorrhage  -HCT- no hemorrhage  -Per Neuro's recs: continue heparin gtt, lower PTT goal of 50-70 given risk of hemorrhage  -Will continue to bridge to coumadin- once INR is therapeutic, neuro recommends repeat Head CT  -Continue close neurochecks with stat CT with any changes  -continue Keppra 750mg BID  -no indication for EEG at this time  -thrombosis panel - ATIII normal, anticardiolipin IgG indeterminate and IgM low positive; rest pending  -CRP, MORENA, ANCA pending  - Per neuro: will need at least 3-6 months of AC with plan to repeat MRV in about 3 months; may need lifelong if evidence of underlying hypercoagulable state emerges  -will need follow-up with stroke clinic as outpatient    Seizure [facility])  Assessment & Plan  No repeat seizure activity since admission  Seizure most likely subsequent to CVT of superior sagittal sinus  Neuro consulted on admission and following  Continue Keppra 750mg BID  Status post  delivery  Assessment & Plan  POD#8 RLTCS  Ob/Gyn consulted on admission and following  Pt is meeting all postop goals    Per Ob/Gyn: continue routine postpartum/postoperative care  Tylenol PRN for pain      Alloimmune thrombocytopenia during pregnancy, antepartum  Assessment & Plan  Status post IVIG, Solumedrol  Continue Prednisone taper        VTE Pharmacologic Prophylaxis:   Pharmacologic: Heparin Drip  Mechanical VTE Prophylaxis in Place: Yes    Patient Centered Rounds: I have performed bedside rounds with nursing staff today  Discussions with Specialists or Other Care Team Provider:     Education and Discussions with Family / Patient: discussed with pt    Time Spent for Care: 30 minutes  More than 50% of total time spent on counseling and coordination of care as described above  Current Length of Stay: 3 day(s)    Current Patient Status: Inpatient   Certification Statement: The patient will continue to require additional inpatient hospital stay due to heparin bridging to coumadin until therapeutic INR    Discharge Plan: home when INR therapeutic    Code Status: Level 1 - Full Code      Subjective:   Pt seen and examined at bedside  Pt doing very well  Has no complaints  Still gets occasional headache if she moves too quickly, but denies any HA at present  Pt has regained all strength and sensation in her LUE and the numbness, tingling have completely resolved  Pt is concerned about post-hospital course  Has questions regarding driving restrictions and short term disability  Pt works full time at [employer]  Objective:     Vitals:   Temp (24hrs), Av 3 °F (36 8 °C), Min:98 2 °F (36 8 °C), Max:98 6 °F (37 °C)    Temp:  [98 2 °F (36 8 °C)-98 6 °F (37 °C)] 98 3 °F (36 8 °C)  HR:  [55-66] 66  Resp:  [18-20] 18  BP: (143-154)/(80-94) 146/89  SpO2:  [96 %-99 %] 98 %  Body mass index is 38 78 kg/m²  Input and Output Summary (last 24 hours):     No intake or output data in the 24 hours ending 19 5769    Physical Exam:     Physical Exam   Constitutional: She is oriented to person, place, and time  She appears well-nourished  No distress     HENT:   Head: Normocephalic and atraumatic  Eyes: Pupils are equal, round, and reactive to light  EOM are normal    Neck: Normal range of motion  Neck supple  Cardiovascular: Normal rate, regular rhythm and normal heart sounds  Pulmonary/Chest: Effort normal and breath sounds normal    Abdominal: Soft  Bowel sounds are normal    Musculoskeletal: Normal range of motion  Neurological: She is alert and oriented to person, place, and time  No cranial nerve deficit  Skin: She is not diaphoretic  Abdominal  Incision is clean, dry, and intact, appears to be well healed   Vitals reviewed  Additional Data:     Labs:    Results from last 7 days   Lab Units 12/08/19  0616   WBC Thousand/uL 5 42   HEMOGLOBIN g/dL 10 4*   HEMATOCRIT % 32 9*   PLATELETS Thousands/uL 276   NEUTROS PCT % 47   LYMPHS PCT % 42   MONOS PCT % 7   EOS PCT % 2     Results from last 7 days   Lab Units 12/08/19  0616 12/07/19  0228   SODIUM mmol/L 141 139   POTASSIUM mmol/L 3 9 4 1   CHLORIDE mmol/L 110* 108   CO2 mmol/L 26 25   BUN mg/dL 13 15   CREATININE mg/dL 0 60 0 66   ANION GAP mmol/L 5 6   CALCIUM mg/dL 8 7 8 1*   ALBUMIN g/dL  --  2 2*   TOTAL BILIRUBIN mg/dL  --  <0 10*   ALK PHOS U/L  --  115   ALT U/L  --  19   AST U/L  --  9   GLUCOSE RANDOM mg/dL 89 140     Results from last 7 days   Lab Units 12/08/19  0616   INR  0 98         Results from last 7 days   Lab Units 12/06/19  0446   HEMOGLOBIN A1C % 5 7               * I Have Reviewed All Lab Data Listed Above  * Additional Pertinent Lab Tests Reviewed:  [facility] Admission Reviewed    Imaging:    Imaging Reports Reviewed Today Include: Head CT, MRI      Recent Cultures (last 7 days):           Last 24 Hours Medication List:     Current Facility-Administered Medications:  acetaminophen 650 mg Oral Q4H PRN Norma Soto PA-C    baclofen 10 mg Oral Q8H PRN Hayley Davis PA-C    heparin (porcine) 3-30 Units/kg/hr (Order-Specific) Intravenous Titrated Norma Soto PA-C Last Rate: 9 Units/kg/hr (12/08/19 0110)   heparin (porcine) 4,200 Units Intravenous PRN Lubna Cadena PA-C    heparin (porcine) 8,400 Units Intravenous PRN Matias Díaz PA-C    Labetalol HCl 10 mg Intravenous Q6H PRN Hayley Davis PA-C    levETIRAcetam 750 mg Oral Q12H [address] Antonio Montoya DO    predniSONE 40 mg Oral Daily Moira Cadena PA-C    simethicone 80 mg Oral Q6H PRN Samantha Cuellar DO    warfarin 5 mg Oral Once (warfarin) Linda Lima DO         Today, Patient Was Seen By: Linda Lima DO    ** Please Note: Dictation voice to text software may have been used in the creation of this document   **

## 2019-12-09 ENCOUNTER — DOCUMENTATION (OUTPATIENT)
Dept: LABOR AND DELIVERY | Facility: HOSPITAL | Age: 24
End: 2019-12-09

## 2019-12-09 LAB
ANION GAP SERPL CALCULATED.3IONS-SCNC: 4 MMOL/L (ref 4–13)
APTT PPP: 35 SECONDS (ref 23–37)
APTT PPP: 57 SECONDS (ref 23–37)
APTT PPP: 75 SECONDS (ref 23–37)
APTT PPP: 84 SECONDS (ref 23–37)
B2 GLYCOPROT1 IGA SER-ACNC: <9 GPI IGA UNITS (ref 0–25)
B2 GLYCOPROT1 IGG SER-ACNC: 11 GPI IGG UNITS (ref 0–20)
B2 GLYCOPROT1 IGM SER-ACNC: <9 GPI IGM UNITS (ref 0–32)
BASOPHILS # BLD AUTO: 0.03 THOUSANDS/ΜL (ref 0–0.1)
BASOPHILS NFR BLD AUTO: 1 % (ref 0–1)
BUN SERPL-MCNC: 14 MG/DL (ref 5–25)
CALCIUM SERPL-MCNC: 8.9 MG/DL (ref 8.3–10.1)
CARDIOLIPIN IGA SER IA-ACNC: <9 APL U/ML (ref 0–11)
CARDIOLIPIN IGG SER IA-ACNC: 13 GPL U/ML (ref 0–14)
CARDIOLIPIN IGM SER IA-ACNC: 25 MPL U/ML (ref 0–12)
CHLORIDE SERPL-SCNC: 108 MMOL/L (ref 100–108)
CO2 SERPL-SCNC: 27 MMOL/L (ref 21–32)
CREAT SERPL-MCNC: 0.67 MG/DL (ref 0.6–1.3)
CRP SERPL QL: 18 MG/L
DSDNA AB SER-ACNC: 10 IU/ML (ref 0–9)
EOSINOPHIL # BLD AUTO: 0.1 THOUSAND/ΜL (ref 0–0.61)
EOSINOPHIL NFR BLD AUTO: 2 % (ref 0–6)
ERYTHROCYTE [DISTWIDTH] IN BLOOD BY AUTOMATED COUNT: 13.4 % (ref 11.6–15.1)
GFR SERPL CREATININE-BSD FRML MDRD: 123 ML/MIN/1.73SQ M
GLUCOSE SERPL-MCNC: 84 MG/DL (ref 65–140)
HCT VFR BLD AUTO: 34.9 % (ref 34.8–46.1)
HGB BLD-MCNC: 10.9 G/DL (ref 11.5–15.4)
IMM GRANULOCYTES # BLD AUTO: 0.06 THOUSAND/UL (ref 0–0.2)
IMM GRANULOCYTES NFR BLD AUTO: 1 % (ref 0–2)
INR PPP: 0.97 (ref 0.84–1.19)
LYMPHOCYTES # BLD AUTO: 2.21 THOUSANDS/ΜL (ref 0.6–4.47)
LYMPHOCYTES NFR BLD AUTO: 40 % (ref 14–44)
MCH RBC QN AUTO: 27.6 PG (ref 26.8–34.3)
MCHC RBC AUTO-ENTMCNC: 31.2 G/DL (ref 31.4–37.4)
MCV RBC AUTO: 88 FL (ref 82–98)
MONOCYTES # BLD AUTO: 0.44 THOUSAND/ΜL (ref 0.17–1.22)
MONOCYTES NFR BLD AUTO: 8 % (ref 4–12)
MYOGLOBIN UR-MCNC: 2 NG/ML (ref 0–13)
NEUTROPHILS # BLD AUTO: 2.71 THOUSANDS/ΜL (ref 1.85–7.62)
NEUTS SEG NFR BLD AUTO: 48 % (ref 43–75)
NRBC BLD AUTO-RTO: 0 /100 WBCS
PLATELET # BLD AUTO: 311 THOUSANDS/UL (ref 149–390)
PMV BLD AUTO: 9.8 FL (ref 8.9–12.7)
POTASSIUM SERPL-SCNC: 3.9 MMOL/L (ref 3.5–5.3)
PROT C AG ACT/NOR PPP IA: 106 % OF NORMAL (ref 60–150)
PROT S ACT/NOR PPP: 52 % (ref 63–140)
PROT S ACT/NOR PPP: 73 % (ref 57–157)
PROT S PPP-ACNC: 88 % (ref 60–150)
PROTHROMBIN TIME: 12.5 SECONDS (ref 11.6–14.5)
RBC # BLD AUTO: 3.95 MILLION/UL (ref 3.81–5.12)
SODIUM SERPL-SCNC: 139 MMOL/L (ref 136–145)
WBC # BLD AUTO: 5.55 THOUSAND/UL (ref 4.31–10.16)

## 2019-12-09 PROCEDURE — 85730 THROMBOPLASTIN TIME PARTIAL: CPT | Performed by: PHYSICIAN ASSISTANT

## 2019-12-09 PROCEDURE — 86255 FLUORESCENT ANTIBODY SCREEN: CPT | Performed by: PSYCHIATRY & NEUROLOGY

## 2019-12-09 PROCEDURE — 85025 COMPLETE CBC W/AUTO DIFF WBC: CPT | Performed by: FAMILY MEDICINE

## 2019-12-09 PROCEDURE — 99232 SBSQ HOSP IP/OBS MODERATE 35: CPT | Performed by: INTERNAL MEDICINE

## 2019-12-09 PROCEDURE — 85730 THROMBOPLASTIN TIME PARTIAL: CPT | Performed by: INTERNAL MEDICINE

## 2019-12-09 PROCEDURE — 97530 THERAPEUTIC ACTIVITIES: CPT

## 2019-12-09 PROCEDURE — 86140 C-REACTIVE PROTEIN: CPT | Performed by: PSYCHIATRY & NEUROLOGY

## 2019-12-09 PROCEDURE — 85610 PROTHROMBIN TIME: CPT | Performed by: INTERNAL MEDICINE

## 2019-12-09 PROCEDURE — NS001 PR NO SIGNATURE OR ATTESTATION: Performed by: OBSTETRICS & GYNECOLOGY

## 2019-12-09 PROCEDURE — 80048 BASIC METABOLIC PNL TOTAL CA: CPT | Performed by: FAMILY MEDICINE

## 2019-12-09 PROCEDURE — 99232 SBSQ HOSP IP/OBS MODERATE 35: CPT | Performed by: PSYCHIATRY & NEUROLOGY

## 2019-12-09 RX ORDER — WARFARIN SODIUM 5 MG/1
5 TABLET ORAL
Status: DISCONTINUED | OUTPATIENT
Start: 2019-12-09 | End: 2019-12-10

## 2019-12-09 RX ORDER — SODIUM CHLORIDE 9 MG/ML
25 INJECTION, SOLUTION INTRAVENOUS CONTINUOUS
Status: DISCONTINUED | OUTPATIENT
Start: 2019-12-09 | End: 2019-12-11 | Stop reason: HOSPADM

## 2019-12-09 RX ADMIN — ACETAMINOPHEN 650 MG: 325 TABLET ORAL at 15:40

## 2019-12-09 RX ADMIN — SODIUM CHLORIDE 25 ML/HR: 0.9 INJECTION, SOLUTION INTRAVENOUS at 09:00

## 2019-12-09 RX ADMIN — ACETAMINOPHEN 650 MG: 325 TABLET ORAL at 04:58

## 2019-12-09 RX ADMIN — HEPARIN SODIUM 8400 UNITS: 1000 INJECTION INTRAVENOUS; SUBCUTANEOUS at 17:04

## 2019-12-09 RX ADMIN — LEVETIRACETAM 750 MG: 750 TABLET ORAL at 21:09

## 2019-12-09 RX ADMIN — WARFARIN SODIUM 5 MG: 5 TABLET ORAL at 17:04

## 2019-12-09 RX ADMIN — ACETAMINOPHEN 650 MG: 325 TABLET ORAL at 00:34

## 2019-12-09 RX ADMIN — ACETAMINOPHEN 650 MG: 325 TABLET ORAL at 08:46

## 2019-12-09 RX ADMIN — LEVETIRACETAM 750 MG: 750 TABLET ORAL at 08:47

## 2019-12-09 RX ADMIN — PREDNISONE 40 MG: 20 TABLET ORAL at 08:48

## 2019-12-09 RX ADMIN — HEPARIN SODIUM AND DEXTROSE 11 UNITS/KG/HR: 10000; 5 INJECTION INTRAVENOUS at 19:10

## 2019-12-09 NOTE — ASSESSMENT & PLAN NOTE
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 and surrounding R parietal edema complicated by seizure  Unclear etiology of CVT - potentially post-partum hypercoagulability vs  underlying hypercoagulable/autoimmune disease    Neurology consulted on admission and following   -Patient's symptoms (LUE weakness numbness) have completely resolved  -MRI and CTA reviewed - superior sagittal thrombosis with adjacent cortical vein thrombosis and edema, no clear infarct/hemorrhage  -HCT- no hemorrhage  -Per Neuro's recs: continue heparin gtt, lower PTT goal of 50-70 given risk of hemorrhage  -Will continue to bridge to coumadin- once INR is therapeutic, neuro recommends repeat Head CT prior to discharge  -Continue close neurochecks with stat CT with any changes  -continue Keppra 750mg BID  -no indication for EEG at this time  -thrombosis panel - ATIII normal, anticardiolipin IgG indeterminate and IgM low positive; rest pending  - MORENA, ANCA pending  - Per neuro: will need at least 3-6 months of AC with plan to repeat MRV in about 3 months; may need lifelong if evidence of underlying hypercoagulable state emerges  -will need follow-up with stroke clinic as outpatient  Follow on PT INR

## 2019-12-09 NOTE — ASSESSMENT & PLAN NOTE
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 and surrounding R parietal edema complicated by seizure  Unclear etiology of CVT - potentially post-partum hypercoagulability vs  underlying hypercoagulable/autoimmune disease  Neurology consulted on admission and following   -Patient's symptoms (LUE weakness numbness) have completely resolved  -MRI and CTA reviewed - superior sagittal thrombosis with adjacent cortical vein thrombosis and edema, no clear infarct/hemorrhage  -HCT- no hemorrhage  Continue heparin drip with bridge to Coumadin  Will increase Coumadin 10 mg tonight  Can consider switching to Lovenox and discharging patient home with Lovenox and Coumadin    Will check with Neurology and will need price check for Lovenox   -continue Keppra 750mg BID  -thrombosis panel - ATIII normal, anticardiolipin IgG indeterminate and IgM low positive; rest pending  - MORENA, ANCA pending  - Per neuro: will need at least 3-6 months of AC with plan to repeat MRV in about 3 months; may need lifelong if evidence of underlying hypercoagulable state emerges  -will need follow-up with stroke clinic as outpatient  Follow on PT INR

## 2019-12-09 NOTE — PROGRESS NOTES
Progress Note - Neurology   Rossana Flank 21 y o  female MRN: [id number]  Unit/Bed#: [facility] 713-01 Encounter: [id number]    Assessment:  Patient is a 21year old female presenting post  with seizure activity and found to have Cerebral venous thrombosis of sup sagittal sinus with adjacent cortical vein thrombosis and surrounding right parietal edema  Unclear etiology at this time, Thrombosis/ Autoimmune panel pending    Plan:  -Patient on Coumadin, Plan for CT head once INR is in therapeutic range   -Plan for 6 months of anticoagulation  -Repeat Thrombosis panel in 3 months  -Continue Keppra 750 BID for seizure control   -Follow up with Stroke clinic as outpatient recommended  -MRI and CTA reviewed - superior sagittal thrombosis with adjacent cortical vein thrombosis and edema, no clear infarct/hemorrhage  -HCT reviewed - hypodensity in R parietal region, likely due to edema rather than infarct given exam; no hemorrhage  -Thrombosis panel pending  -Continue Neuro checks      Subjective:   Patient reports numbness in her right leg, different from previous left sided numbness, patient offers no other complaints  Patient is tolerating Keppra and Coumadin well     ROS:  Right lower extremity numbness, pertinent negatives include headache, dizziness, seizure, CP, SOB, palpitations     Vitals: Blood pressure 137/92, pulse 85, temperature 99 5 °F (37 5 °C), resp  rate 18, height 5' 5" (1 651 m), weight 106 kg (233 lb 0 4 oz), last menstrual period 2019, SpO2 98 %, currently breastfeeding  ,Body mass index is 38 78 kg/m²  Physical Exam: Neurologic Exam     Mental Status   Oriented to person, place, and time  Follows 2 step commands  Attention: normal  Concentration: normal    Speech: speech is normal   Level of consciousness: alert  Knowledge: good  Cranial Nerves   Cranial nerves II through XII intact       Motor Exam   Muscle bulk: normal  Overall muscle tone: normal  Right arm tone: normal  Left arm tone: normal  Right arm pronator drift: absent  Left arm pronator drift: absent  Right leg tone: normal  Left leg tone: normal    Strength   Strength 5/5 throughout  Right deltoid: 5/5  Left deltoid: 5/5  Right biceps: 5/5  Left biceps: 5/5  Right interossei: 5/5  Left interossei: 5/5  Right quadriceps: 5/5  Left quadriceps: 5/5  Right hamstrin/5  Left hamstrin/5  Right posterior tibial: 5/5  Left posterior tibial: 5/5  Right peroneal: 5/5  Left peroneal: 5/5    Sensory Exam   Light touch normal    Right leg proprioception: decreased from ankle  Left leg proprioception: decreased from ankle  Pinprick normal      Gait, Coordination, and Reflexes     Reflexes   Right biceps: 2+  Left biceps: 2+  Right patellar: 2+  Left patellar: 2+  Right achilles: 2+  Left achilles: 2+  Right plantar: normal  Left plantar: normalGait deferred        Lab, Imaging and other studies:   I have personally reviewed pertinent reports    , CBC:   Results from last 7 days   Lab Units 19  0619  0616 19  0228   WBC Thousand/uL 5 55 5 42 6 37   RBC Million/uL 3 95 3 68* 3 65*   HEMOGLOBIN g/dL 10 9* 10 4* 10 4*   HEMATOCRIT % 34 9 32 9* 32 5*   MCV fL 88 89 89   PLATELETS Thousands/uL 311 276 318   , BMP/CMP:   Results from last 7 days   Lab Units 19  0619  0616 19  0228 19  1018 19  0446   SODIUM mmol/L 139 141 139 139 137   POTASSIUM mmol/L 3 9 3 9 4 1 3 9 3 2*   CHLORIDE mmol/L 108 110* 108 109* 107   CO2 mmol/L 27 26 25 23 25   BUN mg/dL 14 13 15 8 8   CREATININE mg/dL 0 67 0 60 0 66 0 53* 0 54*   CALCIUM mg/dL 8 9 8 7 8 1* 7 3* 7 2*   AST U/L  --   --  9 14 14   ALT U/L  --   --  19 20 21   ALK PHOS U/L  --   --  115 126* 118*   EGFR ml/min/1 73sq m 123 127 123 132 132   , Vitamin B12:     VTE Prophylaxis: Warfarin (Coumadin)    Counseling / Coordination of Care  N/A

## 2019-12-09 NOTE — PROGRESS NOTES
Progress Note - OB/GYN   Wilfred Lambert 21 y o  female MRN: [id number]  Unit/Bed#: -01 Encounter: [id number]    Assessment:  Ehsan Cagle is a 49VV [id number] QAT#76 from uncomplicated, scheduled repeat low transverse  section admitted s/p seizure on POD#6 subsequently found to have superior sagittal sinus thrombosis    Plan:  1  Superior sagittal sinus thrombosis:   - s/p CT/CTA/MRI/MRV confirmation  - Neurology following   - Continue heparin drip with transition to Warfarin starting 12  - Thrombosis panel and workup per neurology  - Continue management per SLIM and Neurology    2  Grand[place] Seizure  - Most likely due to superior sagittal sinus thrombosis  - No further seizure activity reported  - Continue Keppra 750mg BID    3  Post partum / Postoperative managment  - Continue routine post partum care  - Encourage ambulation  - Encourage breastfeeding/ pumping  - Tylenol PRN    4   alloimmune thrombocytopenia  - s/p IVIG  - s/p 48 hours of solumedrol postpartum  - Continue Prednisone taper    5  FEN: Regular diet    6  VTE PPx: Heparin GTT with transition to Warfarin for therapeutic anticoagulation  Subjective/Objective     Subjective:   She is doing well  She reports that she is happy to be out of the ICU  She continues to report a headache only with sitting or standing  She is breastfeeding, tolerating PO, ambulating, voiding and has appropriate bowel function  Her bleeding is minimal  She denies chest pain, shortness of breath, or leg pain      Objective:     Vitals: /72 (BP Location: Left arm)   Pulse 64   Temp 98 1 °F (36 7 °C) (Oral)   Resp 18   Ht 5' 5" (1 651 m)   Wt 106 kg (233 lb 0 4 oz)   LMP 2019 (Exact Date)   SpO2 98%   BMI 38 78 kg/m²     No intake or output data in the 24 hours ending 19 0622    Lab Results   Component Value Date    WBC 5 42 2019    HGB 10 4 (L) 2019    HCT 32 9 (L) 2019    MCV 89 2019     12/08/2019       Physical Exam:   Physical Exam   Constitutional: She is oriented to person, place, and time  She appears well-developed and well-nourished  Cardiovascular: Normal rate, regular rhythm and normal heart sounds  Exam reveals no gallop and no friction rub  No murmur heard  Pulmonary/Chest: Effort normal and breath sounds normal  No stridor  No respiratory distress  She has no wheezes  Abdominal: Soft  She exhibits no distension  There is no tenderness  There is no guarding  Incision clean, dry, and intact without evidence of infection, erythema, or bleeding   Neurological: She is alert and oriented to person, place, and time  She has normal strength  No sensory deficit  Skin: Skin is warm and dry  Psychiatric: She has a normal mood and affect  Her behavior is normal    Vitals reviewed        Alexander Edgar MD  12/9/2019  6:22 AM

## 2019-12-09 NOTE — PLAN OF CARE
Problem: PAIN - ADULT  Goal: Verbalizes/displays adequate comfort level or baseline comfort level  Description  Interventions:  - Encourage patient to monitor pain and request assistance  - Assess pain using appropriate pain scale  - Administer analgesics based on type and severity of pain and evaluate response  - Implement non-pharmacological measures as appropriate and evaluate response  - Consider cultural and social influences on pain and pain management  - Notify physician/advanced practitioner if interventions unsuccessful or patient reports new pain  Outcome: Progressing     Problem: INFECTION - ADULT  Goal: Absence or prevention of progression during hospitalization  Description  INTERVENTIONS:  - Assess and monitor for signs and symptoms of infection  - Monitor lab/diagnostic results  - Monitor all insertion sites, i e  indwelling lines, tubes, and drains  - Monitor endotracheal if appropriate and nasal secretions for changes in amount and color  - [place] appropriate cooling/warming therapies per order  - Administer medications as ordered  - Instruct and encourage patient and family to use good hand hygiene technique  - Identify and instruct in appropriate isolation precautions for identified infection/condition  Outcome: Progressing  Goal: Absence of fever/infection during neutropenic period  Description  INTERVENTIONS:  - Monitor WBC    Outcome: Progressing     Problem: SAFETY ADULT  Goal: Patient will remain free of falls  Description  INTERVENTIONS:  - Assess patient frequently for physical needs  -  Identify cognitive and physical deficits and behaviors that affect risk of falls    -  [place] fall precautions as indicated by assessment   - Educate patient/family on patient safety including physical limitations  - Instruct patient to call for assistance with activity based on assessment  - Modify environment to reduce risk of injury  - Consider OT/PT consult to assist with strengthening/mobility  Outcome: Progressing  Goal: Maintain or return to baseline ADL function  Description  INTERVENTIONS:  -  Assess patient's ability to carry out ADLs; assess patient's baseline for ADL function and identify physical deficits which impact ability to perform ADLs (bathing, care of mouth/teeth, toileting, grooming, dressing, etc )  - Assess/evaluate cause of self-care deficits   - Assess range of motion  - Assess patient's mobility; develop plan if impaired  - Assess patient's need for assistive devices and provide as appropriate  - Encourage maximum independence but intervene and supervise when necessary  - Involve family in performance of ADLs  - Assess for home care needs following discharge   - Consider OT consult to assist with ADL evaluation and planning for discharge  - Provide patient education as appropriate  Outcome: Progressing  Goal: Maintain or return mobility status to optimal level  Description  INTERVENTIONS:  - Assess patient's baseline mobility status (ambulation, transfers, stairs, etc )    - Identify cognitive and physical deficits and behaviors that affect mobility  - Identify mobility aids required to assist with transfers and/or ambulation (gait belt, sit-to-stand, lift, walker, cane, etc )  - [place] fall precautions as indicated by assessment  - Record patient progress and toleration of activity level on Mobility SBAR; progress patient to next Phase/Stage  - Instruct patient to call for assistance with activity based on assessment  - Consider rehabilitation consult to assist with strengthening/weightbearing, etc   Outcome: Progressing     Problem: DISCHARGE PLANNING  Goal: Discharge to home or other facility with appropriate resources  Description  INTERVENTIONS:  - Identify barriers to discharge w/patient and caregiver  - Arrange for needed discharge resources and transportation as appropriate  - Identify discharge learning needs (meds, wound care, etc )  - Arrange for interpretive services to assist at discharge as needed  - Refer to Case Management Department for coordinating discharge planning if the patient needs post-hospital services based on physician/advanced practitioner order or complex needs related to functional status, cognitive ability, or social support system  Outcome: Progressing     Problem: Knowledge Deficit  Goal: Patient/family/caregiver demonstrates understanding of disease process, treatment plan, medications, and discharge instructions  Description  Complete learning assessment and assess knowledge base  Interventions:  - Provide teaching at level of understanding  - Provide teaching via preferred learning methods  Outcome: Progressing     Problem: Nutrition/Hydration-ADULT  Goal: Nutrient/Hydration intake appropriate for improving, restoring or maintaining nutritional needs  Description  Monitor and assess patient's nutrition/hydration status for malnutrition  Collaborate with interdisciplinary team and initiate plan and interventions as ordered  Monitor patient's weight and dietary intake as ordered or per policy  Utilize nutrition screening tool and intervene as necessary  Determine patient's food preferences and provide high-protein, high-caloric foods as appropriate       INTERVENTIONS:  - Monitor oral intake, urinary output, labs, and treatment plans  - Assess nutrition and hydration status and recommend course of action  - Evaluate amount of meals eaten  - Assist patient with eating if necessary   - Allow adequate time for meals  - Recommend/ encourage appropriate diets, oral nutritional supplements, and vitamin/mineral supplements  - Order, calculate, and assess calorie counts as needed  - Recommend, monitor, and adjust tube feedings and TPN/PPN based on assessed needs  - Assess need for intravenous fluids  - Provide specific nutrition/hydration education as appropriate  - Include patient/family/caregiver in decisions related to nutrition  Outcome: Progressing     Problem: GENITOURINARY - ADULT  Goal: Maintains or returns to baseline urinary function  Description  INTERVENTIONS:  - Assess urinary function  - Encourage oral fluids to ensure adequate hydration if ordered  - Administer IV fluids as ordered to ensure adequate hydration  - Administer ordered medications as needed  - Offer frequent toileting  - Follow urinary retention protocol if ordered  Outcome: Progressing     Problem: METABOLIC, FLUID AND ELECTROLYTES - ADULT  Goal: Electrolytes maintained within normal limits  Description  INTERVENTIONS:  - Monitor labs and assess patient for signs and symptoms of electrolyte imbalances  - Administer electrolyte replacement as ordered  - Monitor response to electrolyte replacements, including repeat lab results as appropriate  - Instruct patient on fluid and nutrition as appropriate  Outcome: Progressing     Problem: Potential for Falls  Goal: Patient will remain free of falls  Description  INTERVENTIONS:  - Assess patient frequently for physical needs  -  Identify cognitive and physical deficits and behaviors that affect risk of falls    -  [place] fall precautions as indicated by assessment   - Educate patient/family on patient safety including physical limitations  - Instruct patient to call for assistance with activity based on assessment  - Modify environment to reduce risk of injury  - Consider OT/PT consult to assist with strengthening/mobility  Outcome: Progressing

## 2019-12-09 NOTE — PROGRESS NOTES
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 and surrounding R parietal edema complicated by seizure  Unclear etiology of CVT - potentially post-partum hypercoagulability vs  underlying hypercoagulable/autoimmune disease  Neurology consulted on admission and following   -Patient's symptoms (LUE weakness numbness) have completely resolved  -MRI and CTA reviewed - superior sagittal thrombosis with adjacent cortical vein thrombosis and edema, no clear infarct/hemorrhage  -HCT- no hemorrhage  -Per Neuro's recs: continue heparin gtt,   -Will continue to bridge to coumadin- once INR is therapeutic, neuro recommends repeat Head CT prior to discharge  -Continue close neurochecks with stat CT with any changes  -continue Keppra 750mg BID  -no indication for EEG at this time  -thrombosis panel - ATIII normal, anticardiolipin IgG indeterminate and IgM low positive; rest pending  - MORENA, ANCA pending  - Per neuro: will need at least 3-6 months of AC with plan to repeat MRV in about 3 months; may need lifelong if evidence of underlying hypercoagulable state emerges  -will need follow-up with stroke clinic as outpatient  Follow on PT INR    Seizure [facility])  Assessment & Plan  No repeat seizure activity since admission  Seizure most likely subsequent to CVT of superior sagittal sinus  Neuro consulted on admission and following  Continue Keppra 750mg BID  Status post  delivery  Assessment & Plan  Postop RLTCS on 19  Ob/Gyn consulted on admission and following  Pt is meeting all postop goals    Per Ob/Gyn: continue routine postpartum/postoperative care  Tylenol PRN for pain      Alloimmune thrombocytopenia during pregnancy, antepartum  Assessment & Plan  Status post IVIG, Solumedrol  Continue Prednisone taper by 10 mg  Starting 12/10      Discussed with Hematology and Neurology  Okay to continue with heparin drip with a PTT range 60- 90  No boluses       VTE Pharmacologic Prophylaxis:   Pharmacologic: Heparin Drip  Mechanical VTE Prophylaxis in Place: Yes    Patient Centered Rounds: I have performed bedside rounds with nursing staff today  Discussions with Specialists or Other Care Team Provider: GYN    Education and Discussions with Family / Patient:  Patient    Time Spent for Care: 30 minutes  More than 50% of total time spent on counseling and coordination of care as described above  Current Length of Stay: 4 day(s)    Current Patient Status: Inpatient   Certification Statement: The patient will continue to require additional inpatient hospital stay due to Above    Discharge Plan / Estimated Discharge Date:  Awaiting therapeutic INR    Code Status: Level 1 - Full Code      Subjective:   Patient seen and examined  Comfortable in bed  Denied headache  No chest pain or shortness of breath  No nausea vomiting or diarrhea    Objective:     Vitals:   Temp (24hrs), Av 3 °F (36 8 °C), Min:98 1 °F (36 7 °C), Max:98 6 °F (37 °C)    Temp:  [98 1 °F (36 7 °C)-98 6 °F (37 °C)] 98 4 °F (36 9 °C)  HR:  [53-72] 53  Resp:  [18] 18  BP: (113-146)/(71-89) 130/71  SpO2:  [97 %-99 %] 97 %  Body mass index is 38 78 kg/m²       Input and Output Summary (last 24 hours):     No intake or output data in the 24 hours ending 19 0811    Physical Exam:     Physical Exam  Patient is awake alert oriented no acute distress  Lung clear to auscultation bilateral anteriorly  Heart positive S1-S2 no murmur  Abdomen soft  Lower extremities no edema    Additional Data:     Labs:    Results from last 7 days   Lab Units 19  0627   WBC Thousand/uL 5 55   HEMOGLOBIN g/dL 10 9*   HEMATOCRIT % 34 9   PLATELETS Thousands/uL 311   NEUTROS PCT % 48   LYMPHS PCT % 40   MONOS PCT % 8   EOS PCT % 2     Results from last 7 days   Lab Units 12/09/19  0627  12/07/19  0228   POTASSIUM mmol/L 3 9   < > 4 1   CHLORIDE mmol/L 108   < > 108   CO2 mmol/L 27   < > 25   BUN mg/dL 14   < > 15   CREATININE mg/dL 0 67   < > 0 66   CALCIUM mg/dL 8 9   < > 8 1*   ALK PHOS U/L  --   --  115   ALT U/L  --   --  19   AST U/L  --   --  9    < > = values in this interval not displayed  Results from last 7 days   Lab Units 12/08/19  0616   INR  0 98       * I Have Reviewed All Lab Data Listed Above  * Additional Pertinent Lab Tests Reviewed: [address] Admission Reviewed    Imaging:    Imaging Reports Reviewed Today Include:   Imaging Personally Reviewed by Myself Includes:     Recent Cultures (last 7 days):           Last 24 Hours Medication List:     Current Facility-Administered Medications:  acetaminophen 650 mg Oral Q4H PRN JORGE LUIS Molina-ANA    baclofen 10 mg Oral Q8H PRN Hayley E Held, PA-ANA    heparin (porcine) 3-30 Units/kg/hr (Order-Specific) Intravenous Titrated Allie Graham PA-C Last Rate: 9 Units/kg/hr (12/09/19 0125)   heparin (porcine) 4,200 Units Intravenous PRN Yazmin Cadena PA-C    heparin (porcine) 8,400 Units Intravenous PRN JORGE LUIS Molina-ANA    Labetalol HCl 10 mg Intravenous Q6H PRN Hayley E Held PA-C    levETIRAcetam 750 mg Oral Q12H [facility] Antonio Montoya DO    predniSONE 40 mg Oral Daily Moira Cadena PA-C    simethicone 80 mg Oral Q6H PRN Samantha Cuellar DO    warfarin 5 mg Oral Daily (warfarin) Tana Moe DO         Today, Patient Was Seen By: Tana Moe DO    ** Please Note: This note has been constructed using a voice recognition system   **

## 2019-12-09 NOTE — NURSING NOTE
Pt transferred via [facility] to room 713 with all belongings   Report given to Renato Santana RN

## 2019-12-09 NOTE — PHYSICAL THERAPY NOTE
Physical Therapy Progress Note  Patient Name: Ana POE Date: 2019     Problem List  Principal Problem: Thrombosis of superior sagittal sinus  Active Problems:    Alloimmune thrombocytopenia during pregnancy, antepartum    Status post  delivery    Seizure [facility])       Past Medical History  Past Medical History:   Diagnosis Date     alloimmune thrombocytopenia     Obesity complicating pregnancy in second trimester 2019    Varicella     childhood        Past Surgical History  Past Surgical History:   Procedure Laterality Date     SECTION      CHOLECYSTECTOMY          CHOLECYSTECTOMY LAPAROSCOPIC N/A 2017    Procedure: CHOLECYSTECTOMY LAPAROSCOPIC;  Surgeon: Harris Alvares MD;  [facility];  Service: General    RI  DELIVERY ONLY N/A 2019    Procedure:  SECTION () REPEAT;  Surgeon: Amy Lopez MD;  Location: [facility];  Service: Obstetrics        19 1410   Pain Assessment   Pain Assessment 0-10   Pain Score 3   Pain Type Acute pain   Pain Location Head   Restrictions/Precautions   Weight Bearing Precautions Per Order No   Other Precautions Fall Risk   General   Chart Reviewed Yes   Family/Caregiver Present No   Cognition   Overall Cognitive Status WFL   Arousal/Participation Alert; Responsive   Attention Within functional limits   Orientation Level Oriented X4   Memory Within functional limits   Following Commands Follows all commands and directions without difficulty   Comments Pt is pleasant and cooperative t/o sessions   Subjective   Subjective Pt is agreeable to PT session   Bed Mobility   Supine to Sit 7  Independent   Sit to Supine 7  Independent   Transfers   Sit to Stand 7  Independent   Stand to Sit 7  Independent   Ambulation/Elevation   Gait pattern Excessively slow   Gait Assistance 6  Modified independent   Assistive Device   (IV pole)   Distance 200 feet   Stair Management Assistance 5  Supervision   Additional items Verbal cues   Stair Management Technique One rail R;Step to pattern   Number of Stairs 8  (1 step x 8, limited by lines)   Balance   Static Sitting Fair +   Dynamic Sitting Fair +   Static Standing Fair   Dynamic Standing Fair   Ambulatory Fair   Endurance Deficit   Endurance Deficit No   Activity Tolerance   Activity Tolerance Patient tolerated treatment well   Assessment   Prognosis Good   Problem List Decreased endurance; Impaired balance   Assessment Patient seen for physical therapy session with a focus on activity tolerance, stair management, functional mobility  In comparison to previous session, patient demonstrated improved functional mobility and activity tolerance  Pt required no physical assistance throughout session  Pt is now at independent level for bed mobility and transfers, mod I for ambulation, and supervision for stair management  Pt ambulated 200 feet at mod I level with IV pole  Pt ascend/descend 8 steps (1 step x 8, limited by lines) at supervision level using step to pattern and L hand rail  Pt is functioning close to prior baseline and no longer requires inpatient PT needs  Please re-consult if medically appropriate  Educated on home safety, energy conservation, and inpatient PT discharge needs  Encouraged ambulation 3-4/x day with NSG and restorative tech  Discharge recommendation is home with family support  D/C PT  Goals   Patient Goals to go home and see her baby   STG Expiration Date 12/20/19   PT Treatment Day 1   Plan   Treatment/Interventions Functional transfer training;LE strengthening/ROM; Therapeutic exercise; Endurance training;Patient/family training;Equipment eval/education; Bed mobility;Gait training   Progress Improving as expected   Recommendation   Recommendation Home with family support   Equipment Recommended   (none)   PT - OK to Discharge Yes       Von Adjutant PT, DPT

## 2019-12-09 NOTE — PROGRESS NOTES
Spoke to Dr Lazarus Calamity and he said to follow the heparin gtt protocol that is ordered that has PTT therapeutic goal of 60-90  Hematology consult is ordered for tomorrow  RN will follow the orders to give her 8,400 units of heparin bolus and increase the gtt by 4u/kg/hr since her PTT was 35   Will re-check PTT at 11pm

## 2019-12-10 PROBLEM — R31.9 HEMATURIA: Status: ACTIVE | Noted: 2019-12-10

## 2019-12-10 LAB
ANA HOMOGEN SER QL IF: NORMAL
ANA HOMOGEN TITR SER: NORMAL {TITER}
APTT PPP: 147 SECONDS (ref 23–37)
APTT PPP: 38 SECONDS (ref 23–37)
APTT PPP: 61 SECONDS (ref 23–37)
APTT PPP: 83 SECONDS (ref 23–37)
APTT SCREEN TO CONFIRM RATIO: 1.17 RATIO (ref 0–1.4)
CONFIRM APTT/NORMAL: 44.7 SEC (ref 0–55)
DRVVT IMM 1:2 NP PPP: 42.2 SEC (ref 0–47)
F2 GENE MUT ANL BLD/T: ABNORMAL
INR PPP: 1.02 (ref 0.84–1.19)
LA PPP-IMP: ABNORMAL
PROTHROMBIN TIME: 13 SECONDS (ref 11.6–14.5)
RYE IGE QN: POSITIVE
SCREEN APTT: 34.8 SEC (ref 0–51.9)
SCREEN DRVVT: 52.4 SEC (ref 0–47)
THROMBIN TIME: 19.1 SEC (ref 0–23)

## 2019-12-10 PROCEDURE — NS001 PR NO SIGNATURE OR ATTESTATION: Performed by: OBSTETRICS & GYNECOLOGY

## 2019-12-10 PROCEDURE — 85610 PROTHROMBIN TIME: CPT | Performed by: INTERNAL MEDICINE

## 2019-12-10 PROCEDURE — 85730 THROMBOPLASTIN TIME PARTIAL: CPT | Performed by: INTERNAL MEDICINE

## 2019-12-10 PROCEDURE — 99223 1ST HOSP IP/OBS HIGH 75: CPT | Performed by: INTERNAL MEDICINE

## 2019-12-10 PROCEDURE — 99232 SBSQ HOSP IP/OBS MODERATE 35: CPT | Performed by: INTERNAL MEDICINE

## 2019-12-10 RX ORDER — WARFARIN SODIUM 5 MG/1
10 TABLET ORAL
Status: DISCONTINUED | OUTPATIENT
Start: 2019-12-10 | End: 2019-12-11 | Stop reason: HOSPADM

## 2019-12-10 RX ADMIN — ACETAMINOPHEN 650 MG: 325 TABLET ORAL at 23:30

## 2019-12-10 RX ADMIN — HEPARIN SODIUM AND DEXTROSE 12 UNITS/KG/HR: 10000; 5 INJECTION INTRAVENOUS at 19:33

## 2019-12-10 RX ADMIN — PREDNISONE 40 MG: 20 TABLET ORAL at 08:06

## 2019-12-10 RX ADMIN — LEVETIRACETAM 750 MG: 750 TABLET ORAL at 08:06

## 2019-12-10 RX ADMIN — WARFARIN SODIUM 10 MG: 5 TABLET ORAL at 17:00

## 2019-12-10 RX ADMIN — LEVETIRACETAM 750 MG: 750 TABLET ORAL at 21:16

## 2019-12-10 RX ADMIN — SODIUM CHLORIDE 25 ML/HR: 0.9 INJECTION, SOLUTION INTRAVENOUS at 19:33

## 2019-12-10 NOTE — UTILIZATION REVIEW
Continued Stay Review    Date: 12/10/2019                          Current Patient Class: inpatient  Current Level of Care: m/s    HPI:24 y o  female initially admitted on 2019    Assessment/Plan: ob/gyn--Post op day #00 from uncomplicated low transverse  admitted s/p seizure on pod#6 and found to have a superior sagittal sinus thrombosis  Pertinent Labs/Diagnostic Results: plan is continue heparin drip and transition to warfarin starting   thrombosis workup and panel per neurology  Grandmal  seizure likely due to thrombosis - no further seizure continue on keppra  Continue prednisone taper   Results from last 7 days   Lab Units 19  1018 19  0446  19  1700   WBC Thousand/uL 5 55 5 42 6 37 5 81 6 72   < > 8 44   HEMOGLOBIN g/dL 10 9* 10 4* 10 4* 11 1* 10 1*   < > 11 0*   HEMATOCRIT % 34 9 32 9* 32 5* 35 2 32 5*   < > 34 7*   PLATELETS Thousands/uL 311 276 318 290 240   < > 280   NEUTROS ABS Thousands/µL 2 71 2 55  --   --   --   --  7 26    < > = values in this interval not displayed           Results from last 7 days   Lab Units 19  1018 19  0446 19  0150   SODIUM mmol/L 139 141 139 139 137  --    POTASSIUM mmol/L 3 9 3 9 4 1 3 9 3 2*  --    CHLORIDE mmol/L 108 110* 108 109* 107  --    CO2 mmol/L 27 26 25 23 25  --    ANION GAP mmol/L 4 5 6 7 5  --    BUN mg/dL 14 13 15 8 8  --    CREATININE mg/dL 0 67 0 60 0 66 0 53* 0 54*  --    EGFR ml/min/1 73sq m 123 127 123 132 132  --    CALCIUM mg/dL 8 9 8 7 8 1* 7 3* 7 2*  --    MAGNESIUM mg/dL  --  1 8  --   --  5 0* 4 8*   PHOSPHORUS mg/dL  --  5 0*  --   --  3 7  --      Results from last 7 days   Lab Units 19  1018 19  0446 19  0013 19  2005 19  1700   AST U/L 9 14 14 14  --  45   ALT U/L 19 20 21 21  --  26   ALK PHOS U/L 115 126* 118* 122*  --  129*   TOTAL PROTEIN g/dL 7 4 8 1 7 6 7 8  --  8 6*   ALBUMIN g/dL 2 2* 2 5* 2 0* 2 4*  --  2 1*   TOTAL BILIRUBIN mg/dL <0 10* 0 19* 0 21 0 13*  --  0 38   BETA 2 GLYCO 1 IGA GPI IgA units  --   --   --   --  <9  --    BETA 2 GLYCO 1 IGG GPI IgG units  --   --   --   --  11  --    BETA 2 GLYCO 1 IGM GPI IgM units  --   --   --   --  <9  --          Results from last 7 days   Lab Units 12/09/19  0627 12/08/19  0616 12/07/19  0228 12/06/19  1018 12/06/19  0446 12/06/19  0013 12/05/19  1700   GLUCOSE RANDOM mg/dL 84 89 140 88 100 97 94         Results from last 7 days   Lab Units 12/06/19  0446   HEMOGLOBIN A1C % 5 7   EAG mg/dl 117     Results from last 7 days   Lab Units 12/10/19  0757 12/09/19  2327 12/09/19  1514 12/09/19  0849  12/08/19  0616   PROTIME seconds 13 0  --   --  12 5  --  12 6   INR  1 02  --   --  0 97  --  0 98   PTT seconds 61* 147* 35 57*   < > 59*    < > = values in this interval not displayed           Results from last 7 days   Lab Units 12/09/19  0627   CRP mg/L 18 0*         Results from last 7 days   Lab Units 12/06/19  1427 12/05/19  1720   CLARITY UA  Cloudy Cloudy   COLOR UA  Yellow Yellow   SPEC GRAV UA  1 012 1 019   PH UA  5 5 5 5   GLUCOSE UA mg/dl Negative Negative   KETONES UA mg/dl Negative Negative   BLOOD UA  Large* Large*   PROTEIN UA mg/dl Negative 100 (2+)*   NITRITE UA  Negative Negative   BILIRUBIN UA  Negative Negative   UROBILINOGEN UA E U /dl 0 2 0 2   LEUKOCYTES UA  Trace* Negative   WBC UA /hpf 10-20* 4-10*   RBC UA /hpf None Seen None Seen   BACTERIA UA /hpf Occasional Occasional   EPITHELIAL CELLS WET PREP /hpf Occasional Occasional   CREATININE UR mg/dL  --  79 7   PROTEIN UR mg/dL  --  110   PROT/CREAT RATIO UR   --  1 38*         Vital Signs:   07:20:31  98 6 °F (37 °C)    16  132/80  97           12/09/19 22:14:42  98 8 °F (37 1 °C)      131/81  98           12/09/19 15:07:50  99 5 °F (37 5 °C)    18  137/92  107           12/09/19 1206    85  18  148/98    98 %  None (Room air)   Sitting   12/09/19 1200        143/83          Sitting   12/09/19 0846                WDL     Comment rows:   OBSERV: plan of care reviewed for transfer to med surg floor at 12/09/19 0846   12/09/19 0733  98 4 °F (36 9 °C)  53Abnormal   18  130/71    97 %         12/09/19 0430  98 1 °F (36 7 °C)  64  18  113/72    98 %  None (Room air)    Lying   12/08/19 1920  98 4 °F (36 9 °C)  66                        Medications:   Scheduled Medications:    Medications:  levETIRAcetam 750 mg Oral Q12H [facility]   predniSONE 40 mg Oral Daily   warfarin 10 mg Oral Daily (warfarin)     Continuous IV Infusions:    heparin (porcine) 3-30 Units/kg/hr (Order-Specific) Intravenous Titrated   sodium chloride 25 mL/hr Intravenous Continuous     PRN Meds:    acetaminophen 650 mg Oral Q4H PRN   baclofen 10 mg Oral Q8H PRN   heparin (porcine) 4,200 Units Intravenous PRN   heparin (porcine) 8,400 Units Intravenous PRN   Labetalol HCl 10 mg Intravenous Q6H PRN   simethicone 80 mg Oral Q6H PRN     Plan is to continue anticoag therapy and follow with hematology in op   Discharge Plan:     Network Utilization Review Department  [email or web address]  ATTENTION: Please call with any questions or concerns to [phone] and carefully listen to the prompts so that you are directed to the right person  All voicemails are confidential   Susi Gonsales all requests for admission clinical reviews, approved or denied determinations and any other requests to dedicated fax number below belonging to the campus where the patient is receiving treatment   List of dedicated fax numbers for the Facilities:  FACILITY NAME UR FAX NUMBER   ADMISSION DENIALS (Administrative/Medical Necessity) [phone]   [address] (Maternity/NICU/Pediatrics) [phone]   [facility] [address] [place] [address] [facility] [phone]   [facility] [phone]   [facility] [address] [phone]   [address] [place] [address] [phone]

## 2019-12-10 NOTE — PROGRESS NOTES
RN communicating with neurology, [facility] (Dr Freda Aaron), and pharmacy on what the plan for her heparin gtt is  Neurology wants her PTT therapeutic goal to be 50-70, but they have not ordered anything, and they are unable to because Dr Sam Holm is not on site as of now  Dr Freda Aaron made aware of neurologist's recommendations but he said that he will stick with the parameters that are ordered and will follow up tomorrow  Treatment team was made aware that her PTT at 3pm is 35, so RN is just waiting for orders as of now

## 2019-12-10 NOTE — PROGRESS NOTES
called by RN to made them aware that patient pumped out breastmilk  They said it's ok to keep it in the refrigerator for tonight to be used tomorrow  RN placed it in a clear plastic bag, placed a patient sticker on it, and placed the date and time it was pumped

## 2019-12-10 NOTE — CONSULTS
Patient: Rossana Schmitt  Patient MRN: [id number]  Service date: 12/10/2019  Attending Physician:       CHIEF COMPLAIN  Chief Complaint   Patient presents with    Seizure - New Onset     Pt had a seizure lasting 2 minutes per boyfriend  Pt is 6 days postpartem       Heme / Oncology history:  Rossana Schmitt is a 21 y o  female     1, newly diagnosed Superior sagittal sinus thrombosis, diagnosed on day 5 post , on heparin, bridge to Coumadin  2,  immune mediated thrombocytopenia, received weekly IVIG times 16 and high-dose prednisone for 6 months immediately before delivery  3, active smoker      HISTORY OF PRESENT ILLNESS:  Rossana Schmitt is a 21 y o  female who is  , PUD 11 from a complicated scheduled    On postop day 6 , she developed g of seizure , and then diagnosed superior sagittal sinus thrombosis   Hematology was consulted for comanagement  Patient reported doing well , she will have 1 episode of seizure  No previous CNS issues  No previous thrombosis  No family history of thrombosis   She has been tolerating anticoagulation therapy okay without bleeding   Again patient has no other autoimmune disease, chronic inflammation  She is active smoker  ASSESSMENT/PLAN:  Rossana Schmitt is a 21 y o  female with:    1) newly diagnosed Superior sagittal sinus thrombosis  2) on anticoagulation therapy  3) anemia  4) history of new nasal immune mediated thrombocytopenia received IVIG and prednisone    -1st thrombosis in life, risk factor include postpartum hypercoagulable state , active smoker  Agree with heparin bridged to Coumadin  Minimum 3 months, further management will depending on the thrombosis workup  Patient will have a thrombosis profile as outpatient, also consider  to test AMBAR 2 mut and PNH      - patient currently on heparin and bridged to Coumadin, case was discussed with our pharmacist, made patient aware theoritically there is a small risk that taking Coumadin will increase baby bleeding risk by breast-feeding        - anemia is likely due to blood loss   Has been stable, continue monitor  - ?  Hematuria or hemolysis is likely due to contamination from vagfinal bleeding  Overall, continue anticoagulation therapy, follow hematology in outpatient setting within a month of discharge  minutes were spent face to face with patient with greater than 50% of the time spent in counseling or coordination of care including discu the ssions of treatment instructions  All of the patient's questions were answered to their satisfactory during this discussion  Suri Myers MD PhD  Hematology / Oncology              PROBLEM LIST:  Patient Active Problem List   Diagnosis    At high risk for  complications    Tobacco use    Maternal obesity syndrome in third trimester    Alloimmune thrombocytopenia during pregnancy, antepartum    Supervision of high-risk pregnancy, third trimester    37 weeks gestation of pregnancy    Maternal morbid obesity in third trimester, antepartum ([facility] [address] [facility]    Status post  delivery    Thrombosis of superior sagittal sinus    Seizure ([facility] [address] )                     PAST MEDICAL HISTORY:   has a past medical history of  alloimmune thrombocytopenia, Obesity complicating pregnancy in second trimester (2019), and Varicella  PAST SURGICAL HISTORY:   has a past surgical history that includes  section (); CHOLECYSTECTOMY LAPAROSCOPIC (N/A, 2017); Cholecystectomy; and pr  delivery only (N/A, 2019)      CURRENT MEDICATIONS  Scheduled Meds:  Current Facility-Administered Medications:  acetaminophen 650 mg Oral Q4H PRN Gayathri Arreguin MD    baclofen 10 mg Oral Q8H PRN Gayathri Arreguin MD    heparin (porcine) 3-30 Units/kg/hr (Order-Specific) Intravenous Titrated Laura Herbert DO Last Rate: 8 Units/kg/hr (12/10/19 0130)   heparin (porcine) 4,200 Units Intravenous PRN Gayathri Arreguin MD    heparin (porcine) 8,400 Units Intravenous PRN Brandon Suárez MD    Labetalol HCl 10 mg Intravenous Q6H PRN Brandon Suárez MD    levETIRAcetam 750 mg Oral Q12H [address] Brandon Suárez MD    predniSONE 40 mg Oral Daily Brandon Suárez MD    simethicone 80 mg Oral Q6H PRN Brandon Suárez MD    sodium chloride 25 mL/hr Intravenous Continuous Brandon Suárez MD Last Rate: 25 mL/hr (12/09/19 0900)   warfarin 10 mg Oral Daily (warfarin) Lin Kirkpatrick MD      Continuous Infusions:  heparin (porcine) 3-30 Units/kg/hr (Order-Specific) Last Rate: 8 Units/kg/hr (12/10/19 0130)   sodium chloride 25 mL/hr Last Rate: 25 mL/hr (12/09/19 0900)     PRN Meds:   acetaminophen    baclofen    heparin (porcine)    heparin (porcine)    Labetalol HCl    simethicone    SOCIAL HISTORY:   reports that she quit smoking about 3 weeks ago  Her smoking use included cigarettes  She smoked 0 50 packs per day  She has never used smokeless tobacco  She reports that she drank alcohol  She reports that she does not use drugs  FAMILY HISTORY:  family history includes Arthritis in her mother; Cancer in her maternal grandmother, mother, and paternal grandfather; Diabetes in her maternal uncle and mother; Heart disease in her maternal grandfather; Hypertension in her father; Kidney disease in her mother; No Known Problems in her brother, paternal grandmother, and son; Other in her mother; Stroke in her maternal grandfather  ALLERGIES:  is allergic to bee venom and tobramycin  REVIEW OF SYSTEMS:  Please note that a 14-point review of systems was performed to include Constitutional, HEENT, Respiratory, CVS, GI, , Musculoskeletal, Integumentary, Neurologic, Rheumatologic, Endocrinologic, Psychiatric, Lymphatic, and Hematologic/Oncologic systems were reviewed and are negative unless otherwise stated in HPI  Positive and negative findings pertinent to this evaluation are incorporated into the history of present illness      PHYSICAL EXAMINATION:  Vital Signs: /80   Pulse 85   Temp 98 6 °F (37 °C)   Resp 16   Ht 5' 5" (1 651 m)   Wt 106 kg (233 lb 0 4 oz)   LMP 03/09/2019 (Exact Date)   SpO2 98%   BMI 38 78 kg/m²   Body surface area is 2 11 meters squared  Ht Readings from Last 3 Encounters:   12/05/19 5' 5" (1 651 m)   12/05/19 5' 5" (1 651 m)   12/05/19 5' 5" (1 651 m)     Wt Readings from Last 3 Encounters:   12/05/19 106 kg (233 lb 0 4 oz)   12/05/19 106 kg (233 lb 0 4 oz)   12/05/19 106 kg (234 lb)      Temp Readings from Last 3 Encounters:   12/10/19 98 6 °F (37 °C)   12/01/19 98 2 °F (36 8 °C) (Oral)   11/25/19 97 6 °F (36 4 °C) (Temporal)      BP Readings from Last 3 Encounters:   12/10/19 132/80   12/05/19 134/80   12/01/19 145/81       Pulse Readings from Last 3 Encounters:   12/09/19 85   12/01/19 74   11/26/19 88         Constitutional: Alert and oriented    HEENT: Anicteric, PERRLA  Chest: Decreased breathing sound bilaterally, No wheezes/rales/rhonchi  CVS: Regular rhythm  Normal rate  Abdomen: Soft, nontender, nondistended  No palpable organomegaly  Extremities: No cyanosis/clubbing/edema  Integumentary: No obvious rashes or bruises  Musculoskeletal: No obvious bony or joint deformities  Psychiatric: Appropriate affect and mood  Lymph Node Survey: No palpable preauricular, submandibular, cervical, supraclavicular, axillary, epitrochlear or inguinal lymphadenopathy      LABS:      CBC with diff: Results from last 7 days   Lab Units 12/09/19  0627 12/08/19  0616 12/07/19  0228 12/06/19  1018 12/06/19  0446 12/06/19  0013 12/05/19  1700   WBC Thousand/uL 5 55 5 42 6 37 5 81 6 72 8 69 8 44   HEMOGLOBIN g/dL 10 9* 10 4* 10 4* 11 1* 10 1* 10 4* 11 0*   HEMATOCRIT % 34 9 32 9* 32 5* 35 2 32 5* 32 1* 34 7*   MCV fL 88 89 89 88 91 88 87   PLATELETS Thousands/uL 311 276 318 290 240 266 280   MCH pg 27 6 28 3 28 5 27 8 28 2 28 3 27 7   MCHC g/dL 31 2* 31 6 32 0 31 5 31 1* 32 4 31 7   RDW % 13 4 13 7 13 8 14 0 13 8 14 0 13 8   MPV fL 9 8 9 8 9 9 9 8 9 8 9 8 10 2   NRBC AUTO /100 WBCs 0 0  --   --   --   --  0       CMP:  Results from last 7 days   Lab Units 12/09/19  0627 12/08/19  0616 12/07/19  0228 12/06/19  1018 12/06/19  0446 12/06/19  0013 12/05/19  1700   POTASSIUM mmol/L 3 9 3 9 4 1 3 9 3 2* 3 2* 5 0   CHLORIDE mmol/L 108 110* 108 109* 107 108 108   CO2 mmol/L 27 26 25 23 25 24 22   BUN mg/dL 14 13 15 8 8 10 14   CREATININE mg/dL 0 67 0 60 0 66 0 53* 0 54* 0 56* 0 75   CALCIUM mg/dL 8 9 8 7 8 1* 7 3* 7 2* 7 5* 8 7   AST U/L  --   --  9 14 14 14 45   ALT U/L  --   --  19 20 21 21 26   ALK PHOS U/L  --   --  115 126* 118* 122* 129*   EGFR ml/min/1 73sq m 123 127 123 132 132 130 111       Results from last 7 days   Lab Units 12/10/19  0757 12/09/19  2327 12/09/19  1514 12/09/19  0849  12/08/19  0616   INR  1 02  --   --  0 97  --  0 98   PTT seconds 61* 147* 35 57*   < > 59*    < > = values in this interval not displayed  Invalid input(s): TNI,  PCT              IMAGING:  CT head wo contrast   Final Result   No acute hemorrhage seen      Evolving changes from venous sinus thrombosis, visualization of the hypodense area in the right perirolandic region may be due to developing venous infarct, alternatively this may be post ictal   This area was visible on the previous MRI however not seen    on the previous CT from December 5, 2019      Mild low-density noted within the subcortical white matter bilaterally mildly more pronounced on the right side  Dilated serpiginous tubular channels with increased density in the bilateral parietal convexities along with heterogenous appearance of the superior sagittal sinus related to venous sinus thrombosis                  Workstation performed: [id number]         MRI brain w wo contrast   Final Result         1  Redemonstration of thrombus in the superior sagittal sinus  Bilateral cortical veins along the posterior aspect of the proximal convexities are also thrombosed  Significant venous congestion within smaller draining veins along the bilateral    parietal convexities, greater on the right  2   Minimally restricted diffusion in the perirolandic region bilaterally, though more prominent on the right, additionally with mild gyral swelling and T2 hyperintensity in the right central and precentral gyri  Findings suggestive of vasogenic edema    and ischemia without clear evidence of infarct secondary to venous sinus and cortical vein thrombosis  No evidence of hemorrhage  3   Focal mild gyral swelling in the right parietal region  No generalized cerebral edema or significant mass effect  I personally discussed this study with Conrad Gonsalez on 12/6/2019 at 1:12 AM                Workstation performed: [id number]         MRV head wo contrast   Final Result      Stable thrombus in the mid and posterior aspects of the superior sagittal sinus  Thrombosis of bilateral cortical veins along the posterior parietal convexity  I personally discussed this study with Conrad Gonsalez on 12/6/2019 at 1:18 AM             Workstation performed: [id number]         CTA head and neck with and without contrast   Final Result      Superior sagittal sinus thrombosis  Contrast-enhanced brain MRI with bravo sequence recommended for further evaluation                 Workstation performed: [id number]

## 2019-12-10 NOTE — RESTORATIVE TECHNICIAN NOTE
Restorative Specialist Mobility Note       Activity: Ambulate in martinez, Ambulate in room, Bathroom privileges, Chair, Dangle, Stand at bedside(Educated/encouraged pt to ambulate with assistance 3-4 x's/day   Pt callbell, phone/tray within reach )     Assistive Device: None       Magalis AUGUSTIN, Restorative Technician, [employer] [facility]

## 2019-12-10 NOTE — ASSESSMENT & PLAN NOTE
Postop RLTCS on 11/29/19  Ob/Gyn consulted on admission and following  Pt is meeting all postop goals  Per Ob/Gyn: continue routine postpartum/postoperative care  Tylenol PRN for pain  Discussed with patient regarding breast-feeding - said that she is not planning on breastfeeding going forward

## 2019-12-10 NOTE — PROGRESS NOTES
Dr Chamorro Head called RN to remove the rebolus order for her heparin gtt  I made them aware that the bolus for 5pm was already given  Changes have been made to the heparin gtt order and it says to NOT give rebolus  Will continue to monitor   Will recheck PTT at 11pm

## 2019-12-10 NOTE — PROGRESS NOTES
Progress Note - Jarocho Campos 1995, 21 y o  female MRN: [id number]    Unit/Bed#: [facility] 713-01 Encounter: [id number]    Primary Care Provider: Moraima Guajardo DO   Date and time admitted to hospital: 2019  4:23 PM        * Thrombosis of superior sagittal sinus  Assessment & Plan  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 and surrounding R parietal edema complicated by seizure  Unclear etiology of CVT - potentially post-partum hypercoagulability vs  underlying hypercoagulable/autoimmune disease  Neurology consulted on admission and following   -Patient's symptoms (LUE weakness numbness) have completely resolved  -MRI and CTA reviewed - superior sagittal thrombosis with adjacent cortical vein thrombosis and edema, no clear infarct/hemorrhage  -HCT- no hemorrhage  Continue heparin drip with bridge to Coumadin  Will increase Coumadin 10 mg tonight  Can consider switching to Lovenox and discharging patient home with Lovenox and Coumadin  Will check with Neurology and will need price check for Lovenox   -continue Keppra 750mg BID  -thrombosis panel - ATIII normal, anticardiolipin IgG indeterminate and IgM low positive; rest pending  - MORENA, ANCA pending  - Per neuro: will need at least 3-6 months of AC with plan to repeat MRV in about 3 months; may need lifelong if evidence of underlying hypercoagulable state emerges  -will need follow-up with stroke clinic as outpatient  Follow on PT INR    Hematuria  Assessment & Plan  Since afternoon  Possibly due to recent  and also being on IV heparin  Continue for now  Patient denies any dysuria or increased frequency  Check H&H tomorrow  Seizure [facility])  Assessment & Plan  No repeat seizure activity since admission  Seizure most likely subsequent to CVT of superior sagittal sinus  Neuro consulted on admission and following  Continue Keppra 750mg BID        Status post  delivery  Assessment & Plan  Postop RLTCS on 19  Ob/Gyn consulted on admission and following  Pt is meeting all postop goals  Per Ob/Gyn: continue routine postpartum/postoperative care  Tylenol PRN for pain  Discussed with patient regarding breast-feeding - said that she is not planning on breastfeeding going forward  VTE Pharmacologic Prophylaxis:   Pharmacologic: Heparin Drip  Mechanical VTE Prophylaxis in Place: Yes    Patient Centered Rounds: I have performed bedside rounds with nursing staff today  Discussions with Specialists or Other Care Team Provider: neuro    Education and Discussions with Family / Patient: pt    Time Spent for Care: 30 minutes  More than 50% of total time spent on counseling and coordination of care as described above  Current Length of Stay: 5 day(s)    Current Patient Status: Inpatient   Certification Statement: The patient will continue to require additional inpatient hospital stay due to above    Discharge Plan: pending therapeutic INR    Code Status: Level 1 - Full Code      Subjective:   Pt seen and examined by me this morning  Pt denies any complaints this morning  Later in the afternoon patient had hematuria - 1 episode  Objective:     Vitals:   Temp (24hrs), Av 7 °F (37 1 °C), Min:98 6 °F (37 °C), Max:98 8 °F (37 1 °C)    Temp:  [98 6 °F (37 °C)-98 8 °F (37 1 °C)] 98 6 °F (37 °C)  Resp:  [16] 16  BP: (128-132)/(73-81) 128/73  Body mass index is 38 78 kg/m²  Input and Output Summary (last 24 hours): Intake/Output Summary (Last 24 hours) at 12/10/2019 1710  Last data filed at 12/10/2019 1100  Gross per 24 hour   Intake  82 ml   Output    Net  82 ml       Physical Exam:     Physical Exam    Constitutional: Pt appears comfortable  Not in any acute distress  HENT:   Head: Normocephalic and atraumatic  Eyes: EOM are normal    Neck: Neck supple  Cardiovascular: Normal rate, regular rhythm, normal heart sounds  No murmur heard    Pulmonary/Chest: Effort normal, air entry b/l equal  No respiratory distress  Pt has no wheezes or crackles  Abdominal: Soft  Non-distended, Non-tender  Bowel sounds are normal    Musculoskeletal: Normal range of motion  Neurological: alert and oriented to person, place, and time  Moving all extremities spontaneously  Psychiatric: normal mood and affect  Additional Data:     Labs:    Results from last 7 days   Lab Units 12/09/19  0627   WBC Thousand/uL 5 55   HEMOGLOBIN g/dL 10 9*   HEMATOCRIT % 34 9   PLATELETS Thousands/uL 311   NEUTROS PCT % 48   LYMPHS PCT % 40   MONOS PCT % 8   EOS PCT % 2     Results from last 7 days   Lab Units 12/09/19  0627  12/07/19  0228   SODIUM mmol/L 139   < > 139   POTASSIUM mmol/L 3 9   < > 4 1   CHLORIDE mmol/L 108   < > 108   CO2 mmol/L 27   < > 25   BUN mg/dL 14   < > 15   CREATININE mg/dL 0 67   < > 0 66   ANION GAP mmol/L 4   < > 6   CALCIUM mg/dL 8 9   < > 8 1*   ALBUMIN g/dL  --   --  2 2*   TOTAL BILIRUBIN mg/dL  --   --  <0 10*   ALK PHOS U/L  --   --  115   ALT U/L  --   --  19   AST U/L  --   --  9   GLUCOSE RANDOM mg/dL 84   < > 140    < > = values in this interval not displayed  Results from last 7 days   Lab Units 12/10/19  0757   INR  1 02         Results from last 7 days   Lab Units 12/06/19  0446   HEMOGLOBIN A1C % 5 7               * I Have Reviewed All Lab Data Listed Above  * Additional Pertinent Lab Tests Reviewed:  Sil 66 Admission Reviewed    Imaging:    Imaging Reports Reviewed Today Include:   Imaging Personally Reviewed by Myself Includes:      Recent Cultures (last 7 days):           Last 24 Hours Medication List:     Current Facility-Administered Medications:  acetaminophen 650 mg Oral Q4H PRN Aretha Juarez MD    baclofen 10 mg Oral Q8H PRN Aretha Juarez MD    heparin (porcine) 3-30 Units/kg/hr (Order-Specific) Intravenous Titrated Neymar Hinton DO Last Rate: 12 Units/kg/hr (12/10/19 1605)   heparin (porcine) 4,200 Units Intravenous PRN Douglas Viramontes Luh Bray MD    heparin (porcine) 8,400 Units Intravenous PRN Nick Hay MD    Labetalol HCl 10 mg Intravenous Q6H PRN Nick Hay MD    levETIRAcetam 750 mg Oral Q12H [facility] Nick Hay MD    predniSONE 40 mg Oral Daily Nick Hay MD    simethicone 80 mg Oral Q6H PRN Nick Hay MD    sodium chloride 25 mL/hr Intravenous Continuous Nick Hay MD Last Rate: 25 mL/hr (12/09/19 0900)   warfarin 10 mg Oral Daily (warfarin) Indra Barger MD         Today, Patient Was Seen By: Indra Barger MD    ** Please Note: Dictation voice to text software may have been used in the creation of this document   **

## 2019-12-10 NOTE — PROGRESS NOTES
Progress Note - OB/GYN   Kamran Mace 21 y o  female MRN: [id number]  Unit/Bed#: [facility] 879-58 Encounter: [id number]    Assessment:  Bryanella Colon is a 21yo [id number] MDR#45 from uncomplicated, scheduled repeat low transverse  section admitted s/p seizure on POD#6 subsequently found to have superior sagittal sinus thrombosis    Plan:  1  Superior sagittal sinus thrombosis:   - s/p CT/CTA/MRI/MRV confirmation  - Neurology following   - Continue heparin drip with transition to Warfarin starting   - Thrombosis panel and workup per neurology  - Continue management per SLIM and Neurology    2  Grandmal Seizure  - Most likely due to superior sagittal sinus thrombosis  - No further seizure activity reported  - Continue Keppra 750mg BID    3  Post partum / Postoperative managment  - Continue routine post partum care  - Encourage ambulation  - Encourage breastfeeding/ pumping  - Tylenol PRN    4   alloimmune thrombocytopenia  - s/p IVIG  - s/p 48 hours of solumedrol postpartum  - Continue Prednisone taper    5  FEN: Regular diet    6  VTE PPx: Heparin GTT with transition to Warfarin for therapeutic anticoagulation  Subjective/Objective     Subjective:   She is doing well  She has no complaints at this time  She is breastfeeding, tolerating PO, ambulating, voiding and has appropriate bowel function  Her bleeding is minimal  She denies chest pain, shortness of breath, or leg pain  Objective:     Vitals: /81   Pulse 85   Temp 98 8 °F (37 1 °C)   Resp 18   Ht 5' 5" (1 651 m)   Wt 106 kg (233 lb 0 4 oz)   LMP 2019 (Exact Date)   SpO2 98%   BMI 38 78 kg/m²     No intake or output data in the 24 hours ending 12/10/19 0658    Lab Results   Component Value Date    WBC 5 55 2019    HGB 10 9 (L) 2019    HCT 34 9 2019    MCV 88 2019     2019       Physical Exam:   Physical Exam   Constitutional: She is oriented to person, place, and time   She appears well-developed and well-nourished  Cardiovascular: Normal rate, regular rhythm and normal heart sounds  Exam reveals no gallop and no friction rub  No murmur heard  Pulmonary/Chest: Effort normal and breath sounds normal  No stridor  No respiratory distress  She has no wheezes  Abdominal: Soft  She exhibits no distension  There is no tenderness  There is no guarding  Incision clean, dry, and intact without evidence of infection, erythema, or bleeding   Neurological: She is alert and oriented to person, place, and time  She has normal strength  No sensory deficit  Skin: Skin is warm and dry  Psychiatric: She has a normal mood and affect  Her behavior is normal    Vitals reviewed        Shorty Lozano MD  12/10/2019  6:58 AM

## 2019-12-11 ENCOUNTER — TELEPHONE (OUTPATIENT)
Dept: NEUROLOGY | Facility: CLINIC | Age: 24
End: 2019-12-11

## 2019-12-11 VITALS
DIASTOLIC BLOOD PRESSURE: 70 MMHG | WEIGHT: 233.03 LBS | BODY MASS INDEX: 38.82 KG/M2 | HEART RATE: 85 BPM | TEMPERATURE: 98.4 F | OXYGEN SATURATION: 98 % | SYSTOLIC BLOOD PRESSURE: 116 MMHG | HEIGHT: 65 IN | RESPIRATION RATE: 16 BRPM

## 2019-12-11 PROBLEM — N93.9 VAGINAL BLEEDING: Status: ACTIVE | Noted: 2019-12-10

## 2019-12-11 LAB
APTT PPP: 54 SECONDS (ref 23–37)
APTT PPP: 93 SECONDS (ref 23–37)
C-ANCA TITR SER IF: NORMAL TITER
HCT VFR BLD AUTO: 34.1 % (ref 34.8–46.1)
HGB BLD-MCNC: 10.5 G/DL (ref 11.5–15.4)
INR PPP: 1.23 (ref 0.84–1.19)
MYELOPEROXIDASE AB SER IA-ACNC: <9 U/ML (ref 0–9)
P-ANCA ATYPICAL TITR SER IF: NORMAL TITER
P-ANCA TITR SER IF: NORMAL TITER
PROTEINASE3 AB SER IA-ACNC: <3.5 U/ML (ref 0–3.5)
PROTHROMBIN TIME: 15.1 SECONDS (ref 11.6–14.5)

## 2019-12-11 PROCEDURE — 99239 HOSP IP/OBS DSCHRG MGMT >30: CPT | Performed by: INTERNAL MEDICINE

## 2019-12-11 PROCEDURE — NC001 PR NO CHARGE: Performed by: OBSTETRICS & GYNECOLOGY

## 2019-12-11 PROCEDURE — 85730 THROMBOPLASTIN TIME PARTIAL: CPT | Performed by: INTERNAL MEDICINE

## 2019-12-11 PROCEDURE — 85014 HEMATOCRIT: CPT | Performed by: INTERNAL MEDICINE

## 2019-12-11 PROCEDURE — 85610 PROTHROMBIN TIME: CPT | Performed by: PHYSICIAN ASSISTANT

## 2019-12-11 PROCEDURE — 85018 HEMOGLOBIN: CPT | Performed by: INTERNAL MEDICINE

## 2019-12-11 RX ORDER — PREDNISONE 10 MG/1
TABLET ORAL
Qty: 42 TABLET | Refills: 0 | Status: SHIPPED | OUTPATIENT
Start: 2019-12-11 | End: 2020-02-03 | Stop reason: ALTCHOICE

## 2019-12-11 RX ORDER — WARFARIN SODIUM 5 MG/1
5 TABLET ORAL
Qty: 15 TABLET | Refills: 0 | Status: SHIPPED | OUTPATIENT
Start: 2019-12-11 | End: 2020-07-24 | Stop reason: ALTCHOICE

## 2019-12-11 RX ORDER — LEVETIRACETAM 750 MG/1
750 TABLET ORAL EVERY 12 HOURS SCHEDULED
Qty: 90 TABLET | Refills: 0 | Status: SHIPPED | OUTPATIENT
Start: 2019-12-11 | End: 2019-12-26 | Stop reason: SDUPTHER

## 2019-12-11 RX ADMIN — ENOXAPARIN SODIUM 105 MG: 120 INJECTION SUBCUTANEOUS at 15:36

## 2019-12-11 RX ADMIN — LEVETIRACETAM 750 MG: 750 TABLET ORAL at 08:36

## 2019-12-11 RX ADMIN — PREDNISONE 40 MG: 20 TABLET ORAL at 08:36

## 2019-12-11 NOTE — ASSESSMENT & PLAN NOTE
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 and surrounding R parietal edema complicated by seizure  Unclear etiology of CVT - potentially post-partum hypercoagulability vs  underlying hypercoagulable/autoimmune disease  Neurology consulted on admission and following   -Patient's symptoms (LUE weakness numbness) have completely resolved  -MRI and CTA reviewed - superior sagittal thrombosis with adjacent cortical vein thrombosis and edema, no clear infarct/hemorrhage  -HCT- no hemorrhage  -thrombosis panel - ATIII normal, anticardiolipin IgG indeterminate and IgM low positive  - MORENA elevated, ANCA pending  - Per neuro: will need at least 3-6 months of AC with plan to repeat MRV in about 3 months; may need lifelong if evidence of underlying hypercoagulable state emerges  -will need follow-up with stroke clinic as outpatient    Discussed anticoagulation option with Hematology  They recommend Coumadin rather than NOAC  Patient really wanted to go home today, she will be discharged on Lovenox with bridge to Coumadin  She will need close monitoring of her INR  INR check in 2 days  She said she will follow up with [facility]  Lovenox teaching was done prior to discharge

## 2019-12-11 NOTE — SOCIAL WORK
CM was requested to check cost of Lovenox  Pt prefers [employer]  Script sent to [facility] include [facility] beds at the [facility]; CM was informed by Dhaval Gonzalez at [facility] beds at the [facility] that copay is $20  Met with pt to discuss same  Pt is agreeable  Medications delivered to pt's room  Pt's bedside CALIXTO Sharma aware of same  Pt's boyfriend to transport home

## 2019-12-11 NOTE — PROGRESS NOTES
Patient has been discharged home  Prior to discharge patient was ab;e to demonstrate insertion/administration of lovenox  Patient was also given lovenox teaching kit  Coumadin 10mg was given prior to discharge  Patient has verbalized understanding off all follow up care including risk of bleeding, follows and ordered lab work

## 2019-12-11 NOTE — TELEPHONE ENCOUNTER
Scheduled 2/13 with Dr Elba Castleman in [place]  Added to wait list for all APs in [place]  Will follow up with patient via in person

## 2019-12-11 NOTE — TELEPHONE ENCOUNTER
Provided new patient packet via in person  Addressed all her questions  Please see my notes from hospital encounter 12/5 for more information

## 2019-12-11 NOTE — DISCHARGE SUMMARY
Discharge- Dinorah Ward 1995, 21 y o  female MRN: [id number]    Unit/Bed#: [facility] 713-01 Encounter: [id number]    Primary Care Provider: Alen Uribe DO   Date and time admitted to hospital: 2019  4:23 PM        * Thrombosis of superior sagittal sinus  Assessment & Plan  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 and surrounding R parietal edema complicated by seizure  Unclear etiology of CVT - potentially post-partum hypercoagulability vs  underlying hypercoagulable/autoimmune disease  Neurology consulted on admission and following   -Patient's symptoms (LUE weakness numbness) have completely resolved  -MRI and CTA reviewed - superior sagittal thrombosis with adjacent cortical vein thrombosis and edema, no clear infarct/hemorrhage  -HCT- no hemorrhage  -thrombosis panel - ATIII normal, anticardiolipin IgG indeterminate and IgM low positive  - MORENA elevated, ANCA pending  - Per neuro: will need at least 3-6 months of AC with plan to repeat MRV in about 3 months; may need lifelong if evidence of underlying hypercoagulable state emerges  -will need follow-up with stroke clinic as outpatient    Discussed anticoagulation option with Hematology  They recommend Coumadin rather than NOAC  Patient really wanted to go home today, she will be discharged on Lovenox with bridge to Coumadin  She will need close monitoring of her INR  INR check in 2 days  She said she will follow up with [facility]  Lovenox teaching was done prior to discharge  Vaginal bleeding  Assessment & Plan  None this morning  Most likely vaginal bleeding per patient status post recent   Was very minimal yesterday  Hemoglobin stable  Repeat CBC in 2 days  Outpatient follow-up with OB Gyne  Seizure [facility])  Assessment & Plan  No repeat seizure activity since admission  Seizure most likely subsequent to CVT of superior sagittal sinus    Neuro consulted on admission and following  Continue Keppra 750mg BID  Status post  delivery  Assessment & Plan  Postop RLTCS on 19  Ob/Gyn consulted on admission and following  Pt is meeting all postop goals  Per Ob/Gyn: continue routine postpartum/postoperative care  Tylenol PRN for pain  Discussed with patient regarding breast-feeding - said that she is not planning on breastfeeding going forward  Alloimmune thrombocytopenia during pregnancy, antepartum  Assessment & Plan  Status post IVIG, Solumedrol  Continue prednisone taper 10 mg every week for OB recommendations  Outpatient follow-up with Ob as scheduled  Discharging Physician / Practitioner: Alejandra Burgess MD  PCP: Michael Rivero DO  Admission Date:   Admission Orders (From admission, onward)     Ordered        19 1708  Inpatient Admission  Once                   Discharge Date: 19    Resolved Problems  Date Reviewed: 2019          Resolved    Hypertension complicating pregnancy, delivered, current hospitalization 2019     Resolved by  Sheilda Soulier, [address] [place] [address] Stay:  · Neurology, Ob-Gyn, Hem-Onc    Procedures Performed:   · none    Significant Findings / Test Results:     CT head wo contrast   Final Result by Elias Ansari MD ([id number])   No acute hemorrhage seen      Evolving changes from venous sinus thrombosis, visualization of the hypodense area in the right perirolandic region may be due to developing venous infarct, alternatively this may be post ictal   This area was visible on the previous MRI however not seen    on the previous CT from 2019      Mild low-density noted within the subcortical white matter bilaterally mildly more pronounced on the right side        Dilated serpiginous tubular channels with increased density in the bilateral parietal convexities along with heterogenous appearance of the superior sagittal sinus related to venous sinus thrombosis                  Workstation performed: [id number]         MRI brain w wo contrast   Final Result by Sebas Rogers MD (12/06 0112)         1  Redemonstration of thrombus in the superior sagittal sinus  Bilateral cortical veins along the posterior aspect of the proximal convexities are also thrombosed  Significant venous congestion within smaller draining veins along the bilateral    parietal convexities, greater on the right  2   Minimally restricted diffusion in the perirolandic region bilaterally, though more prominent on the right, additionally with mild gyral swelling and T2 hyperintensity in the right central and precentral gyri  Findings suggestive of vasogenic edema    and ischemia without clear evidence of infarct secondary to venous sinus and cortical vein thrombosis  No evidence of hemorrhage  3   Focal mild gyral swelling in the right parietal region  No generalized cerebral edema or significant mass effect  I personally discussed this study with Emmett Infante on 12/6/2019 at 1:12 AM                Workstation performed: [id number]         MRV head wo contrast   Final Result by Sebas Roegrs MD (12/06 0119)      Stable thrombus in the mid and posterior aspects of the superior sagittal sinus  Thrombosis of bilateral cortical veins along the posterior parietal convexity  I personally discussed this study with Emmett Infante on 12/6/2019 at 1:18 AM             Workstation performed: [id number]         CTA head and neck with and without contrast   Final Result by Ru Fierro MD (12/05 1859)      Superior sagittal sinus thrombosis  Contrast-enhanced brain MRI with bravo sequence recommended for further evaluation                 Workstation performed: [id number]              Incidental Findings:   · none     Test Results Pending at Discharge (will require follow up):   · none     Outpatient Tests Requested:  · INR in 2 days  · CBC in 1 week    Complications:  none    Reason for Admission:  Seizures    Hospital Course:     Bell Langley is a 21 y o  female patient who originally presented to the hospital on 2019 due to new onset seizures  Patient had witnessed seizures at home  Patient is status post scheduled  section  Patient was receiving IVIG for new little elevated immune thrombocytopenia  She was also getting Solu-Medrol postpartum  Patient was also postictal and had tongue laceration  Imaging showed superior sagittal sinus thrombosis as above  Patient was evaluated by Neurology  Was started on Keppra for seizures  Patient was also started on heparin for anticoagulation  Patient was transferred out of ICU to internal medicine service  No seizures since she has transferred out of ICU  Had mild headache which has resolved today  Hematology was also consulted who recommended Coumadin for anticoagulation  Patient will be discharged home today with a Lovenox bridge to Coumadin  She will follow up with [facility] for INR check and adjusting the dose of Coumadin  She will need close outpatient follow-up with Neurology with repeat imaging with MRV in 3 months  Please see above list of diagnoses and related plan for additional information  Condition at Discharge: good     Discharge Day Visit / Exam:     Subjective:  Pt seen and examined by me this morning  Pt denies any complaints  Vaginal bleeding has stopped  No headache this morning  Vitals: Blood Pressure: 116/70 (19 1512)  Pulse: 85 (19 1206)  Temperature: 98 4 °F (36 9 °C) (19 0810)  Temp Source: Oral (19 0733)  Respirations: 16 (19 0810)  Height: 5' 5" (165 1 cm) (19 1644)  Weight - Scale: 106 kg (233 lb 0 4 oz) (19 1930)  SpO2: 98 % (19 1206)     Exam:   Physical Exam    Constitutional: Pt appears comfortable  Not in any acute distress  HENT:   Head: Normocephalic and atraumatic  Eyes: EOM are normal    Neck: Neck supple  Cardiovascular: Normal rate, regular rhythm, normal heart sounds  No murmur heard  Pulmonary/Chest: Effort normal, air entry b/l equal  No respiratory distress  Pt has no wheezes or crackles  Abdominal: Soft  Non-distended, Non-tender  Bowel sounds are normal    Musculoskeletal: Normal range of motion  Neurological: alert and oriented to person, place, and time  Moving all extremities spontaneously  Psychiatric: normal mood and affect  Discussion with Family: pt and boyfriend at bedside    Discharge instructions/Information to patient and family:   See after visit summary for information provided to patient and family  Provisions for Follow-Up Care:  See after visit summary for information related to follow-up care and any pertinent home health orders  Disposition:     Home    For Discharges to [facility] SNF:   · Not Applicable to this Patient - Not Applicable to this Patient    Planned Readmission: no     Discharge Statement:  I spent 40 minutes discharging the patient  This time was spent on the day of discharge  I had direct contact with the patient on the day of discharge  Greater than 50% of the total time was spent examining patient, answering all patient questions, arranging and discussing plan of care with patient as well as directly providing post-discharge instructions  Additional time then spent on discharge activities  Discharge Medications:  See after visit summary for reconciled discharge medications provided to patient and family        ** Please Note: This note has been constructed using a voice recognition system **

## 2019-12-11 NOTE — ASSESSMENT & PLAN NOTE
Status post IVIG, Solumedrol  Continue prednisone taper 10 mg every week for OB recommendations  Outpatient follow-up with Ob as scheduled

## 2019-12-11 NOTE — PROGRESS NOTES
Progress Note - OB/GYN   Roxie Person 21 y o  female MRN: [id number]  Unit/Bed#: [facility] 923-76 Encounter: [id number]    Assessment:  Brian Pate is a 21yo [id number] LPB#67 from uncomplicated, scheduled repeat low transverse  section admitted s/p seizure on POD#6 subsequently found to have superior sagittal sinus thrombosis    Plan:  1  Superior sagittal sinus thrombosis:   - s/p CT/CTA/MRI/MRV confirmation  - Neurology following   - Continue heparin drip with transition to Warfarin starting 12/  - Thrombosis panel and workup per neurology  - Continue management per SLIM and Neurology    2  Grandmal Seizure  - Most likely due to superior sagittal sinus thrombosis  - No further seizure activity reported  - Continue Keppra 750mg BID    3  Post partum / Postoperative managment  - Continue routine post partum care  - Encourage ambulation  - Patient not going to be breastfeeding; discussed care for non-breast feeding moms; will provide more info in discharge instructions  - Tylenol PRN    4   alloimmune thrombocytopenia  - s/p IVIG  - s/p 48 hours of solumedrol postpartum  - Continue Prednisone taper    5  FEN: Regular diet    6  VTE PPx: Heparin GTT with transition to Warfarin for therapeutic anticoagulation  Subjective/Objective     Subjective:   She is doing well  She has no complaints at this time  She is breastfeeding, tolerating PO, ambulating, voiding and has appropriate bowel function  Her bleeding is minimal  She denies chest pain, shortness of breath, or leg pain      Objective:     Vitals: /88   Pulse 85   Temp 98 6 °F (37 °C)   Resp 16   Ht 5' 5" (1 651 m)   Wt 106 kg (233 lb 0 4 oz)   LMP 2019 (Exact Date)   SpO2 98%   BMI 38 78 kg/m²       Intake/Output Summary (Last 24 hours) at 2019 0650  Last data filed at 2019 0437  Gross per 24 hour   Intake 2697 82 ml   Output 400 ml   Net 2297 82 ml       Lab Results   Component Value Date    WBC 5 55 12/09/2019    HGB 10 5 (L) 12/11/2019    HCT 34 1 (L) 12/11/2019    MCV 88 12/09/2019     12/09/2019       Physical Exam:   Physical Exam   Constitutional: She is oriented to person, place, and time  She appears well-developed and well-nourished  Cardiovascular: Normal rate, regular rhythm and normal heart sounds  Exam reveals no gallop and no friction rub  No murmur heard  Pulmonary/Chest: Effort normal and breath sounds normal  No stridor  No respiratory distress  She has no wheezes  Abdominal: Soft  She exhibits no distension  There is no tenderness  There is no guarding  Incision clean, dry, and intact without evidence of infection, erythema, or bleeding   Neurological: She is alert and oriented to person, place, and time  She has normal strength  No sensory deficit  Skin: Skin is warm and dry  Psychiatric: She has a normal mood and affect  Her behavior is normal    Vitals reviewed        Merle Durham MD  12/11/2019  6:50 AM

## 2019-12-11 NOTE — RESTORATIVE TECHNICIAN NOTE
Restorative Specialist Mobility Note       Activity: Ambulate in martinez, Ambulate in room, Bathroom privileges, Chair, Dangle, Stand at bedside(Educated/encouraged pt to ambulate with assistance 3-4 x's/day   Pt callbell, phone/tray within reach )     Assistive Device: None          Adam Foot BS, Restorative Technician, [employer]

## 2019-12-11 NOTE — ASSESSMENT & PLAN NOTE
None this morning  Most likely vaginal bleeding per patient status post recent   Was very minimal yesterday  Hemoglobin stable  Repeat CBC in 2 days  Outpatient follow-up with OB Gyne

## 2019-12-11 NOTE — TELEPHONE ENCOUNTER
----- Message from Shanel Craven PA-C sent at 12/7/2019  3:28 PM EST -----  Regarding: Delio Paredes requesting patient be scheduled for stroke follow-up  May see either attending or AP as per Dr Antwon Paredes

## 2019-12-11 NOTE — DISCHARGE INSTRUCTIONS
Please get you blood test for INR in 2 days and follow up with your family doctor about adjusting the dose of coumadin  You should stop lovenox inj once your INR is 2 0 or more        Self Care After Delivery   AMBULATORY CARE:   The postpartum period  is the period of time from delivery to about 6 weeks  During this time you may experience many physical and emotional changes  It is important to understand what is normal and when you need to call your healthcare provider  It is also important to know how to care for yourself during this time  Call 911 for any of the following:   · You soak through 1 pad in 15 minutes, have blurry vision, clammy or pale skin, and feel faint  · You faint or lose consciousness  · Your heart is beating faster than normal      · You have trouble breathing  · You cough up blood  Seek care immediately if:   · You soak through 1 or more pads in an hour, or pass blood clots larger than a quarter from your vagina  · Your leg is painful, red, and larger than normal      · You have severe abdominal pain  · You have a bad headache or changes in your vision  · Your episiotomy or C section incision is red, swollen, bleeding, or draining pus  · Your incision comes apart  · You see or hear things that are not there, or have thoughts of harming yourself or your baby  Contact your obstetrician or midwife if:   · You have a fever  · You have new or worsening pain in your abdomen or vagina  · You continue to have the baby blues 10 days after you deliver  · You have trouble sleeping  · You have foul-smelling discharge from your vagina  · You have pain or burning when you urinate  · You do not have a bowel movement for 3 days or more  · You have nausea or are vomiting  · You have hard lumps or red streaks over your breasts  · You have cracked nipples or bleed from your nipples       · You have questions or concerns about your condition or care  Physical changes: The following are normal changes after you give birth:  · Pain in the area between your anus and vagina    · Breast pain    · Constipation or hemorrhoids    · Hot or cold flashes    · Vaginal bleeding or discharge    · Mild to moderate abdominal cramping    · Difficulty controlling bowel movements or urine  Emotional changes: The following are symptoms of the baby blues, or normal emotions after you give birth  The changes in your emotions may be caused by a drop in hormone levels after you deliver  If these symptoms last longer than 1 to 2 weeks after you give birth, you may have postpartum depression  · Feeling irritable    · Feeling sad    · Crying for no reason    · Feeling anxious  Breast care for nursing mothers: You may have sore breasts for 3 to 6 days after you give birth  This happens as your milk begins to fill your breasts  You may also have sore breasts if you do not breastfeed frequently  Do the following to care for your breasts:  · Apply a moist, warm, compress to your breast as directed  This may help soothe your breasts  Make sure the washcloth is not too hot before you apply it to your breast      · Nurse your baby or pump your milk frequently  This may prevent clogged milk ducts  Ask your healthcare provider how often to nurse or pump  · Massage your breasts as directed  This may help increase your milk flow  Gently rub your breasts in a circular motion before you breastfeed  You may need to gently squeeze your breast or nipple to help release milk  You can also use a breast pump to help release milk from your breast      · Wash your breasts with warm water only  Do not put soap on your nipples  Soap may cause your nipples to become dry  · Apply lanolin cream to your nipples as directed  Lanolin cream may add moisture to your skin and prevent nipple dryness  Always  wash off lanolin cream with warm water before you breastfeed       · Place pads in your bra   Your nipples may leak milk when you are not breastfeeding  You can place pads inside of your bra to help prevent leaking onto your clothing  Ask your healthcare provider where to purchase bra pads  · Get breastfeeding support if needed  There are healthcare providers who can answer questions about breastfeeding and provide you with support  Ask your healthcare provider who you can contact if you need breastfeeding support  Breast care for non-nursing mothers:  Milk will fill your breasts even if you bottle feed your baby  Do the following to help stop your milk from filling your breasts and causing pain:  · Wear a bra with support at all times  A sports bra or a tight-fitting bra will help stop your milk from coming in  · Apply ice on each breast for 15 to 20 minutes every hour or as directed  Use an ice pack, or put crushed ice in a plastic bag  Cover it with a towel  Ice helps your milk ducts shrink  · Keep your breasts away from warm water  Warm water will make it easier for milk to fill your breasts  Stand with your breasts away from warm water in the shower  · Limit how much you touch your breasts  This will prevent them from filling with milk  Perineum care: Your perineum is the area between your rectum and vagina  It is normal to have swelling and pain in this area after you give birth  If you had an episiotomy, your healthcare provider may give you special instructions  · Clean your perineum after you use the bathroom  This may prevent infection and help with healing  Use a spray bottle with warm water to clean your perineum  You may also gently spray warm water against your perineum when you urinate  Always wipe front to back  · Take a sitz bath as directed  A sitz bath may help relieve swelling and pain  Fill your bath tub or bucket with water up to your hips and sit in the water  Use cold water for 2 days after you deliver  Then use warm water   Ask your healthcare provider for more information about a sitz bath  · Apply ice packs for the first 24 hours or as directed  Use a plastic glove filled with ice or buy an ice pack  Wrap the ice pack or plastic glove in a small towel or wash cloth  Place the ice pack on your perineum for 20 minutes at a time  · Sit on a donut-shaped pillow  This may relieve pressure on your perineum when you sit  · Use wipes with medicine or take pills as directed  Your healthcare provider may tell you to use witch hazel pads  You can place witch hazel pads in the refrigerator before you apply them to your perineum  He may also tell you to take NSAIDs  Ask your healthcare provider how often to take pills or use wipes with medicine  · Do not go swimming or take tub baths for 4 to 6 weeks or as directed  This will help prevent an infection in your vagina or uterus  Bowel and bladder care: It may take 3 to 5 days to have a bowel movement after you deliver your baby  You can do the following to prevent or manage constipation, and get control of your bowel or bladder:  · Take stool softeners as directed  A stool softener is medicine that will make your bowel movements softer  This may prevent or relieve constipation  A stool softener may also make bowel movements less painful  · Drink plenty of liquids  Ask how much liquid to drink each day and which liquids are best for you  Liquids may help prevent constipation  · Eat foods high in fiber  Examples include fruits, vegetables, grains, beans, and lentils  Ask your healthcare provider how much fiber you need each day  Fiber may prevent constipation  · Do Kegel exercises as directed  Kegel exercises will help strengthen the muscles that control bowel movements and urination  Ask your healthcare provider for more information on Kegel exercises  · Apply cold compresses or medicine to hemorrhoids as directed  This may relieve swelling and pain   Your healthcare provider may tell you to apply ice or wipes with medicine to your hemorrhoids  He may also tell you to use a sitz bath  Ask your healthcare for more information on how to manage hemorrhoids  Nutrition:  Good nutrition is important in the postpartum period  It will help you return to a healthy weight, increase your energy levels, and prevent constipation  It will also help you get enough nutrients and calories if you are going to breastfeed your baby  · Eat a variety of healthy foods  Healthy foods include fruits, vegetables, whole-grain breads, low-fat dairy products, beans, lean meats, and fish  You may need 500 to 700 additional calories each day if you breastfeed your baby  You may also need extra protein  · Limit foods with added sugar and high amounts of fat  These foods are high in calories and low in healthy nutrients  Read food labels so you know how much sugar and fat is in the food you want to eat  · Drink 8 to 10 glasses of water per day  Water will help you make plenty of milk for your baby  It will also help prevent constipation  Drink a glass of water every time you breastfeed your baby  · Take vitamins as directed  Ask your healthcare provider what vitamins you need  · Limit caffeine and alcohol if you are breastfeeding  Caffeine and alcohol can get into your breast milk  Caffeine and alcohol can make your baby fussy  They can also interfere with your baby's sleep  Ask your healthcare provider if you can drink alcohol or caffeine  Rest and sleep: You may feel very tired in the postpartum period  Enough sleep will help you heal and give you energy to care for your baby  The following may help you get sleep and rest:  · Nap when your baby naps  Your baby may nap several times during the day  Get rest during this time  · Limit visitors  Many people may want to see you and your baby  Ask friends or family to visit on different days   This will give you time to rest      · Do not plan too much for one day  Put off household chores so that you have time to rest  Gradually do more each day  · Ask for help from family, friends, or neighbors  Ask them to help you with laundry, cleaning, or errands  Also ask someone to watch the baby while you take a nap or relax  Ask your partner to help with the care of your baby  Pump some of your breast milk so your partner can feed your baby during the night  Exercise after delivery:  Wait until your healthcare provider says it is okay to exercise  Exercise can help you lose weight, increase your energy levels, and manage your mood  It can also prevent constipation and blood clots  Start with gentle exercises such as walking  Do more as you have more energy  You may need to avoid abdominal exercises for 1 to 2 weeks after you deliver  Talk to your healthcare provider about an exercise plan that is right for you  Sexual activity after delivery:   · Do not have sex until your healthcare provider says it is okay  You may need to wait 4 to 6 weeks before you have sex  This may prevent infection and allow time to heal      · Your menstrual cycle may begin as soon as 3 weeks after you deliver  Your period may be delayed if you breastfeed your baby  You can become pregnant before you get your first postpartum period  Talk to your healthcare provider about birth control that is right for you  Some types of birth control are not safe during breastfeeding  For support and more information:  Join a support group for new mothers  Ask for help from family and friends with chores, errands, and care of your baby     · Office of Women's Health,  Department of Health and Human Services  [address], [address] [facility] [address]  [address], [address] [facility] [address]  Phone:  Dmitriy Sandhu  Web Address: www womenshealth gov  · [facility]  [address] [facility] , [address] [facility] [address]  Web Address: [email or web address]  [email or web address]-care  aspx  Follow up with your obstetrician or midwife as directed: You will need to follow up with your healthcare provider in 2 to 6 weeks after delivery  Write down your questions so you remember to ask them at your visits  © 2017 [address]  Information is for End User's use only and may not be sold, redistributed or otherwise used for commercial purposes  All illustrations and images included in CareNotes® are the copyrighted property of A D A M , Inc  or Theron Monaco  The above information is an  only  It is not intended as medical advice for individual conditions or treatments  Talk to your doctor, nurse or pharmacist before following any medical regimen to see if it is safe and effective for you

## 2019-12-11 NOTE — PROGRESS NOTES
Received appt request for patient to follow up with stroke team  Reviewed chart, currently on stroke pathway  Patient [facility]  Added to wait list for all APs in [place] as well  -    Met with patient to discuss follow up care with outpatient neurology, stroke education, and discharge planning  Explained nurse navigator role  Patient resides with her boyfriend Maria Del Carmen Lopez and a  of 3 weeks old  Patient independent of personal and medical care  States she is "ready to go home today" or "as soon as possible"  Patient also expressed some anxiety abut being alone at home at discharge  Per discharge recommendations from therapy, patient to be discharged home with family support  Patient comfortable with this  States she has a good support system between her dad, boyfriend, and other family  Supported patient emotionally  Provided her a new patient packet with appt information  Also provided stroke education binder and my card  She verbalizes understanding of stroke symptoms and medications  Educated her on personal risk factors and resources as well  Answered all her questions  She verbalizes understanding of teaching  Patient does not have any further questions or concerns at this time

## 2019-12-12 NOTE — UTILIZATION REVIEW
Notification of Discharge  This is a Notification of Discharge from our facility [address]  Please be advised that this patient has been discharge from our facility  Below you will find the admission and discharge date and time including the patients disposition  PRESENTATION DATE: 12/5/2019  4:23 PM    IP ADMISSION DATE: 12/5/19 1708   DISCHARGE DATE: 12/11/2019  4:36 PM  DISPOSITION: Home/Self Care Home/Self Care   Admission Orders listed below:  Admission Orders (From admission, onward)     Ordered        12/05/19 1708  Inpatient Admission  Once                   Please contact the UR Department if additional information is required to close this patient's authorization/case  Theron Samuel  [facility] Utilization Review Department  Main: [phone] x carefully listen to the prompts  All voicemails are confidential   [email or web address]  Send all requests for admission clinical reviews, approved or denied determinations and any other requests to dedicated fax number below belonging to the campus where the patient is receiving treatment   List of dedicated fax numbers:  [address] DENIALS (Administrative/Medical Necessity) [phone]   [address] (Maternity/NICU/Pediatrics) [phone]   [facility] [phone]   [facility] [phone]   [address] [place] [address] [phone]   Ilir Barry [facility] [phone]   [address] [place] [address] [phone]   Ottoniel Coffey [phone]   Florin Hutchison [address] [phone]

## 2019-12-13 ENCOUNTER — APPOINTMENT (OUTPATIENT)
Dept: LAB | Facility: MEDICAL CENTER | Age: 24
End: 2019-12-13
Payer: COMMERCIAL

## 2019-12-13 ENCOUNTER — TELEPHONE (OUTPATIENT)
Dept: HEMATOLOGY ONCOLOGY | Facility: CLINIC | Age: 24
End: 2019-12-13

## 2019-12-13 DIAGNOSIS — G08 THROMBOSIS OF SUPERIOR SAGITTAL SINUS: ICD-10-CM

## 2019-12-13 DIAGNOSIS — R31.9 HEMATURIA: ICD-10-CM

## 2019-12-13 LAB
BASOPHILS # BLD AUTO: 0.04 THOUSANDS/ΜL (ref 0–0.1)
BASOPHILS NFR BLD AUTO: 1 % (ref 0–1)
EOSINOPHIL # BLD AUTO: 0.08 THOUSAND/ΜL (ref 0–0.61)
EOSINOPHIL NFR BLD AUTO: 1 % (ref 0–6)
ERYTHROCYTE [DISTWIDTH] IN BLOOD BY AUTOMATED COUNT: 13 % (ref 11.6–15.1)
F5 GENE MUT ANL BLD/T: NORMAL
HCT VFR BLD AUTO: 42.2 % (ref 34.8–46.1)
HGB BLD-MCNC: 13 G/DL (ref 11.5–15.4)
IMM GRANULOCYTES # BLD AUTO: 0.03 THOUSAND/UL (ref 0–0.2)
IMM GRANULOCYTES NFR BLD AUTO: 1 % (ref 0–2)
INR PPP: 1.68 (ref 0.84–1.19)
LYMPHOCYTES # BLD AUTO: 1.31 THOUSANDS/ΜL (ref 0.6–4.47)
LYMPHOCYTES NFR BLD AUTO: 21 % (ref 14–44)
MCH RBC QN AUTO: 27.2 PG (ref 26.8–34.3)
MCHC RBC AUTO-ENTMCNC: 30.8 G/DL (ref 31.4–37.4)
MCV RBC AUTO: 88 FL (ref 82–98)
MONOCYTES # BLD AUTO: 0.21 THOUSAND/ΜL (ref 0.17–1.22)
MONOCYTES NFR BLD AUTO: 3 % (ref 4–12)
NEUTROPHILS # BLD AUTO: 4.67 THOUSANDS/ΜL (ref 1.85–7.62)
NEUTS SEG NFR BLD AUTO: 73 % (ref 43–75)
NRBC BLD AUTO-RTO: 0 /100 WBCS
PLATELET # BLD AUTO: 445 THOUSANDS/UL (ref 149–390)
PMV BLD AUTO: 10.3 FL (ref 8.9–12.7)
PROTHROMBIN TIME: 19.3 SECONDS (ref 11.6–14.5)
RBC # BLD AUTO: 4.78 MILLION/UL (ref 3.81–5.12)
WBC # BLD AUTO: 6.34 THOUSAND/UL (ref 4.31–10.16)

## 2019-12-13 PROCEDURE — 36415 COLL VENOUS BLD VENIPUNCTURE: CPT

## 2019-12-13 PROCEDURE — 85610 PROTHROMBIN TIME: CPT

## 2019-12-13 PROCEDURE — 85025 COMPLETE CBC W/AUTO DIFF WBC: CPT

## 2019-12-13 NOTE — TELEPHONE ENCOUNTER
New Patient Encounter    New Patient Intake Form   Patient Details:  Zhou Dixon  1995  [id number]    Background Information:  [address] starts by opening a telephone encounter and gathering the following information   Who is calling to schedule? If not self, relationship to patient? Self   Referring Provider Dr Erika Mittal   What is the diagnosis? Two blood clots on the brain   When was the diagnosis? 12/5/2019   Is patient aware of diagnosis? Yes   Reason for visit? Consult   Have you had any testing done? If so: when, where? No   Are records in EPIC? no   Was the patient told to bring a disk? no   Scheduling Information:   Preferred [place]:  [place]     Requesting Specific Provider? Dr Armandina Scheuermann   Are there any dates/time the patient cannot be seen? N/A      Miscellaneous: Pt would like an appointment anytime after    After completing the above information, please route to Financial Counselor and the appropriate Nurse Navigator for review

## 2019-12-16 ENCOUNTER — TELEPHONE (OUTPATIENT)
Dept: HEMATOLOGY ONCOLOGY | Facility: CLINIC | Age: 24
End: 2019-12-16

## 2019-12-16 NOTE — TELEPHONE ENCOUNTER
New Patient Encounter    New Patient Intake Form   Patient Details:  Joana Nageotte  1995  [id number]    Background Information:  [address] starts by opening a telephone encounter and gathering the following information   Who is calling to schedule? If not self, relationship to patient? self   Referring Provider hosptial discharge   What is the diagnosis? 2 blood clots in brain   When was the diagnosis? 12/11   Is patient aware of diagnosis? Yes   Reason for visit? Consult    Have you had any testing done? If so: when, where? Yes  CT scan, MRI, bloodwork   Are records in [facility]? yes   Was the patient told to bring a disk? no   Scheduling Information:   Preferred [place]:  Any     Requesting Specific Provider? Dr Matias Pel   Are there any dates/time the patient cannot be seen? n/a      Miscellaneous: after 2pm   After completing the above information, please route to Financial Counselor and the appropriate Nurse Navigator for review

## 2019-12-18 ENCOUNTER — TELEPHONE (OUTPATIENT)
Dept: OBGYN CLINIC | Facility: CLINIC | Age: 24
End: 2019-12-18

## 2019-12-18 NOTE — TELEPHONE ENCOUNTER
Pt had c section 11/29  Had thrombosis 12/11 - re hospitalized  Is on lovenox - ending today - and will remain on warfarin  Seeing hemo 1/16 and neuro 2/13  Needs pp appt with md in [place]  I need advice on whom she should see - you delivered her and I know you are not in [facility] in Dec    She said CG told her she should have iud - but I feel this should wait - but not sure  Need advice   Thank you

## 2019-12-18 NOTE — TELEPHONE ENCOUNTER
Eran Jacques, can you please give Sandi Wiklinson an appt 12/26 as that is the only day he is in next week  He does have appts available and pt said she is able to go to [facility] on 12/26  Thank you

## 2019-12-18 NOTE — TELEPHONE ENCOUNTER
I'd have Dot Draft see her next week for a check up if possible, and then see me in January for a postpartum     Thanks

## 2019-12-18 NOTE — TELEPHONE ENCOUNTER
----- Message from [facility] [facility]  Annetta sent at 12/17/2019  7:28 PM EST -----  Regarding: Pre-Op/Post-Op Question  Contact: [phone]  Hi, I think I was supposed to have an appointment with you but you were out    I am interested in the IUD, with the blood thinners Dr Constance Palomino said would be my safest bet  Please let me know when I can have my checkup with you! Thank you so much!

## 2019-12-23 ENCOUNTER — APPOINTMENT (OUTPATIENT)
Dept: LAB | Facility: MEDICAL CENTER | Age: 24
End: 2019-12-23
Payer: COMMERCIAL

## 2019-12-23 ENCOUNTER — TRANSCRIBE ORDERS (OUTPATIENT)
Dept: ADMINISTRATIVE | Facility: HOSPITAL | Age: 24
End: 2019-12-23

## 2019-12-23 DIAGNOSIS — I67.6 NONPYOGENIC THROMBOSIS OF INTRACRANIAL VENOUS SINUS: ICD-10-CM

## 2019-12-23 DIAGNOSIS — I67.6 NONPYOGENIC THROMBOSIS OF INTRACRANIAL VENOUS SINUS: Primary | ICD-10-CM

## 2019-12-23 LAB
INR PPP: 1.23 (ref 0.84–1.19)
PROTHROMBIN TIME: 15.1 SECONDS (ref 11.6–14.5)

## 2019-12-23 PROCEDURE — 85610 PROTHROMBIN TIME: CPT

## 2019-12-23 PROCEDURE — 36415 COLL VENOUS BLD VENIPUNCTURE: CPT

## 2019-12-26 ENCOUNTER — POSTPARTUM VISIT (OUTPATIENT)
Dept: OBGYN CLINIC | Facility: MEDICAL CENTER | Age: 24
End: 2019-12-26

## 2019-12-26 VITALS
RESPIRATION RATE: 14 BRPM | DIASTOLIC BLOOD PRESSURE: 90 MMHG | HEIGHT: 65 IN | BODY MASS INDEX: 36.19 KG/M2 | WEIGHT: 217.2 LBS | SYSTOLIC BLOOD PRESSURE: 140 MMHG

## 2019-12-26 DIAGNOSIS — Z98.890 POST-OPERATIVE STATE: Primary | ICD-10-CM

## 2019-12-26 DIAGNOSIS — R56.9 SEIZURE (HCC): ICD-10-CM

## 2019-12-26 DIAGNOSIS — G08 THROMBOSIS OF SUPERIOR SAGITTAL SINUS: ICD-10-CM

## 2019-12-26 PROCEDURE — 99024 POSTOP FOLLOW-UP VISIT: CPT | Performed by: OBSTETRICS & GYNECOLOGY

## 2019-12-26 RX ORDER — LEVETIRACETAM 750 MG/1
750 TABLET ORAL EVERY 12 HOURS SCHEDULED
Qty: 90 TABLET | Refills: 0 | Status: SHIPPED | OUTPATIENT
Start: 2019-12-26 | End: 2020-03-26 | Stop reason: SDUPTHER

## 2019-12-26 NOTE — PROGRESS NOTES
Assessment/Plan:      Diagnoses and all orders for this visit:    Post-operative state    Postpartum state    Thrombosis of superior sagittal sinus  -     levETIRAcetam (KEPPRA) 750 mg tablet; Take 1 tablet (750 mg total) by mouth every 12 (twelve) hours    Seizure (HCC)  -     levETIRAcetam (KEPPRA) 750 mg tablet; Take 1 tablet (750 mg total) by mouth every 12 (twelve) hours        We discussed various forms of birth control  IUD recommended  Progesterone IUD versus copper IUD was discussed  Patient has an increased risk of menorrhagia on blood thinners and may benefit from a progesterone IUD, however, patient has a superior sagittal sinus thrombosis and Hematology may be discourage the use of progesterone  We will ask Hematology to weigh in on the use of progesterone IUD  Patient has an appointment the end of January with Kevon Zaragoza, hopefully,  we can put the proper IUD in her at that visit  Subjective:     Patient ID: Dede Lion is a 21 y o  female  Patient is a 50-year-old female, para 2, status post low-transverse  section  Postoperative course was complicated by a CVT  Patient states that she is doing extremely well and has no residual symptoms or deficits  Patient is currently on Coumadin and Keppra  Patient needs a refill on her Keppra she is about to run out  We discussed Mirena IUD versus copper IUD  Patient will discuss with Hematology  Review of systems is negative for feeling depressed or overwhelmed  Patient is bottle feeding  Review of systems is negative for headaches heavy vaginal bleeding or fevers  Review of Systems   Constitutional: Negative for fever  Gastrointestinal: Negative for abdominal pain  Genitourinary: Negative for pelvic pain, vaginal bleeding and vaginal pain  Psychiatric/Behavioral: Negative for dysphoric mood  Objective:     Physical Exam   Constitutional: She appears well-developed and well-nourished  Pulmonary/Chest: Effort normal    Abdominal: Soft  She exhibits no distension and no mass  There is no tenderness  There is no rebound and no guarding  No hernia  Incision is healing well  Skin: Skin is dry  Psychiatric: She has a normal mood and affect   Her behavior is normal

## 2019-12-27 ENCOUNTER — TRANSCRIBE ORDERS (OUTPATIENT)
Dept: ADMINISTRATIVE | Facility: HOSPITAL | Age: 24
End: 2019-12-27

## 2019-12-27 ENCOUNTER — APPOINTMENT (OUTPATIENT)
Dept: LAB | Facility: MEDICAL CENTER | Age: 24
End: 2019-12-27
Payer: COMMERCIAL

## 2019-12-27 DIAGNOSIS — I67.6 NONPYOGENIC THROMBOSIS OF INTRACRANIAL VENOUS SINUS: Primary | ICD-10-CM

## 2019-12-27 LAB
INR PPP: 1.62 (ref 0.84–1.19)
PROTHROMBIN TIME: 18.8 SECONDS (ref 11.6–14.5)

## 2019-12-27 PROCEDURE — 36415 COLL VENOUS BLD VENIPUNCTURE: CPT | Performed by: FAMILY MEDICINE

## 2019-12-27 PROCEDURE — 85610 PROTHROMBIN TIME: CPT | Performed by: FAMILY MEDICINE

## 2020-01-02 ENCOUNTER — TRANSCRIBE ORDERS (OUTPATIENT)
Dept: ADMINISTRATIVE | Facility: HOSPITAL | Age: 25
End: 2020-01-02

## 2020-01-02 ENCOUNTER — APPOINTMENT (OUTPATIENT)
Dept: LAB | Facility: MEDICAL CENTER | Age: 25
End: 2020-01-02
Payer: COMMERCIAL

## 2020-01-02 DIAGNOSIS — I67.6 NONPYOGENIC THROMBOSIS OF INTRACRANIAL VENOUS SINUS: ICD-10-CM

## 2020-01-02 DIAGNOSIS — I67.6 NONPYOGENIC THROMBOSIS OF INTRACRANIAL VENOUS SINUS: Primary | ICD-10-CM

## 2020-01-02 DIAGNOSIS — Z3A.33 33 WEEKS GESTATION OF PREGNANCY: ICD-10-CM

## 2020-01-02 LAB
INR PPP: 2.51 (ref 0.84–1.19)
PROTHROMBIN TIME: 26.6 SECONDS (ref 11.6–14.5)

## 2020-01-02 PROCEDURE — 36415 COLL VENOUS BLD VENIPUNCTURE: CPT

## 2020-01-02 PROCEDURE — 85610 PROTHROMBIN TIME: CPT

## 2020-01-09 ENCOUNTER — TRANSCRIBE ORDERS (OUTPATIENT)
Dept: ADMINISTRATIVE | Facility: HOSPITAL | Age: 25
End: 2020-01-09

## 2020-01-09 ENCOUNTER — APPOINTMENT (OUTPATIENT)
Dept: LAB | Facility: MEDICAL CENTER | Age: 25
End: 2020-01-09
Payer: COMMERCIAL

## 2020-01-09 DIAGNOSIS — I67.6 NONPYOGENIC THROMBOSIS OF INTRACRANIAL VENOUS SINUS: Primary | ICD-10-CM

## 2020-01-09 DIAGNOSIS — I67.6 NONPYOGENIC THROMBOSIS OF INTRACRANIAL VENOUS SINUS: ICD-10-CM

## 2020-01-09 LAB
INR PPP: 2.68 (ref 0.84–1.19)
PROTHROMBIN TIME: 28 SECONDS (ref 11.6–14.5)

## 2020-01-09 PROCEDURE — 36415 COLL VENOUS BLD VENIPUNCTURE: CPT

## 2020-01-09 PROCEDURE — 85610 PROTHROMBIN TIME: CPT

## 2020-01-16 ENCOUNTER — CONSULT (OUTPATIENT)
Dept: HEMATOLOGY ONCOLOGY | Facility: CLINIC | Age: 25
End: 2020-01-16
Payer: COMMERCIAL

## 2020-01-16 ENCOUNTER — LAB (OUTPATIENT)
Dept: LAB | Facility: MEDICAL CENTER | Age: 25
End: 2020-01-16
Payer: COMMERCIAL

## 2020-01-16 ENCOUNTER — APPOINTMENT (OUTPATIENT)
Dept: LAB | Facility: CLINIC | Age: 25
End: 2020-01-16
Payer: COMMERCIAL

## 2020-01-16 VITALS
TEMPERATURE: 98.1 F | SYSTOLIC BLOOD PRESSURE: 118 MMHG | HEART RATE: 80 BPM | WEIGHT: 228 LBS | HEIGHT: 65 IN | DIASTOLIC BLOOD PRESSURE: 82 MMHG | BODY MASS INDEX: 37.99 KG/M2 | OXYGEN SATURATION: 98 % | RESPIRATION RATE: 16 BRPM

## 2020-01-16 DIAGNOSIS — G08 THROMBOSIS OF SUPERIOR SAGITTAL SINUS: ICD-10-CM

## 2020-01-16 DIAGNOSIS — D59.5 PNH (PAROXYSMAL NOCTURNAL HEMOGLOBINURIA) (HCC): Primary | ICD-10-CM

## 2020-01-16 DIAGNOSIS — D59.5 PNH (PAROXYSMAL NOCTURNAL HEMOGLOBINURIA) (HCC): ICD-10-CM

## 2020-01-16 LAB — HCYS SERPL-SCNC: 5 UMOL/L (ref 3.7–11.2)

## 2020-01-16 PROCEDURE — 81240 F2 GENE: CPT

## 2020-01-16 PROCEDURE — 83090 ASSAY OF HOMOCYSTEINE: CPT

## 2020-01-16 PROCEDURE — 81241 F5 GENE: CPT

## 2020-01-16 PROCEDURE — 86147 CARDIOLIPIN ANTIBODY EA IG: CPT

## 2020-01-16 PROCEDURE — 81291 MTHFR GENE: CPT

## 2020-01-16 PROCEDURE — 88185 FLOWCYTOMETRY/TC ADD-ON: CPT

## 2020-01-16 PROCEDURE — 88184 FLOWCYTOMETRY/ TC 1 MARKER: CPT

## 2020-01-16 PROCEDURE — 99245 OFF/OP CONSLTJ NEW/EST HI 55: CPT | Performed by: INTERNAL MEDICINE

## 2020-01-16 PROCEDURE — 88187 FLOWCYTOMETRY/READ 2-8: CPT

## 2020-01-16 NOTE — PROGRESS NOTES
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 who then went on to have sagittal sinus thrombosis one week post  section  She was started on Coumadin which she is currently on  This is being managed by her primary care physician and she is seeing our colleagues in neurology who per the notes are anticipating 3-6 months of anticoagulation  She was referred to see us for consideration of a hypercoagulable workup  Again the superior sagittal sinus thrombosis is rare so PNH needs to be evaluated for and we will arrange for this  She did have multiple risk factors going on so I would definitely consider this provoked  She was status post a pregnancy and a week  According to her from a   She also had been receiving IVIG all of which can increase her risk of thrombosis  She is now doing well  She denies any complaints  Denies any nausea denies vomiting denies any diarrhea  The rest of her 14 point review of systems today was negative  Test Results:    Imaging: No results found  Labs:   Lab Results   Component Value Date    WBC 6 34 2019    HGB 13 0 2019    HCT 42 2 2019    MCV 88 2019     (H) 2019     Lab Results   Component Value Date    K 3 9 2019     2019    CO2 27 2019    BUN 14 2019    CREATININE 0 67 2019    CALCIUM 8 9 2019    AST 9 2019    ALT 19 2019    ALKPHOS 115 2019    EGFR 123 2019     ROS: As stated in history of present illness otherwise her 14 point review of systems today was negative      Active Problems:   Patient Active Problem List   Diagnosis    At high risk for  complications    Tobacco use    Maternal obesity syndrome in third trimester    Alloimmune thrombocytopenia during pregnancy, antepartum    Supervision of high-risk pregnancy, third trimester    37 weeks gestation of pregnancy    Maternal morbid obesity in third trimester, antepartum ([address] )    Status post  delivery    Thrombosis of superior sagittal sinus    Seizure ([address] )    Vaginal bleeding       Past Medical History:   Past Medical History:   Diagnosis Date     alloimmune thrombocytopenia     Obesity complicating pregnancy in second trimester 2019    Varicella     childhood       Surgical History:   Past Surgical History:   Procedure Laterality Date     SECTION      CHOLECYSTECTOMY          CHOLECYSTECTOMY LAPAROSCOPIC N/A 2017    Procedure: CHOLECYSTECTOMY LAPAROSCOPIC;  Surgeon: Mimi Hannah MD;  Location: [facility];  Service: General    MN  DELIVERY ONLY N/A 2019    Procedure:  SECTION () REPEAT;  Surgeon: Leatha Hurd MD;  Location[facility];  Service: Obstetrics       Family History:    Family History   Problem Relation Age of Onset    Arthritis Mother     Kidney disease Mother     Other Mother         thyroid, thalassemia, anemia    Diabetes Mother         type 2 insulin    Cancer Mother         lung, gist    Hypertension Father     No Known Problems Brother     Cancer Maternal Grandmother         lung    No Known Problems Son     Stroke Maternal Grandfather     Heart disease Maternal Grandfather         MI    No Known Problems Paternal Grandmother     Cancer Paternal Grandfather         brain    Diabetes Maternal Uncle         type 2 insulin       Cancer-related family history includes Cancer in her maternal grandmother, mother, and paternal grandfather      Social History:   Social History     Socioeconomic History    Marital status:      Spouse name: Manjula Wiseman    Number of children: 3    Years of education: Not on file    Highest education level: Not on file   Occupational History    Occupation:      Comment: [employer]   Social Needs    Financial resource strain: Not on file    Food insecurity:     Worry: Not on file     Inability: Not on file   [place] needs:     Medical: Not on file     Non-medical: Not on file   Tobacco Use    Smoking status: Former Smoker     Packs/day: 0 50     Types: Cigarettes     Last attempt to quit: 2019     Years since quittin 1    Smokeless tobacco: Never Used    Tobacco comment: 1-2   Substance and Sexual Activity    Alcohol use: Not Currently     Frequency: Never    Drug use: No    Sexual activity: Yes     Partners: Male   Lifestyle    Physical activity:     Days per week: Not on file     Minutes per session: Not on file    Stress: Not on file   Relationships    Social connections:     Talks on phone: Not on file     Gets together: Not on file     Attends Buddhist service: Not on file     Active member of club or organization: Not on file     Attends meetings of clubs or organizations: Not on file     Relationship status: Not on file    Intimate partner violence:     Fear of current or ex partner: Not on file     Emotionally abused: Not on file     Physically abused: Not on file     Forced sexual activity: Not on file   Other Topics Concern    Not on file   Social History Narrative    Always uses seat belts       Current Medications:   Current Outpatient Medications   Medication Sig Dispense Refill    levETIRAcetam (KEPPRA) 750 mg tablet Take 1 tablet (750 mg total) by mouth every 12 (twelve) hours 90 tablet 0    warfarin (COUMADIN) 5 mg tablet Take 1 tablet (5 mg total) by mouth daily (Patient taking differently: Take 10 mg by mouth daily ) 15 tablet 0    acetaminophen (TYLENOL) 325 mg tablet Take 2 tablets (650 mg total) by mouth every 4 (four) hours as needed for mild pain (Patient not taking: Reported on 1/16/2020) 30 tablet 0    cyclobenzaprine (FLEXERIL) 10 mg tablet Take 1 tablet (10 mg total) by mouth 3 (three) times a day as needed for muscle spasms (Patient not taking: Reported on 1/16/2020) 9 tablet 1    enoxaparin (LOVENOX) 80 mg/0 8 mL Inject 1 1 mL (110 mg total) under the skin every 12 (twelve) hours for 7 days 15 mL 0    predniSONE 10 mg tablet PO  3 tabs daily x 7 days, 2 tabs daily x 7 days, 1 tab daily x 7 days and then stop (Patient not taking: Reported on 1/16/2020) 42 tablet 0     No current facility-administered medications for this visit  Allergies: Allergies   Allergen Reactions    Bee Venom Anaphylaxis     Is suppose to carry EpiPen    Tobramycin      Pt states she is not sure if she is allergic to this          Physical Exam:    Body surface area is 2 09 meters squared  Wt Readings from Last 3 Encounters:   01/16/20 103 kg (228 lb)   12/26/19 98 5 kg (217 lb 3 2 oz)   12/05/19 106 kg (233 lb 0 4 oz)        Temp Readings from Last 3 Encounters:   01/16/20 98 1 °F (36 7 °C) (Oral)   12/11/19 98 4 °F (36 9 °C)   12/01/19 98 2 °F (36 8 °C) (Oral)        BP Readings from Last 3 Encounters:   01/16/20 118/82   12/26/19 140/90   12/11/19 116/70         Pulse Readings from Last 3 Encounters:   01/16/20 80   12/09/19 85   12/01/19 74      Physical Exam     Constitutional   General appearance: No acute distress, well appearing and well nourished  Eyes   Conjunctiva and lids: No swelling, erythema or discharge  Pupils and irises: Equal, round and reactive to light  Ears, Nose, Mouth, and Throat   External inspection of ears and nose: Normal     Nasal mucosa, septum, and turbinates: Normal without edema or erythema  Oropharynx: Normal with no erythema, edema, exudate or lesions  Pulmonary   Respiratory effort: No increased work of breathing or signs of respiratory distress      Auscultation of lungs: Clear to auscultation  Cardiovascular   Palpation of heart: Normal PMI, no thrills  Auscultation of heart: Normal rate and rhythm, normal S1 and S2, without murmurs  Examination of extremities for edema and/or varicosities: Normal     Carotid pulses: Normal     Abdomen   Abdomen: Non-tender, no masses  Liver and spleen: No hepatomegaly or splenomegaly  Lymphatic   Palpation of lymph nodes in neck: No lymphadenopathy  Musculoskeletal   Gait and station: Normal     Digits and nails: Normal without clubbing or cyanosis  Inspection/palpation of joints, bones, and muscles: Normal     Skin   Skin and subcutaneous tissue: Normal without rashes or lesions  Neurologic   Cranial nerves: Cranial nerves 2-12 intact  Sensation: No sensory loss  Psychiatric   Orientation to person, place, and time: Normal     Mood and affect: Normal           Assessment/ Plan:      The patient is a pleasant 79-year-old female with 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 who then went on to have sagittal sinus thrombosis one week post  section  She was started on Coumadin which she is currently on  This is being managed by her primary care physician and she is seeing our colleagues in neurology who per the notes are anticipating 3-6 months of anticoagulation  She does have imaging scheduled with them within the next month  Being on Coumadin a full thrombosis panel cannot be ordered but I will do the factor V Leiden testing along with the genetic test that can be done while on Coumadin  I will also check for PNH because of the atypical location of her blood clot  If the testing is all negative and her clot resolves the neurologist will be the final decision makers as to duration of anticoagulation but I think if everything in the workup is -6 months should be adequate as stated in the chart but the final decision will be made by them   As far as her IUD is concerned I explained there is a theoretical risk with any hormone including progesterone although the risk would be very small considering it would be with an IUD and not daily systemic therapy but if she wishes to have no risk than she should consider an IUD without any associated progesterone  She does understand the small risk with the progesterone which is mostly theoretical and will discuss this with her GYN  For now I will order the hypercoagulable testing and have her come back to see me within 4-6 weeks  She will see our colleagues in neurology prior to that  They should have the results of the testing I'm ordering when she sees them  They will then determine the duration of anticoagulation based on imaging and testing  Of course if the testing does show disorder she may consider lifelong anticoagulation also and I they're assessment in this regard  IVIG does slightly increase risk of thromboses also so considering the patient's multiple risk factors at the time of the clot and this could all have been related to the pregnancy combined with IVIG and the fact that she was postoperative and hypercoagulable from her   The testing being ordered should clarify her genetic risk and other risk factors somewhat  She will see me back with the results  Goals and Barriers:  Current Goal:  Prolong Survival from Cancer  Barriers: None  Patient's Capacity to Self Care:  Patient  able to self care  Portions of the record may have been created with voice recognition software   Occasional wrong word or "sound a like" substitutions may have occurred due to the inherent limitations of voice recognition software   Read the chart carefully and recognize, using context, where substitutions have occurred

## 2020-01-17 LAB
CARDIOLIPIN IGA SER IA-ACNC: <9 APL U/ML (ref 0–11)
CARDIOLIPIN IGG SER IA-ACNC: 26 GPL U/ML (ref 0–14)
CARDIOLIPIN IGM SER IA-ACNC: 17 MPL U/ML (ref 0–12)

## 2020-01-20 LAB — SCAN RESULT: NORMAL

## 2020-01-21 LAB
F2 GENE MUT ANL BLD/T: ABNORMAL
F5 GENE MUT ANL BLD/T: NORMAL

## 2020-01-23 ENCOUNTER — OFFICE VISIT (OUTPATIENT)
Dept: NEUROLOGY | Facility: CLINIC | Age: 25
End: 2020-01-23
Payer: COMMERCIAL

## 2020-01-23 ENCOUNTER — TELEPHONE (OUTPATIENT)
Dept: NEUROLOGY | Facility: CLINIC | Age: 25
End: 2020-01-23

## 2020-01-23 VITALS
WEIGHT: 228 LBS | DIASTOLIC BLOOD PRESSURE: 72 MMHG | BODY MASS INDEX: 37.99 KG/M2 | SYSTOLIC BLOOD PRESSURE: 128 MMHG | HEART RATE: 56 BPM | HEIGHT: 65 IN

## 2020-01-23 DIAGNOSIS — G08 THROMBOSIS OF SUPERIOR SAGITTAL SINUS: ICD-10-CM

## 2020-01-23 DIAGNOSIS — R56.9 SEIZURE (HCC): Primary | ICD-10-CM

## 2020-01-23 LAB — MTHFR GENE MUT ANL BLD/T: NORMAL

## 2020-01-23 PROCEDURE — 99215 OFFICE O/P EST HI 40 MIN: CPT | Performed by: PHYSICIAN ASSISTANT

## 2020-01-23 NOTE — ASSESSMENT & PLAN NOTE
· Pt will continue Keppra 750mg 2x daily  · She was advised that she is unable to drive until she is seizure free for 6 months  It was explained that this is a law in the state of [place]  · She did drive to this appt  Her boyfriend will be picking her up from the office  · Cecilia Garcia will meet or call the patient with available resources while she is unable to drive  · An EEG will be ordered as well  · Pt did ask if there was any way she could receive some type of income as she is unable to drive to work which social work will help with as well  The patient does not think she is eligible for short term disability through her place of employment

## 2020-01-23 NOTE — PATIENT INSTRUCTIONS
Thrombosis of superior sagittal sinus  · Continue Coumadin for 6 months unless otherwise indicated by current work-up ordered by Hematology  · Await hypercoagulable panel  · MRV to be repeated  Will be ordered at her next appt  I have spent 45 minutes with Patient  today in which greater than 50% of this time was spent in counseling/coordination of care regarding Diagnostic results, Prognosis, Risks and benefits of tx options, Intructions for management, Patient and family education, Importance of tx compliance, Risk factor reductions and Impressions  Pt will follow-up in 2 months  Seizure ([address] )  · Pt will continue Keppra 750mg 2x daily  · She was advised that she is unable to drive until she is seizure free for 6 months  It was explained that this is a law in the state [place]  · She did drive to this appt  Her boyfriend will be picking her up from the office  · Dee Dee Parra will meet or call the patient with available resources while she is unable to drive  · An EEG will be ordered as well  · Pt did ask if there was any way she could receive some type of income as she is unable to drive to work which social work will help with as well  The patient does not think she is eligible for short term disability through her place of employment

## 2020-01-23 NOTE — TELEPHONE ENCOUNTER
MSW met patient in person to discuss options as patient is not permitted to drive  Patient shared that she is interested in earning income while staying at home  Patient shared that her boyfriend can provide transportation and he is out of work at [employer]  Patient also asked about social security  MSW explained that Social security disability application could be completed however it takes at least 6 months for them to review or more  They will request medical documentation from providers  Another option provided is to find a job during the hours that boyfriend is at home for instance 5pm to 10pm  Patient shared that boyfriend does not want her to work because she is a full time student  MSW also added that she could search online for jobs that she can do while at home if interested in income  Patient asked if she is eligible for food stamps  MSW shared that the office of human services evaluate the household income  MSW provided to patient the [facility] application in where the patient can apply for food stamps, medical assistance and cash assistance  MSW contact information was provided to patient for any questions or future social needs

## 2020-01-23 NOTE — PROGRESS NOTES
Patient ID: Ayaan Garcia is a 21 y o  female  Assessment/Plan: Thrombosis of superior sagittal sinus  · Continue Coumadin for 6 months unless otherwise indicated by current work-up ordered by Hematology  · Await hypercoagulable panel  · MRV to be repeated  Will be ordered at her next appt  I have spent 45 minutes with Patient  today in which greater than 50% of this time was spent in counseling/coordination of care regarding Diagnostic results, Prognosis, Risks and benefits of tx options, Intructions for management, Patient and family education, Importance of tx compliance, Risk factor reductions and Impressions  Pt will follow-up in 2 months  [address]izure ([facility] [address]  · Pt will continue Keppra 750mg 2x daily  · She was advised that she is unable to drive until she is seizure free for 6 months  It was explained that this is a law in the state [place]  · She did drive to this appt  Her boyfriend will be picking her up from the office  · Yodit Her will meet or call the patient with available resources while she is unable to drive  · An EEG will be ordered as well  · Pt did ask if there was any way she could receive some type of income as she is unable to drive to work which social work will help with as well  The patient does not think she is eligible for short term disability through her place of employment  Problem List Items Addressed This Visit        Cardiovascular and Mediastinum    Thrombosis of superior sagittal sinus     · Continue Coumadin for 6 months unless otherwise indicated by current work-up ordered by Hematology  · Await hypercoagulable panel  · MRV to be repeated  Will be ordered at her next appt    I have spent 45 minutes with Patient  today in which greater than 50% of this time was spent in counseling/coordination of care regarding Diagnostic results, Prognosis, Risks and benefits of tx options, Intructions for management, Patient and family education, Importance of tx compliance, Risk factor reductions and Impressions  Pt will follow-up in 2 months  Other    Seizure ([address] ) - Primary     · Pt will continue Keppra 750mg 2x daily  · She was advised that she is unable to drive until she is seizure free for 6 months  It was explained that this is a law in the state [place]  · She did drive to this appt  Her boyfriend will be picking her up from the office  · Cecilia Garcia will meet or call the patient with available resources while she is unable to drive  · An EEG will be ordered as well  · Pt did ask if there was any way she could receive some type of income as she is unable to drive to work which social work will help with as well  The patient does not think she is eligible for short term disability through her place of employment  Relevant Orders    EEG Sleep deprived             Subjective:    HPI    Kranthi Hawthorne is a 21 y o  female with  alloimmune thrombocytopenia s/p  who presented to [facility] ED after witnessed grand mal seizure 19  She had no history gestational diabetes, no history of preeclampsia, no history of hypertension  On the day of presentation, she was normal state of health, holding her child, around 11:00 a m  she began experiencing left arm numbness, at 3:00 p m  began to have jerking in her neck, she was able to put her child down she laid to the ground and she was noted to be convulsing  It was reported the patient postictal afterwards, she did have a tongue bite  It is reported from documentation that the patient has been treated for headaches since discharge initially thought to be secondary to post epidural puncture  In the ED -   The patient was given 20 mg of labetalol for hypertensive emergency, along with Ativan in the ED, along with magnesium       Patient was assessed by critical care, CTA head and neck with and with out contrast - showed a superior sagittal sinus thrombosis  She was admitted to the medical ICU, she is being treated for witnessed grand mal seizure, superior sagittal sinus thrombosis - patient was started on heparin drip  She was started on Keppra as well  MRV demonstrated a stable thrombus in the mid and posterior aspects of the superior sagittal sinus, thrombosis the bilateral cortical veins posterior parietal convexity  MRI of the brain was completed which did review demonstrated thrombus in the superior sagittal sinus, bilateral cortical veins along the posterior aspect the proximal convexities are also thrombosed with significant venous congestion with small draining veins along the bilateral parietal convexities greater on the right  Minimal restricted diffusion in the perirolandic region bilaterally, though more prominently right, additionally with mild dural swelling in T2 hyperdensity in the right central and paracentral gyri, findings suggestive of vasogenic edema and ischemia with out clear evidence of infarct secondary to venous sinus and cortical vein thrombosis, no evidence of hemorrhage  Focal mild gryal swelling in the right parietal region  She reports that she is doing well  She is taking Coumadin and is tolerating it without excessive bruising, epistaxis or blood in her stools  She is following with Hematology and is undergoing hypercoagulable work-up  She is taking Keppra 750mg every 12 hours  She is tolerating the medication without side effects  She has not had any seizures since 12/5/19  She denies any bowel or bladder incontinence or unexplained tongue bites  Case discussed with Dr Antonio Duran  Pt should not be driving at this time and needs to be seizure free for 6 months  Reporting form completed and sent to [facility] [employer]      The following portions of the patient's history were reviewed and updated as appropriate: allergies, current medications, past family history, past medical history, past social history, past surgical history and problem list          Objective:    Blood pressure 128/72, pulse 56, height 5' 5" (1 651 m), weight 103 kg (228 lb), last menstrual period 03/09/2019, currently breastfeeding  Physical Exam   Eyes: Pupils are equal, round, and reactive to light  Lids are normal    Neurological: She has normal strength and normal reflexes  Coordination normal    Psychiatric: Her speech is normal    Vitals reviewed  Neurological Exam  Mental Status   Oriented to person, place, time and situation  Recent and remote memory are intact  Speech is normal  Language is fluent with no aphasia  Attention and concentration are normal  Fund of knowledge is appropriate for level of education  Apraxia absent  Cranial Nerves  CN II: Visual acuity is normal  Visual fields full to confrontation  CN III, IV, VI: Extraocular movements intact bilaterally  Normal lids and orbits bilaterally  Pupils equal round and reactive to light bilaterally  CN V: Facial sensation is normal   CN VII: Full and symmetric facial movement  CN VIII: Hearing is normal   CN IX, X: Palate elevates symmetrically  Normal gag reflex  CN XI: Shoulder shrug strength is normal   CN XII: Tongue midline without atrophy or fasciculations  Motor   Strength is 5/5 throughout all four extremities  Sensory  Sensation is intact to light touch, pinprick, vibration and proprioception in all four extremities  Reflexes  Deep tendon reflexes are 2+ and symmetric in all four extremities with downgoing toes bilaterally  Coordination  Finger-to-nose, rapid alternating movements and heel-to-shin normal bilaterally without dysmetria  Gait  Normal casual, toe, heel and tandem gait  ROS:    Review of Systems   Constitutional: Negative  Negative for appetite change and fever  HENT: Negative  Negative for hearing loss, tinnitus, trouble swallowing and voice change  Eyes: Negative  Negative for photophobia and pain  Respiratory: Negative  Negative for shortness of breath  Cardiovascular: Negative  Negative for palpitations  Gastrointestinal: Negative  Negative for nausea and vomiting  Endocrine: Negative  Negative for cold intolerance and heat intolerance  Genitourinary: Negative  Negative for dysuria, frequency and urgency  Musculoskeletal: Negative  Negative for myalgias and neck pain  Skin: Negative  Negative for rash  Allergic/Immunologic: Negative  Neurological: Negative  Negative for dizziness, tremors, seizures, syncope, facial asymmetry, speech difficulty, weakness, light-headedness, numbness and headaches  Hematological: Negative  Does not bruise/bleed easily  Psychiatric/Behavioral: Negative  Negative for confusion, hallucinations and sleep disturbance  Review of systems personally reviewed

## 2020-01-24 ENCOUNTER — TRANSCRIBE ORDERS (OUTPATIENT)
Dept: LAB | Facility: HOSPITAL | Age: 25
End: 2020-01-24

## 2020-01-24 DIAGNOSIS — G08 THROMBOSIS OF SUPERIOR SAGITTAL SINUS: Primary | ICD-10-CM

## 2020-01-24 DIAGNOSIS — D59.5 PNH (PAROXYSMAL NOCTURNAL HEMOGLOBINURIA) (HCC): ICD-10-CM

## 2020-01-30 ENCOUNTER — APPOINTMENT (OUTPATIENT)
Dept: LAB | Facility: CLINIC | Age: 25
End: 2020-01-30
Payer: COMMERCIAL

## 2020-01-30 DIAGNOSIS — G08 THROMBOSIS OF SUPERIOR SAGITTAL SINUS: ICD-10-CM

## 2020-01-30 DIAGNOSIS — D59.5 PNH (PAROXYSMAL NOCTURNAL HEMOGLOBINURIA) (HCC): ICD-10-CM

## 2020-01-30 PROCEDURE — 88185 FLOWCYTOMETRY/TC ADD-ON: CPT

## 2020-01-30 PROCEDURE — 88184 FLOWCYTOMETRY/ TC 1 MARKER: CPT

## 2020-02-03 ENCOUNTER — PROCEDURE VISIT (OUTPATIENT)
Dept: OBGYN CLINIC | Facility: MEDICAL CENTER | Age: 25
End: 2020-02-03
Payer: COMMERCIAL

## 2020-02-03 VITALS — WEIGHT: 227 LBS | BODY MASS INDEX: 37.77 KG/M2 | DIASTOLIC BLOOD PRESSURE: 76 MMHG | SYSTOLIC BLOOD PRESSURE: 120 MMHG

## 2020-02-03 DIAGNOSIS — N91.2 AMENORRHEA: ICD-10-CM

## 2020-02-03 DIAGNOSIS — Z30.430 ENCOUNTER FOR IUD INSERTION: Primary | ICD-10-CM

## 2020-02-03 PROBLEM — O99.213 MATERNAL MORBID OBESITY IN THIRD TRIMESTER, ANTEPARTUM (HCC): Status: RESOLVED | Noted: 2019-11-15 | Resolved: 2020-02-03

## 2020-02-03 PROBLEM — O09.93 SUPERVISION OF HIGH-RISK PREGNANCY, THIRD TRIMESTER: Status: RESOLVED | Noted: 2019-10-29 | Resolved: 2020-02-03

## 2020-02-03 PROBLEM — O99.213 MATERNAL OBESITY SYNDROME IN THIRD TRIMESTER: Status: RESOLVED | Noted: 2019-08-04 | Resolved: 2020-02-03

## 2020-02-03 PROBLEM — Z3A.37 37 WEEKS GESTATION OF PREGNANCY: Status: RESOLVED | Noted: 2019-11-07 | Resolved: 2020-02-03

## 2020-02-03 PROBLEM — E66.01 MATERNAL MORBID OBESITY IN THIRD TRIMESTER, ANTEPARTUM (HCC): Status: RESOLVED | Noted: 2019-11-15 | Resolved: 2020-02-03

## 2020-02-03 LAB
MISCELLANEOUS LAB TEST RESULT: NORMAL
SL AMB POCT URINE HCG: NEGATIVE

## 2020-02-03 PROCEDURE — 81025 URINE PREGNANCY TEST: CPT | Performed by: OBSTETRICS & GYNECOLOGY

## 2020-02-03 PROCEDURE — 58300 INSERT INTRAUTERINE DEVICE: CPT | Performed by: OBSTETRICS & GYNECOLOGY

## 2020-02-03 NOTE — PROGRESS NOTES
Iud insertions  Date/Time: 2/3/2020 1:43 PM  Performed by: Daniele Arredondo MD  Authorized by: Daniele Arredondo MD     Consent:     Consent obtained:  Verbal    Consent given by:  Patient    Procedure risks and benefits discussed: yes      Patient questions answered: yes      Patient agrees, verbalizes understanding, and wants to proceed: yes      Educational handouts given: yes      Instructions and paperwork completed: yes    Procedure:     Pelvic exam performed: yes      Negative GC/chlamydia test: with pregnancy  Negative urine pregnancy test: yes      Cervix cleaned and prepped: yes      Speculum placed in vagina: yes      Tenaculum applied to cervix: yes      Uterus sounded: yes      Uterus sound depth (cm):  9    IUD inserted with no complications: yes      IUD type:  Mirena    Strings trimmed: yes    Post-procedure:     Patient tolerated procedure well: yes      Patient will follow up after next period: yes        Prior to insertion Hungwaylon and I discussed IUD options  Hematology did discussed theoretical risks of progesterone IUD, but did not say she could not have one  A hormone free IUD would likely worsen her menorrhagia with the coumadin - and so would not be advisable  She is in agreement to proceed with hormonal IUD  She would NOT be a candidate for estrogen containing contraception  She does not plan future pregnancy, which I would support in light of her complications

## 2020-02-06 ENCOUNTER — HOSPITAL ENCOUNTER (OUTPATIENT)
Dept: NEUROLOGY | Facility: CLINIC | Age: 25
Discharge: HOME | End: 2020-02-06
Payer: COMMERCIAL

## 2020-02-06 DIAGNOSIS — R56.9 SEIZURE (HCC): ICD-10-CM

## 2020-02-06 PROCEDURE — 95819 EEG AWAKE AND ASLEEP: CPT | Performed by: PSYCHIATRY & NEUROLOGY

## 2020-02-06 PROCEDURE — 95819 EEG AWAKE AND ASLEEP: CPT

## 2020-02-10 ENCOUNTER — TELEPHONE (OUTPATIENT)
Dept: OBGYN CLINIC | Facility: CLINIC | Age: 25
End: 2020-02-10

## 2020-02-10 NOTE — TELEPHONE ENCOUNTER
----- Message from [facility]  Annetta sent at 2/10/2020  7:32 AM EST -----  Regarding: Visit Follow-Up Question  Contact: [phone]  Hi Dr Luis Loomis,   My IUD that you inserted started my bleeding back up  Now it's like my period all over again    Is that normal?

## 2020-02-13 ENCOUNTER — APPOINTMENT (OUTPATIENT)
Dept: LAB | Facility: MEDICAL CENTER | Age: 25
End: 2020-02-13
Payer: COMMERCIAL

## 2020-02-27 ENCOUNTER — OFFICE VISIT (OUTPATIENT)
Dept: HEMATOLOGY ONCOLOGY | Facility: CLINIC | Age: 25
End: 2020-02-27
Payer: COMMERCIAL

## 2020-02-27 VITALS
WEIGHT: 228 LBS | HEART RATE: 88 BPM | OXYGEN SATURATION: 96 % | SYSTOLIC BLOOD PRESSURE: 124 MMHG | DIASTOLIC BLOOD PRESSURE: 82 MMHG | BODY MASS INDEX: 37.99 KG/M2 | TEMPERATURE: 97.7 F | HEIGHT: 65 IN | RESPIRATION RATE: 16 BRPM

## 2020-02-27 DIAGNOSIS — G08 THROMBOSIS OF SUPERIOR SAGITTAL SINUS: Primary | ICD-10-CM

## 2020-02-27 PROCEDURE — 99214 OFFICE O/P EST MOD 30 MIN: CPT | Performed by: NURSE PRACTITIONER

## 2020-02-27 NOTE — PROGRESS NOTES
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 who then went on to have sagittal sinus thrombosis one week post  section  She was started on Coumadin which she is currently on  This is being managed by her primary care physician and she is seeing our colleagues in neurology who per the notes are anticipating 3-6 months of anticoagulation  She was referred to see us for consideration of a hypercoagulable workup  Again the superior sagittal sinus thrombosis is rare so PNH needs to be evaluated for and we will arrange for this  She did have multiple risk factors going on so I would definitely consider this provoked  She was status post a pregnancy and a week  According to her from a   She also had been receiving IVIG all of which can increase her risk of thrombosis  She is now doing well  Previous Hematologic/ Oncologic History:    Workup  Coumadin     Current Hematologic/ Oncologic Treatment:    Coumadin    Interval History:    The patient returns for follow up visit  Overall she is doing well  She is still on coumadin which is being managed by her PCP and she will be following up with neurology next month  PNH was negative  MTHFR was positive for heterozygous mutation  Prothrombin gene mutation showed a single mutation  Cardiolipin antibody profile shows an elevated anticardiolipin IgG and anticardiolipin IgM  Anticardiolipin IgA was normal   Factor V Leiden was negative  Denies chest pain, SOB, N/V/D, bleeding/bruising, and fever/infection        Test Results:    Imaging: No results found  Labs:   Lab Results   Component Value Date    WBC 6 34 2019    HGB 13 0 2019    HCT 42 2 2019    MCV 88 2019     (H) 2019     Lab Results   Component Value Date    K 3 9 2019     2019    CO2 27 2019    BUN 14 2019    CREATININE 0 67 2019    CALCIUM 8 9 2019    AST 9 2019    ALT 19 2019    ALKPHOS 115 2019    EGFR 123 2019         No results found for: SPEP, UPEP    No results found for: PSA    No results found for: CEA    No results found for:     No results found for: AFP    No results found for: IRON, TIBC, FERRITIN    No results found for: MDFFOVLH02      ROS: As stated in the history of present illness otherwise his 14 point review of systems today was negative        Active Problems:   Patient Active Problem List   Diagnosis    At high risk for  complications    Tobacco use    Alloimmune thrombocytopenia during pregnancy, antepartum    Status post  delivery    Thrombosis of superior sagittal sinus    Seizure ([address] )    Vaginal bleeding       Past Medical History:   Past Medical History:   Diagnosis Date     alloimmune thrombocytopenia     Obesity complicating pregnancy in second trimester 2019    Varicella     childhood       Surgical History:   Past Surgical History:   Procedure Laterality Date     SECTION      CHOLECYSTECTOMY      2017    CHOLECYSTECTOMY LAPAROSCOPIC N/A 2017    Procedure: CHOLECYSTECTOMY LAPAROSCOPIC;  Surgeon: Alvaro Morrison MD;  [facility]: [facility];  Service: General    NM  DELIVERY ONLY N/A 2019    Procedure:  SECTION () REPEAT;  Surgeon: Xuan Rollins MD;  Location: [facility] ;  Service: Obstetrics       Family History:    Family History   Problem Relation Age of Onset    Arthritis Mother     Kidney disease Mother     Other Mother         thyroid, thalassemia, anemia    Diabetes Mother         type 2 insulin    Cancer Mother         lung, gist    Hypertension Father     No Known Problems Brother     Cancer Maternal Grandmother         lung    No Known Problems Son     Stroke Maternal Grandfather     Heart disease Maternal Grandfather         MI    No Known Problems Paternal Grandmother     Cancer Paternal Grandfather         brain    Diabetes Maternal Uncle         type 2 insulin       Cancer-related family history includes Cancer in her maternal grandmother, mother, and paternal grandfather      Social History:   Social History     Socioeconomic History    Marital status:      Spouse name: Aleisha Mariee    Number of children: 3    Years of education: Not on file    Highest education level: Not on file   Occupational History    Occupation: [employer]     Comment: [employer]   Social Needs    Financial resource strain: Not on file    Food insecurity:     Worry: Not on file     Inability: Not on file   [email or web address] needs:     Medical: Not on file     Non-medical: Not on file   Tobacco Use    Smoking status: Former Smoker     Packs/day: 0 50     Types: Cigarettes     Last attempt to quit: 2019     Years since quittin 2    Smokeless tobacco: Never Used    Tobacco comment: 1-2   Substance and Sexual Activity    Alcohol use: Not Currently     Frequency: Never    Drug use: No    Sexual activity: Yes     Partners: Male   Lifestyle    Physical activity:     Days per week: Not on file     Minutes per session: Not on file    Stress: Not on file   Relationships    Social connections:     Talks on phone: Not on file     Gets together: Not on file     Attends Latter-day service: Not on file     Active member of club or organization: Not on file     Attends meetings of clubs or organizations: Not on file     Relationship status: Not on file    Intimate partner violence:     Fear of current or ex partner: Not on file     Emotionally abused: Not on file     Physically abused: Not on file     Forced sexual activity: Not on file   Other Topics Concern    Not on file   Social History Narrative    Always uses seat belts       Current Medications:   Current Outpatient Medications   Medication Sig Dispense Refill    acetaminophen (TYLENOL) 325 mg tablet Take 2 tablets (650 mg total) by mouth every 4 (four) hours as needed for mild pain 30 tablet 0    levETIRAcetam (KEPPRA) 750 mg tablet Take 1 tablet (750 mg total) by mouth every 12 (twelve) hours 90 tablet 0    warfarin (COUMADIN) 5 mg tablet Take 1 tablet (5 mg total) by mouth daily (Patient taking differently: Take 10 mg by mouth daily ) 15 tablet 0     No current facility-administered medications for this visit  Allergies: Allergies   Allergen Reactions    Bee Venom Anaphylaxis     Is suppose to carry EpiPen    Tobramycin      Pt states she is not sure if she is allergic to this        Physical Exam:    There is no height or weight on file to calculate BSA  Wt Readings from Last 3 Encounters:   02/03/20 103 kg (227 lb)   01/23/20 103 kg (228 lb)   01/16/20 103 kg (228 lb)        Temp Readings from Last 3 Encounters:   01/16/20 98 1 °F (36 7 °C) (Oral)   12/11/19 98 4 °F (36 9 °C)   12/01/19 98 2 °F (36 8 °C) (Oral)        BP Readings from Last 3 Encounters:   02/03/20 120/76   01/23/20 128/72   01/16/20 118/82         Pulse Readings from Last 3 Encounters:   01/23/20 56   01/16/20 80   12/09/19 85     @LASTSAO2(3)@      Physical Exam     Constitutional   General appearance: No acute distress, well appearing and well nourished  Eyes   Conjunctiva and lids: No swelling, erythema or discharge  Pupils and irises: Equal, round and reactive to light  Ears, Nose, Mouth, and Throat   External inspection of ears and nose: Normal     Nasal mucosa, septum, and turbinates: Normal without edema or erythema  Oropharynx: Normal with no erythema, edema, exudate or lesions      Pulmonary   Respiratory effort: No increased work of breathing or signs of respiratory distress  Auscultation of lungs: Clear to auscultation  Cardiovascular   Palpation of heart: Normal PMI, no thrills  Auscultation of heart: Normal rate and rhythm, normal S1 and S2, without murmurs  Examination of extremities for edema and/or varicosities: Normal     Carotid pulses: Normal     Abdomen   Abdomen: Non-tender, no masses  Liver and spleen: No hepatomegaly or splenomegaly  Lymphatic   Palpation of lymph nodes in neck: No lymphadenopathy  Musculoskeletal   Gait and station: Normal     Digits and nails: Normal without clubbing or cyanosis  Inspection/palpation of joints, bones, and muscles: Normal     Skin   Skin and subcutaneous tissue: Normal without rashes or lesions  Neurologic   Cranial nerves: Cranial nerves 2-12 intact  Sensation: No sensory loss  Psychiatric   Orientation to person, place, and time: Normal     Mood and affect: Normal         Assessment / Plan:    The patient is a 19-year-old female with autoimmune thrombocytopenia during pregnancy for which she was on IVIG who then went on to have sagittal sinus thrombosis one week post  section  She was started on Coumadin which she is currently on  This is being managed by her primary care physician and she is seeing our colleagues in neurology who advised 6 months of anticoagulation  She does have imaging scheduled with them within the next month  Being on Coumadin a full thrombosis panel cannot be ordered  Factor V Leiden testing along with the genetic test that can be done while on Coumadin were completed  PNH was also checked because of the atypical location of her blood clot  PNH was negative  MTFHR did show heterozygous mutation, prothrombin gene mutation showed single gene mutation, and anticardiolipin IgG and IgM were slightly elevated   This is not an indication for lifelong anticoagulation, however we did discuss that she does carry a slightly higher risk for blood clot  After 6 months of anticoagulation I did recommend for her to stay on a daily baby aspirin  As far as her IUD is concerned Dr Majorie Goltz explained there is a theoretical risk with any hormone including progesterone although the risk would be very small considering it would be with an IUD and not daily systemic therapy but if she wishes to have no risk than she should consider an IUD without any associated progesterone  She does understand the small risk with the progesterone which is mostly theoretical and will discuss this with her GYN  Neurology has suggested 6 months of anticoagulation which I agree with  I advised her to transition to daily baby aspirin after discontinuation of coumadin  Patient is in agreement with the plan  Goals and Barriers:  Current Goal:  Prolong Survival from sagittal sinus thrombosis  Barriers: None  Patient's Capacity to Self Care:  Patient able to self care  Portions of the record may have been created with voice recognition software   Occasional wrong word or "sound a like" substitutions may have occurred due to the inherent limitations of voice recognition software   Read the chart carefully and recognize, using context, where substitutions have occurred

## 2020-03-13 ENCOUNTER — OFFICE VISIT (OUTPATIENT)
Dept: OBGYN CLINIC | Facility: MEDICAL CENTER | Age: 25
End: 2020-03-13
Payer: COMMERCIAL

## 2020-03-13 ENCOUNTER — PATIENT MESSAGE (OUTPATIENT)
Dept: NEUROLOGY | Facility: CLINIC | Age: 25
End: 2020-03-13

## 2020-03-13 VITALS — BODY MASS INDEX: 37.64 KG/M2 | SYSTOLIC BLOOD PRESSURE: 130 MMHG | WEIGHT: 226.2 LBS | DIASTOLIC BLOOD PRESSURE: 64 MMHG

## 2020-03-13 DIAGNOSIS — Z30.431 IUD CHECK UP: Primary | ICD-10-CM

## 2020-03-13 DIAGNOSIS — N93.9 ABNORMAL UTERINE BLEEDING: ICD-10-CM

## 2020-03-13 PROCEDURE — 99212 OFFICE O/P EST SF 10 MIN: CPT | Performed by: OBSTETRICS & GYNECOLOGY

## 2020-03-16 NOTE — TELEPHONE ENCOUNTER
Patient questioning if this doctor's note was ready as she needs this asap  Informed her that we are waiting for a response from Sukhjinder Holbrook however she's only in office on a part time basis  Patient stated she will contact her PCP and see if they will be agreeable to writing the doctor's note  Patient also stated she hasn't received anything from [facility]  Informed her that the [facility] form had been faxed to them  Advised she follow up with them, provided her their number

## 2020-03-16 NOTE — PROGRESS NOTES
John Pabon  1995      CC: IUD check    S: 21 y o  female here for IUD check  She is status post placement of a Mirena IUD on 2/3/2020  She has had heavier vaginal bleeding most recently, although not as bad as her menses  This is bothersome, but live able to her  She has had no pain or other side effects  She continues on her anticoagulation  No LMP recorded      Current Outpatient Medications:     acetaminophen (TYLENOL) 325 mg tablet, Take 2 tablets (650 mg total) by mouth every 4 (four) hours as needed for mild pain, Disp: 30 tablet, Rfl: 0    levETIRAcetam (KEPPRA) 750 mg tablet, Take 1 tablet (750 mg total) by mouth every 12 (twelve) hours, Disp: 90 tablet, Rfl: 0    warfarin (COUMADIN) 5 mg tablet, Take 1 tablet (5 mg total) by mouth daily (Patient taking differently: Take 10 mg by mouth daily ), Disp: 15 tablet, Rfl: 0  Social History     Socioeconomic History    Marital status:      Spouse name: Aleisha Mariee    Number of children: 3    Years of education: Not on file    Highest education level: Not on file   Occupational History    Occupation: [employer]     Comment: [employer]   Social Needs    Financial resource strain: Not on file    Food insecurity:     Worry: Not on file     Inability: Not on file   [employer] needs:     Medical: Not on file     Non-medical: Not on file   Tobacco Use    Smoking status: Former Smoker     Packs/day: 0 50     Types: Cigarettes     Last attempt to quit: 2019     Years since quittin 3    Smokeless tobacco: Never Used    Tobacco comment: 1-2   Substance and Sexual Activity    Alcohol use: Not Currently     Frequency: Never    Drug use: No    Sexual activity: Yes     Partners: Male     Birth control/protection: IUD   Lifestyle    Physical activity:     Days per week: Not on file     Minutes per session: Not on file    Stress: Not on file   Relationships    Social connections:     Talks on phone: Not on file     Gets together: Not on file     Attends Caodaism service: Not on file     Active member of club or organization: Not on file     Attends meetings of clubs or organizations: Not on file     Relationship status: Not on file    Intimate partner violence:     Fear of current or ex partner: Not on file     Emotionally abused: Not on file     Physically abused: Not on file     Forced sexual activity: Not on file   Other Topics Concern    Not on file   Social History Narrative    Always uses seat belts     Family History   Problem Relation Age of Onset    Arthritis Mother     Kidney disease Mother     Other Mother         thyroid, thalassemia, anemia    Diabetes Mother         type 2 insulin    Cancer Mother         lung, gist    Hypertension Father     No Known Problems Brother     Cancer Maternal Grandmother         lung    No Known Problems Son     Stroke Maternal Grandfather     Heart disease Maternal Grandfather         MI    No Known Problems Paternal Grandmother     Cancer Paternal Grandfather         brain    Diabetes Maternal Uncle         type 2 insulin     Past Medical History:   Diagnosis Date     alloimmune thrombocytopenia     Obesity complicating pregnancy in second trimester 2019    Varicella     childhood       O:  Blood pressure 130/64, weight 103 kg (226 lb 3 2 oz), currently breastfeeding  Abdomen is soft and nontender  External genitals are normal without rashes or lesions  Vagina is normal without discharge or bleeding  Cervix is normal without discharge or lesion  IUD strings are normal      A:  IUD in place    P:  Patient was reassured that irregular bleeding is common for the first six months of IUD use and that this should slowly resolve over time  Will check Ultrasound to confirm proper placement

## 2020-03-17 ENCOUNTER — LAB (OUTPATIENT)
Dept: LAB | Facility: MEDICAL CENTER | Age: 25
End: 2020-03-17
Payer: COMMERCIAL

## 2020-03-18 ENCOUNTER — TELEPHONE (OUTPATIENT)
Dept: NEUROLOGY | Facility: CLINIC | Age: 25
End: 2020-03-18

## 2020-03-18 NOTE — LETTER
March 18, 2020         Patient: Wilfred Lambert   YOB: 1995           To Whom it May Concern,      Ehsan Cagle is under the professional care of [facility] Neurology  Due to a medical condition, the soonest date that she would be able to resume driving is June of 5506  If you have any further questions, please do not hesitate to contact our office          Sincerely,      Milly Fraser PA-C

## 2020-03-18 NOTE — TELEPHONE ENCOUNTER
Pt called in and was made aware  She states that statement will work   Letter generated and faxed as well as sent to patient's mychart per her request

## 2020-03-18 NOTE — TELEPHONE ENCOUNTER
See 3/13 patient messages  Patient made aware of Denae's response  She became upset, asking "so I had a seizure and have no way of getting to work, that doesn't count as medical?" I explained that we cannot write a note stating she cannot work due to medical reasons as the problem is with her transportation and not her ability to perform her job  She is asking if Mirta Pierson is agreeable to a letter stating she is unable to drive until June if she remains seizure free  Are you agreeable to this letter? Thanks!     [address] to leave detailed message

## 2020-03-18 NOTE — TELEPHONE ENCOUNTER
We can write a letter that she is unable to drive until June but I'm not sure that will help  Thanks

## 2020-03-26 ENCOUNTER — OFFICE VISIT (OUTPATIENT)
Dept: NEUROLOGY | Facility: CLINIC | Age: 25
End: 2020-03-26
Payer: COMMERCIAL

## 2020-03-26 VITALS
SYSTOLIC BLOOD PRESSURE: 118 MMHG | DIASTOLIC BLOOD PRESSURE: 59 MMHG | WEIGHT: 229 LBS | HEART RATE: 79 BPM | HEIGHT: 65 IN | BODY MASS INDEX: 38.15 KG/M2

## 2020-03-26 DIAGNOSIS — R56.9 SEIZURE (HCC): ICD-10-CM

## 2020-03-26 DIAGNOSIS — G08 THROMBOSIS OF SUPERIOR SAGITTAL SINUS: Primary | ICD-10-CM

## 2020-03-26 PROCEDURE — 99214 OFFICE O/P EST MOD 30 MIN: CPT | Performed by: PHYSICIAN ASSISTANT

## 2020-03-26 RX ORDER — LEVETIRACETAM 750 MG/1
750 TABLET ORAL EVERY 12 HOURS SCHEDULED
Qty: 90 TABLET | Refills: 3 | Status: SHIPPED | OUTPATIENT
Start: 2020-03-26 | End: 2020-07-29 | Stop reason: SDUPTHER

## 2020-03-26 NOTE — PATIENT INSTRUCTIONS
Thrombosis of superior sagittal sinus  · MRV of the brain to be done for follow-up  · She will continue Coumadin for 6 months  · When she has completed Coumadin in June she will start Aspirin 81mg daily  I have spent 25 minutes with Patient and family today in which greater than 50% of this time was spent in counseling/coordination of care regarding Diagnostic results, Prognosis, Risks and benefits of tx options, Intructions for management, Patient and family education, Importance of tx compliance, Risk factor reductions and Impressions  She will follow-up in 3 months  [address]iz[address] [address] )  · She will continue Keppra 750mg 2x daily  · She will follow-up with Epileptology

## 2020-03-26 NOTE — PROGRESS NOTES
Patient ID: Roxie Person is a 21 y o  female  Assessment/Plan: Thrombosis of superior sagittal sinus  · MRV of the brain to be done for follow-up  · She will continue Coumadin for 6 months  · When she has completed Coumadin in June she will start Aspirin 81mg daily  I have spent 25 minutes with Patient and family today in which greater than 50% of this time was spent in counseling/coordination of care regarding Diagnostic results, Prognosis, Risks and benefits of tx options, Intructions for management, Patient and family education, Importance of tx compliance, Risk factor reductions and Impressions  She will follow-up in 3 months  Seizure ([facility] )  · She will continue Keppra 750mg 2x daily  · She will follow-up with Epileptology  Problem List Items Addressed This Visit        Cardiovascular and Mediastinum    Thrombosis of superior sagittal sinus - Primary     · MRV of the brain to be done for follow-up  · She will continue Coumadin for 6 months  · When she has completed Coumadin in June she will start Aspirin 81mg daily  I have spent 25 minutes with Patient and family today in which greater than 50% of this time was spent in counseling/coordination of care regarding Diagnostic results, Prognosis, Risks and benefits of tx options, Intructions for management, Patient and family education, Importance of tx compliance, Risk factor reductions and Impressions  She will follow-up in 3 months  Relevant Medications    levETIRAcetam (KEPPRA) 750 mg tablet    Other Relevant Orders    MRV head wo contrast       Other    Seizure ([facility] )     · She will continue Keppra 750mg 2x daily  · She will follow-up with Epileptology  Relevant Medications    levETIRAcetam (KEPPRA) 750 mg tablet             Subjective:    URIEL    Brian Pate returns today for follow-up of thrombosis of the superior sagittal sinus and seizure  She remains on Coumadin and Keppra   She states that she is tolerating the medications without difficulty  She did have her period for 4 months which OBGYN attributed to Coumadin  She now has an IUD  She will be able to stop Coumadin in 6 months and start Aspirin 81mg daily per Hematology  She reports that she has been anxious and does not like to be alone since the seizure  She will be seeing her PCP soon and will discuss further with him  She has not had any more seizures  Reviewed EEG results with pt  The following portions of the patient's history were reviewed and updated as appropriate: allergies, current medications, past family history, past medical history, past social history, past surgical history and problem list          Objective:    Blood pressure 118/59, pulse 79, height 5' 5" (1 651 m), weight 104 kg (229 lb), currently breastfeeding  Physical Exam   Eyes: Pupils are equal, round, and reactive to light  Lids are normal    Neurological: She has normal strength and normal reflexes  Coordination normal    Psychiatric: Her speech is normal    Vitals reviewed  Neurological Exam  Mental Status   Oriented to person, place, time and situation  Recent and remote memory are intact  Speech is normal  Language is fluent with no aphasia  Attention and concentration are normal  Fund of knowledge is appropriate for level of education  Apraxia absent  Cranial Nerves  CN II: Visual acuity is normal  Visual fields full to confrontation  CN III, IV, VI: Extraocular movements intact bilaterally  Normal lids and orbits bilaterally  Pupils equal round and reactive to light bilaterally  CN V: Facial sensation is normal   CN VII: Full and symmetric facial movement  CN VIII: Hearing is normal   CN IX, X: Palate elevates symmetrically  Normal gag reflex  CN XI: Shoulder shrug strength is normal   CN XII: Tongue midline without atrophy or fasciculations  Motor   Strength is 5/5 throughout all four extremities      Sensory  Sensation is intact to light touch, pinprick, vibration and proprioception in all four extremities  Reflexes  Deep tendon reflexes are 2+ and symmetric in all four extremities with downgoing toes bilaterally  Coordination  Finger-to-nose, rapid alternating movements and heel-to-shin normal bilaterally without dysmetria  Gait  Normal casual, toe, heel and tandem gait  ROS:    Review of Systems   Constitutional: Negative  Negative for appetite change and fever  HENT: Negative  Negative for hearing loss, tinnitus, trouble swallowing and voice change  Eyes: Negative  Negative for photophobia and pain  Respiratory: Negative  Negative for shortness of breath  Cardiovascular: Negative  Negative for palpitations  Gastrointestinal: Negative  Negative for nausea and vomiting  Endocrine: Negative  Negative for cold intolerance  Genitourinary: Negative  Negative for dysuria, frequency and urgency  Musculoskeletal: Negative  Negative for myalgias and neck pain  Skin: Negative  Negative for rash  Neurological: Negative  Negative for dizziness, tremors, seizures (dec 5th last seizure), syncope, facial asymmetry, speech difficulty, weakness, light-headedness, numbness and headaches  Hematological: Negative  Does not bruise/bleed easily  Psychiatric/Behavioral: Negative for confusion, hallucinations and sleep disturbance  The patient is nervous/anxious  Review of systems personally reviewed

## 2020-03-26 NOTE — ASSESSMENT & PLAN NOTE
· MRV of the brain to be done for follow-up  · She will continue Coumadin for 6 months  · When she has completed Coumadin in June she will start Aspirin 81mg daily  I have spent 25 minutes with Patient and family today in which greater than 50% of this time was spent in counseling/coordination of care regarding Diagnostic results, Prognosis, Risks and benefits of tx options, Intructions for management, Patient and family education, Importance of tx compliance, Risk factor reductions and Impressions  She will follow-up in 3 months

## 2020-04-17 ENCOUNTER — HOSPITAL ENCOUNTER (OUTPATIENT)
Dept: MRI IMAGING | Facility: HOSPITAL | Age: 25
Discharge: HOME | End: 2020-04-17
Payer: COMMERCIAL

## 2020-04-17 DIAGNOSIS — G08 THROMBOSIS OF SUPERIOR SAGITTAL SINUS: ICD-10-CM

## 2020-04-17 PROCEDURE — 70544 MR ANGIOGRAPHY HEAD W/O DYE: CPT

## 2020-04-22 ENCOUNTER — APPOINTMENT (OUTPATIENT)
Dept: LAB | Facility: MEDICAL CENTER | Age: 25
End: 2020-04-22
Payer: COMMERCIAL

## 2020-05-11 NOTE — ASSESSMENT & PLAN NOTE
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 and surrounding R parietal edema complicated by seizure  Unclear etiology of CVT - potentially post-partum hypercoagulability vs  underlying hypercoagulable/autoimmune disease    Neurology consulted on admission and following   -Patient's symptoms (LUE weakness numbness) have completely resolved  -MRI and CTA reviewed - superior sagittal thrombosis with adjacent cortical vein thrombosis and edema, no clear infarct/hemorrhage  -HCT- no hemorrhage  -Per Neuro's recs: continue heparin gtt, lower PTT goal of 50-70 given risk of hemorrhage  -Will continue to bridge to coumadin- once INR is therapeutic, neuro recommends repeat Head CT  -Continue close neurochecks with stat CT with any changes  -continue Keppra 750mg BID  -no indication for EEG at this time  -thrombosis panel - ATIII normal, anticardiolipin IgG indeterminate and IgM low positive; rest pending  -CRP, MORENA, ANCA pending  - Per neuro: will need at least 3-6 months of AC with plan to repeat MRV in about 3 months; may need lifelong if evidence of underlying hypercoagulable state emerges  -will need follow-up with stroke clinic as outpatient In responding to this request, please exercise your independent professional judgment.  The fact that a question is asked does not imply that any particular answer is desired or expected.

## 2020-05-18 ENCOUNTER — LAB (OUTPATIENT)
Dept: LAB | Facility: MEDICAL CENTER | Age: 25
End: 2020-05-18
Payer: COMMERCIAL

## 2020-05-27 ENCOUNTER — TELEPHONE (OUTPATIENT)
Dept: NEUROLOGY | Facility: CLINIC | Age: 25
End: 2020-05-27

## 2020-07-02 ENCOUNTER — OFFICE VISIT (OUTPATIENT)
Dept: NEUROLOGY | Facility: CLINIC | Age: 25
End: 2020-07-02
Payer: COMMERCIAL

## 2020-07-02 VITALS
TEMPERATURE: 99 F | BODY MASS INDEX: 38.82 KG/M2 | SYSTOLIC BLOOD PRESSURE: 108 MMHG | HEIGHT: 65 IN | HEART RATE: 96 BPM | DIASTOLIC BLOOD PRESSURE: 72 MMHG | WEIGHT: 233 LBS

## 2020-07-02 DIAGNOSIS — R56.9 SEIZURE (HCC): ICD-10-CM

## 2020-07-02 DIAGNOSIS — G08 THROMBOSIS OF SUPERIOR SAGITTAL SINUS: Primary | ICD-10-CM

## 2020-07-02 PROCEDURE — 99214 OFFICE O/P EST MOD 30 MIN: CPT | Performed by: PHYSICIAN ASSISTANT

## 2020-07-02 RX ORDER — ASPIRIN 81 MG/1
81 TABLET ORAL DAILY
Qty: 30 TABLET
Start: 2020-07-02

## 2020-07-02 NOTE — PATIENT INSTRUCTIONS
Thrombosis of superior sagittal sinus  · Pt has completed 6 months of Coumadin therapy  · She will stop Coumadin and start Aspirin 81mg daily  · Side effects were reviewed  · She was advised to get enteric coated Aspirin  · She will return to the ER with any symptoms suggestive of new thrombosis such as severe headache  I have spent 25 minutes with Patient  today in which greater than 50% of this time was spent in counseling/coordination of care regarding Diagnostic results, Prognosis, Risks and benefits of tx options, Intructions for management, Patient and family education, Importance of tx compliance, Risk factor reductions and Impressions  She will follow-up in 6 months  Seizure ([facility] )  · Continue Keppra  · Follow-up with Dr Marcelo Quintero

## 2020-07-02 NOTE — PROGRESS NOTES
Patient ID: Jeffrey Glaser is a 21 y o  female  Assessment/Plan: Thrombosis of superior sagittal sinus  · Pt has completed 6 months of Coumadin therapy  · She will stop Coumadin and start Aspirin 81mg daily  · Side effects were reviewed  · She was advised to get enteric coated Aspirin  · She will return to the ER with any symptoms suggestive of new thrombosis such as severe headache  I have spent 25 minutes with Patient  today in which greater than 50% of this time was spent in counseling/coordination of care regarding Diagnostic results, Prognosis, Risks and benefits of tx options, Intructions for management, Patient and family education, Importance of tx compliance, Risk factor reductions and Impressions  She will follow-up in 6 months  Seizure ([address] )  · Continue Keppra  · Follow-up with Dr Shiela Suarez  Problem List Items Addressed This Visit        Cardiovascular and Mediastinum    Thrombosis of superior sagittal sinus - Primary     · Pt has completed 6 months of Coumadin therapy  · She will stop Coumadin and start Aspirin 81mg daily  · Side effects were reviewed  · She was advised to get enteric coated Aspirin  · She will return to the ER with any symptoms suggestive of new thrombosis such as severe headache  I have spent 25 minutes with Patient  today in which greater than 50% of this time was spent in counseling/coordination of care regarding Diagnostic results, Prognosis, Risks and benefits of tx options, Intructions for management, Patient and family education, Importance of tx compliance, Risk factor reductions and Impressions  She will follow-up in 6 months  Relevant Medications    aspirin (ECOTRIN LOW STRENGTH) 81 mg EC tablet       Other    Seizure ([facility])     · Continue Keppra  · Follow-up with Dr Shiela Suarez  Subjective:    URIEL Monte returns today for follow-up of thrombosis of the superior sagittal sinus and seizure   She remains on Coumadin and Keppra  She has completed 6 months of anticoagulation  She does follow with Hematology as well  She does report bruising  She had an IUD placed and vaginal bleeding has lessened  She denies any new symptoms  She is taking Keppra 750mg 2x daily  She has not had any seizures, tongue bites or incontinence  She is scheduled to see Epileptology at the end of this month  She is driving  The following portions of the patient's history were reviewed and updated as appropriate: allergies, current medications, past family history, past medical history, past social history, past surgical history and problem list          Objective:    Blood pressure 108/72, pulse 96, temperature 99 °F (37 2 °C), height 5' 5" (1 651 m), weight 106 kg (233 lb), currently breastfeeding  Physical Exam   Eyes: Pupils are equal, round, and reactive to light  Lids are normal    Neurological: She is alert  She has normal strength and normal reflexes  Coordination normal    Psychiatric: Her speech is normal    Vitals reviewed  Neurological Exam  Mental Status  Awake and alert  Oriented to person, place, time and situation  Recent and remote memory are intact  Speech is normal  Language is fluent with no aphasia  Attention and concentration are normal  Fund of knowledge is appropriate for level of education  Apraxia absent  Cranial Nerves  CN II: Visual acuity is normal  Visual fields full to confrontation  CN III, IV, VI: Extraocular movements intact bilaterally  Normal lids and orbits bilaterally  Pupils equal round and reactive to light bilaterally  CN V: Facial sensation is normal   CN VII: Full and symmetric facial movement  CN VIII: Hearing is normal   CN IX, X: Palate elevates symmetrically  Normal gag reflex  CN XI: Shoulder shrug strength is normal   CN XII: Tongue midline without atrophy or fasciculations  Motor   Strength is 5/5 throughout all four extremities      Sensory  Sensation is intact to light touch, pinprick, vibration and proprioception in all four extremities  Reflexes  Deep tendon reflexes are 2+ and symmetric in all four extremities with downgoing toes bilaterally  Coordination  Finger-to-nose, rapid alternating movements and heel-to-shin normal bilaterally without dysmetria  Gait  Normal casual, toe, heel and tandem gait  ROS:    Review of Systems   Constitutional: Negative  Negative for appetite change and fever  HENT: Negative  Negative for hearing loss, tinnitus, trouble swallowing and voice change  Eyes: Negative  Negative for photophobia and pain  Respiratory: Negative  Negative for shortness of breath  Cardiovascular: Negative  Negative for palpitations  Gastrointestinal: Negative  Negative for nausea and vomiting  Endocrine: Negative  Negative for cold intolerance  Genitourinary: Negative  Negative for dysuria, frequency and urgency  Musculoskeletal: Negative  Negative for myalgias and neck pain  Skin: Negative  Negative for rash  Allergic/Immunologic: Negative  Neurological: Negative  Negative for dizziness, tremors, seizures, syncope, facial asymmetry, speech difficulty, weakness, light-headedness, numbness and headaches  Hematological: Negative  Does not bruise/bleed easily  Psychiatric/Behavioral: Negative  Negative for confusion, hallucinations and sleep disturbance  Review of systems personally reviewed

## 2020-07-02 NOTE — ASSESSMENT & PLAN NOTE
· Pt has completed 6 months of Coumadin therapy  · She will stop Coumadin and start Aspirin 81mg daily  · Side effects were reviewed  · She was advised to get enteric coated Aspirin  · She will return to the ER with any symptoms suggestive of new thrombosis such as severe headache  I have spent 25 minutes with Patient  today in which greater than 50% of this time was spent in counseling/coordination of care regarding Diagnostic results, Prognosis, Risks and benefits of tx options, Intructions for management, Patient and family education, Importance of tx compliance, Risk factor reductions and Impressions  She will follow-up in 6 months

## 2020-07-07 ENCOUNTER — TELEPHONE (OUTPATIENT)
Dept: HEMATOLOGY ONCOLOGY | Facility: MEDICAL CENTER | Age: 25
End: 2020-07-07

## 2020-07-07 ENCOUNTER — APPOINTMENT (OUTPATIENT)
Dept: LAB | Facility: MEDICAL CENTER | Age: 25
End: 2020-07-07
Payer: COMMERCIAL

## 2020-07-07 DIAGNOSIS — G08 THROMBOSIS OF SUPERIOR SAGITTAL SINUS: ICD-10-CM

## 2020-07-07 LAB
ALBUMIN SERPL BCP-MCNC: 3.5 G/DL (ref 3.5–5)
ALP SERPL-CCNC: 95 U/L (ref 46–116)
ALT SERPL W P-5'-P-CCNC: 36 U/L (ref 12–78)
ANION GAP SERPL CALCULATED.3IONS-SCNC: 4 MMOL/L (ref 4–13)
AST SERPL W P-5'-P-CCNC: 17 U/L (ref 5–45)
BASOPHILS # BLD AUTO: 0.03 THOUSANDS/ΜL (ref 0–0.1)
BASOPHILS NFR BLD AUTO: 0 % (ref 0–1)
BILIRUB SERPL-MCNC: 0.31 MG/DL (ref 0.2–1)
BUN SERPL-MCNC: 16 MG/DL (ref 5–25)
CALCIUM SERPL-MCNC: 8.9 MG/DL (ref 8.3–10.1)
CHLORIDE SERPL-SCNC: 107 MMOL/L (ref 100–108)
CO2 SERPL-SCNC: 26 MMOL/L (ref 21–32)
CREAT SERPL-MCNC: 0.64 MG/DL (ref 0.6–1.3)
EOSINOPHIL # BLD AUTO: 0.34 THOUSAND/ΜL (ref 0–0.61)
EOSINOPHIL NFR BLD AUTO: 5 % (ref 0–6)
ERYTHROCYTE [DISTWIDTH] IN BLOOD BY AUTOMATED COUNT: 15.3 % (ref 11.6–15.1)
GFR SERPL CREATININE-BSD FRML MDRD: 124 ML/MIN/1.73SQ M
GLUCOSE SERPL-MCNC: 105 MG/DL (ref 65–140)
HCT VFR BLD AUTO: 40.1 % (ref 34.8–46.1)
HGB BLD-MCNC: 12.5 G/DL (ref 11.5–15.4)
IMM GRANULOCYTES # BLD AUTO: 0.03 THOUSAND/UL (ref 0–0.2)
IMM GRANULOCYTES NFR BLD AUTO: 0 % (ref 0–2)
LYMPHOCYTES # BLD AUTO: 1.98 THOUSANDS/ΜL (ref 0.6–4.47)
LYMPHOCYTES NFR BLD AUTO: 29 % (ref 14–44)
MCH RBC QN AUTO: 26.7 PG (ref 26.8–34.3)
MCHC RBC AUTO-ENTMCNC: 31.2 G/DL (ref 31.4–37.4)
MCV RBC AUTO: 86 FL (ref 82–98)
MONOCYTES # BLD AUTO: 0.4 THOUSAND/ΜL (ref 0.17–1.22)
MONOCYTES NFR BLD AUTO: 6 % (ref 4–12)
NEUTROPHILS # BLD AUTO: 4.07 THOUSANDS/ΜL (ref 1.85–7.62)
NEUTS SEG NFR BLD AUTO: 60 % (ref 43–75)
NRBC BLD AUTO-RTO: 0 /100 WBCS
PLATELET # BLD AUTO: 270 THOUSANDS/UL (ref 149–390)
PMV BLD AUTO: 10.2 FL (ref 8.9–12.7)
POTASSIUM SERPL-SCNC: 4.3 MMOL/L (ref 3.5–5.3)
PROT SERPL-MCNC: 7.1 G/DL (ref 6.4–8.2)
RBC # BLD AUTO: 4.68 MILLION/UL (ref 3.81–5.12)
SODIUM SERPL-SCNC: 137 MMOL/L (ref 136–145)
WBC # BLD AUTO: 6.85 THOUSAND/UL (ref 4.31–10.16)

## 2020-07-07 PROCEDURE — 85025 COMPLETE CBC W/AUTO DIFF WBC: CPT

## 2020-07-07 PROCEDURE — 80053 COMPREHEN METABOLIC PANEL: CPT

## 2020-07-07 PROCEDURE — 36415 COLL VENOUS BLD VENIPUNCTURE: CPT

## 2020-07-07 NOTE — TELEPHONE ENCOUNTER
Patient called in to cancel today's 07/07/2020  appointment her car broke down I reschedule patient for 07/23/2020

## 2020-07-23 ENCOUNTER — OFFICE VISIT (OUTPATIENT)
Dept: HEMATOLOGY ONCOLOGY | Facility: CLINIC | Age: 25
End: 2020-07-23
Payer: COMMERCIAL

## 2020-07-23 VITALS
WEIGHT: 235 LBS | SYSTOLIC BLOOD PRESSURE: 132 MMHG | DIASTOLIC BLOOD PRESSURE: 72 MMHG | BODY MASS INDEX: 39.15 KG/M2 | HEART RATE: 107 BPM | RESPIRATION RATE: 16 BRPM | OXYGEN SATURATION: 96 % | HEIGHT: 65 IN | TEMPERATURE: 99.1 F

## 2020-07-23 DIAGNOSIS — G08 THROMBOSIS OF SUPERIOR SAGITTAL SINUS: Primary | ICD-10-CM

## 2020-07-23 PROCEDURE — 99214 OFFICE O/P EST MOD 30 MIN: CPT | Performed by: INTERNAL MEDICINE

## 2020-07-23 NOTE — PROGRESS NOTES
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 who then went on to have sagittal sinus thrombosis one week post  section  She was started on Coumadin which she is currently on  This is being managed by her primary care physician and she is seeing our colleagues in neurology who per the notes are anticipating 3-6 months of anticoagulation  She was referred to see us for consideration of a hypercoagulable workup  Again the superior sagittal sinus thrombosis is rare so PNH needs to be evaluated for and we will arrange for this  She did have multiple risk factors going on so I would definitely consider this provoked  She was status post a pregnancy and a week  According to her from a   She also had been receiving IVIG all of which can increase her risk of thrombosis  Previous Hematologic/ Oncologic History:    Workup  Coumadin    Current Hematologic/ Oncologic Treatment:    Coumadin    Interval History:    The patient returns for follow-up visit  She is doing well  She has stopped her Coumadin and is now on a baby aspirin  PNH was negative  MTHFR was positive for heterozygous mutation with prothrombin gene mutations showing a single mutation  Cardiolipin antibody profile had shown an elevated anti cardiolipin IgG and IgM with a normal IgA  Again the thrombosis she had was provoked so at this point I am comfortable with the decision to just have her on a baby aspirin  If she ever has another clot she would need lifelong anticoagulation  The patient is in agreement with this    As far symptoms are concerned she denies any nausea denies any vomiting denies any diarrhea  Overall feels good  Really denies any other complaints  The rest of her 14 point review of systems today was negative  Test Results:    Imaging: No results found  Labs:   Lab Results   Component Value Date    WBC 6 85 2020    HGB 12 5 2020    HCT 40 1 2020    MCV 86 2020     2020     Lab Results   Component Value Date    K 4 3 2020     2020    CO2 26 2020    BUN 16 2020    CREATININE 0 64 2020    CALCIUM 8 9 2020    AST 17 2020    ALT 36 2020    ALKPHOS 95 2020    EGFR 124 2020       ROS: As stated in the history of present illness otherwise his 14 point review of systems today was negative        Active Problems:   Patient Active Problem List   Diagnosis    At high risk for  complications    Tobacco use    Alloimmune thrombocytopenia during pregnancy, antepartum    Status post  delivery    Thrombosis of superior sagittal sinus    Seizure ([address] )    Vaginal bleeding       Past Medical History:   Past Medical History:   Diagnosis Date     alloimmune thrombocytopenia     Obesity complicating pregnancy in second trimester 2019    Varicella     childhood       Surgical History:   Past Surgical History:   Procedure Laterality Date     SECTION      CHOLECYSTECTOMY      2017    CHOLECYSTECTOMY LAPAROSCOPIC N/A 2017    Procedure: CHOLECYSTECTOMY LAPAROSCOPIC;  Surgeon: Gabi Wilder MD;  [facility];  Service: General    DE  DELIVERY ONLY N/A 2019    Procedure:  SECTION () REPEAT;  Surgeon: David Dickey MD;  [facility] ;  Service: Obstetrics       Family History:    Family History   Problem Relation Age of Onset    Arthritis Mother     Kidney disease Mother     Other Mother         thyroid, thalassemia, anemia    Diabetes Mother         type 2 insulin    Cancer Mother         lung, gist    Hypertension Father     No Known Problems Brother     Cancer Maternal Grandmother         lung    No Known Problems Son     Stroke Maternal Grandfather     Heart disease Maternal Grandfather         MI    No Known Problems Paternal Grandmother     Cancer Paternal Grandfather         brain    Diabetes Maternal Uncle         type 2 insulin       Cancer-related family history includes Cancer in her maternal grandmother, mother, and paternal grandfather      Social History:   Social History     Socioeconomic History    Marital status:      Spouse name: Corona Garrett    Number of children: 3    Years of education: Not on file    Highest education level: Not on file   Occupational History    Occupation: [employer]     Comment: [employer]   Social Needs    Financial resource strain: Not on file    Food insecurity:     Worry: Not on file     Inability: Not on file   Patient Home Monitoring needs:     Medical: Not on file     Non-medical: Not on file   Tobacco Use    Smoking status: Former Smoker     Packs/day: 0 50     Types: Cigarettes     Last attempt to quit: 2019     Years since quittin 6    Smokeless tobacco: Never Used    Tobacco comment: 1-2   Substance and Sexual Activity    Alcohol use: Not Currently     Frequency: Never    Drug use: No    Sexual activity: Yes     Partners: Male     Birth control/protection: IUD   Lifestyle    Physical activity:     Days per week: Not on file     Minutes per session: Not on file    Stress: Not on file   Relationships    Social connections:     Talks on phone: Not on file     Gets together: Not on file     Attends Adventist service: Not on file     Active member of club or organization: Not on file     Attends meetings of clubs or organizations: Not on file     Relationship status: Not on file    Intimate partner violence:     Fear of current or ex partner: Not on file Emotionally abused: Not on file     Physically abused: Not on file     Forced sexual activity: Not on file   Other Topics Concern    Not on file   Social History Narrative    Always uses seat belts       Current Medications:   Current Outpatient Medications   Medication Sig Dispense Refill    acetaminophen (TYLENOL) 325 mg tablet Take 2 tablets (650 mg total) by mouth every 4 (four) hours as needed for mild pain 30 tablet 0    aspirin (ECOTRIN LOW STRENGTH) 81 mg EC tablet Take 1 tablet (81 mg total) by mouth daily 30 tablet     levETIRAcetam (KEPPRA) 750 mg tablet Take 1 tablet (750 mg total) by mouth every 12 (twelve) hours 90 tablet 3    warfarin (COUMADIN) 5 mg tablet Take 1 tablet (5 mg total) by mouth daily (Patient not taking: Reported on 7/23/2020) 15 tablet 0     No current facility-administered medications for this visit  Allergies: Allergies   Allergen Reactions    Bee Venom Anaphylaxis     Is suppose to carry EpiPen    Tobramycin      Pt states she is not sure if she is allergic to this        Physical Exam:    Body surface area is 2 12 meters squared  Wt Readings from Last 3 Encounters:   07/23/20 107 kg (235 lb)   07/02/20 106 kg (233 lb)   04/17/20 104 kg (229 lb)        Temp Readings from Last 3 Encounters:   07/23/20 99 1 °F (37 3 °C) (Tympanic)   07/02/20 99 °F (37 2 °C)   02/27/20 97 7 °F (36 5 °C) (Tympanic)        BP Readings from Last 3 Encounters:   07/23/20 132/72   07/02/20 108/72   03/26/20 118/59         Pulse Readings from Last 3 Encounters:   07/23/20 (!) 107   07/02/20 96   03/26/20 79         Physical Exam     Constitutional   General appearance: No acute distress, well appearing and well nourished  Eyes   Conjunctiva and lids: No swelling, erythema or discharge  Pupils and irises: Equal, round and reactive to light      Ears, Nose, Mouth, and Throat   External inspection of ears and nose: Normal     Nasal mucosa, septum, and turbinates: Normal without edema or erythema  Oropharynx: Normal with no erythema, edema, exudate or lesions  Pulmonary   Respiratory effort: No increased work of breathing or signs of respiratory distress  Auscultation of lungs: Clear to auscultation  Cardiovascular   Palpation of heart: Normal PMI, no thrills  Auscultation of heart: Normal rate and rhythm, normal S1 and S2, without murmurs  Examination of extremities for edema and/or varicosities: Normal     Carotid pulses: Normal     Abdomen   Abdomen: Non-tender, no masses  Liver and spleen: No hepatomegaly or splenomegaly  Lymphatic   Palpation of lymph nodes in neck: No lymphadenopathy  Musculoskeletal   Gait and station: Normal     Digits and nails: Normal without clubbing or cyanosis  Inspection/palpation of joints, bones, and muscles: Normal     Skin   Skin and subcutaneous tissue: Normal without rashes or lesions  Neurologic   Cranial nerves: Cranial nerves 2-12 intact  Sensation: No sensory loss  Psychiatric   Orientation to person, place, and time: Normal     Mood and affect: Normal         Assessment / Plan:      The patient is a pleasant 77-year-old female with autoimmune thrombocytopenia during pregnancy for which she was on IVIG who went on to have a sagittal sinus thrombosis 1 week post Caesarean section  She was started on Coumadin which she completed and is now on a baby aspirin  Neurology had advised 6 months of anticoagulation and I agreed with this  Her PNH testing was negative  She did have some minor risk factors including a positive anti cardiolipin IgG and IgM along with a heterozygous mutation in MTHFR and a single gene mutation for prothrombin gene mutation but this alone would not be a reason to put her on lifelong anticoagulation at such a young age  If she were to have another blood clot this would definitely be a consideration  The patient agrees and understands    She understands the slight risk of repeat clotting and is aware of the symptoms  I will see her as needed  She will follow with her other physicians including her neurologist   If she has any questions she will call our office  Goals and Barriers:  Current Goal:  Prolong Survival from sinus thrombosis   Barriers: None  Patient's Capacity to Self Care:  Patient able to self care  Portions of the record may have been created with voice recognition software   Occasional wrong word or "sound a like" substitutions may have occurred due to the inherent limitations of voice recognition software   Read the chart carefully and recognize, using context, where substitutions have occurred

## 2020-07-24 ENCOUNTER — ANNUAL EXAM (OUTPATIENT)
Dept: OBGYN CLINIC | Facility: MEDICAL CENTER | Age: 25
End: 2020-07-24
Payer: COMMERCIAL

## 2020-07-24 VITALS
WEIGHT: 234.6 LBS | SYSTOLIC BLOOD PRESSURE: 118 MMHG | TEMPERATURE: 98.4 F | BODY MASS INDEX: 37.7 KG/M2 | HEIGHT: 66 IN | DIASTOLIC BLOOD PRESSURE: 78 MMHG

## 2020-07-24 DIAGNOSIS — Z01.419 ENCOUNTER FOR GYNECOLOGICAL EXAMINATION WITHOUT ABNORMAL FINDING: Primary | ICD-10-CM

## 2020-07-24 PROCEDURE — S0612 ANNUAL GYNECOLOGICAL EXAMINA: HCPCS | Performed by: OBSTETRICS & GYNECOLOGY

## 2020-07-24 NOTE — PROGRESS NOTES
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, total of 5 kids between her and partner  Sexual activity: She is sexually active without pain, bleeding or dryness  Contraception:  She uses Mirena for contraception  STD testing:  She does not request STD testing today  We reviewed ASC guidelines for Pap testing       Last Pap 19 - Normal Cytology    Family hx of breast cancer: no  Family hx of ovarian cancer:  no  Family hx of colon cancer: no      Current Outpatient Medications:     acetaminophen (TYLENOL) 325 mg tablet, Take 2 tablets (650 mg total) by mouth every 4 (four) hours as needed for mild pain, Disp: 30 tablet, Rfl: 0    aspirin (ECOTRIN LOW STRENGTH) 81 mg EC tablet, Take 1 tablet (81 mg total) by mouth daily, Disp: 30 tablet, Rfl:     levETIRAcetam (KEPPRA) 750 mg tablet, Take 1 tablet (750 mg total) by mouth every 12 (twelve) hours, Disp: 90 tablet, Rfl: 3  Social History     Socioeconomic History    Marital status:      Spouse name: Michael Gilbert    Number of children: 3    Years of education: Not on file    Highest education level: Not on file   Occupational History    Occupation: [employer]     Comment: [employer]   Social Needs    Financial resource strain: Not on file    Food insecurity:     Worry: Not on file     Inability: Not on file   [employer] needs:     Medical: Not on file     Non-medical: Not on file   Tobacco Use    Smoking status: Former Smoker     Packs/day: 0 50     Types: Cigarettes     Last attempt to quit: 2019     Years since quittin 6    Smokeless tobacco: Never Used    Tobacco comment: 1-2   Substance and Sexual Activity    Alcohol use: Not Currently     Frequency: Never    Drug use: No    Sexual activity: Yes     Partners: Male     Birth control/protection: IUD     Comment: mirena 2/3/2020   Lifestyle    Physical activity:     Days per week: Not on file     Minutes per session: Not on file    Stress: Not on file   Relationships    Social connections:     Talks on phone: Not on file     Gets together: Not on file     Attends Adventism service: Not on file     Active member of club or organization: Not on file     Attends meetings of clubs or organizations: Not on file     Relationship status: Not on file    Intimate partner violence:     Fear of current or ex partner: Not on file     Emotionally abused: Not on file     Physically abused: Not on file     Forced sexual activity: Not on file   Other Topics Concern    Not on file   Social History Narrative    Always uses seat belts     Family History   Problem Relation Age of Onset    Arthritis Mother     Kidney disease Mother     Other Mother         thyroid, thalassemia, anemia    Diabetes Mother         type 2 insulin    Cancer Mother         lung, gist    Hypertension Father     No Known Problems Brother     Cancer Maternal Grandmother         lung    No Known Problems Son     Stroke Maternal Grandfather     Heart disease Maternal Grandfather         MI    No Known Problems Paternal Grandmother     Cancer Paternal Grandfather         brain    Diabetes Maternal Uncle         type 2 insulin     Past Medical History:   Diagnosis Date     alloimmune thrombocytopenia     Obesity complicating pregnancy in second trimester 2019    Varicella     childhood       Review of Systems   Respiratory: Negative  Cardiovascular: Negative  Gastrointestinal: Negative for constipation and diarrhea  Genitourinary: Negative for difficulty urinating, pelvic pain, vaginal bleeding, vaginal discharge, itching or odor      O:  Blood pressure 118/78, temperature 98 4 °F (36 9 °C), temperature source Tympanic, height 5' 5 5" (1 664 m), weight 106 kg (234 lb 9 6 oz), currently breastfeeding  Patient appears well and is not in distress  Breasts are symmetrical without mass, tenderness, nipple discharge, skin changes or adenopathy  Abdomen is soft and nontender without masses  External genitals are normal without lesions or rashes  Urethra and urethral meatus are normal  Bladder is normal to palpation  Vagina is normal without discharge or bleeding  Cervix is normal without discharge or lesion, IUD strings seen   Uterus is normal, mobile, nontender without palpable mass  Adnexa are normal, nontender, without palpable mass  A:  Yearly exam      P:   Pap up to date  RTO one year for yearly exam or sooner as needed

## 2020-07-29 ENCOUNTER — OFFICE VISIT (OUTPATIENT)
Dept: NEUROLOGY | Facility: CLINIC | Age: 25
End: 2020-07-29
Payer: COMMERCIAL

## 2020-07-29 VITALS
DIASTOLIC BLOOD PRESSURE: 83 MMHG | TEMPERATURE: 99 F | BODY MASS INDEX: 37.93 KG/M2 | WEIGHT: 236 LBS | SYSTOLIC BLOOD PRESSURE: 134 MMHG | HEIGHT: 66 IN | HEART RATE: 81 BPM

## 2020-07-29 DIAGNOSIS — G08 THROMBOSIS OF SUPERIOR SAGITTAL SINUS: ICD-10-CM

## 2020-07-29 DIAGNOSIS — R56.9 SEIZURE (HCC): ICD-10-CM

## 2020-07-29 PROCEDURE — 99214 OFFICE O/P EST MOD 30 MIN: CPT | Performed by: PSYCHIATRY & NEUROLOGY

## 2020-07-29 RX ORDER — LEVETIRACETAM 750 MG/1
375 TABLET ORAL EVERY 12 HOURS SCHEDULED
Qty: 14 TABLET | Refills: 0
Start: 2020-07-29 | End: 2020-08-12

## 2020-07-29 NOTE — PATIENT INSTRUCTIONS
1  Decrease levetiracetam to half pill twice daily for 2 weeks and then stop  2  Schedule EEG to take place after stopping levetiracetam    3  Return as scheduled to see Jessica Treadwell

## 2020-07-29 NOTE — PROGRESS NOTES
[place] [address] [facility]  Follow Up Visit    Impression/Plan    Ms Dunia Coppola is a 21 y o  female with a single seizure on 12/6/2019 in the postpartum period due to venous sinus thrombosis  Her neurological exam is normal  Will wean levetiracetam and then repeat EEG  Would restart levetiracetam is there are clear epileptiform discharges  Discussed risk of recurrent seizure after single provoked event  Discussed reason for repeat EEG  Follow up with me with be as needed  Patient Instructions   1  Decrease levetiracetam to half pill twice daily for 2 weeks and then stop  2  Schedule EEG to take place after stopping levetiracetam    3  Return as scheduled to see Chalo Hebert and all orders for this visit:    Seizure [facility])  -     Ambulatory referral to Neurology  -     levETIRAcetam (KEPPRA) 750 mg tablet; Take 0 5 tablets (375 mg total) by mouth every 12 (twelve) hours for 14 days  -     EEG Sleep deprived; Future    Thrombosis of superior sagittal sinus  -     levETIRAcetam (KEPPRA) 750 mg tablet; Take 0 5 tablets (375 mg total) by mouth every 12 (twelve) hours for 14 days        Dani Barragan is returning to the [place] [address] [facility] for follow up  Initially seen by /neurology on 12/6/2019  Last seen by Araceli Gonzalez PA-C on 7/2/2020  Interval Events:   Seizures since last visit: None  Hospitalizations: no    Had single GTC seizure (witnessed, postictal, tongue bite) on 12/6/2019, 1 week postpartum, secondary cerebral sinus thrombosis  There was hypertensive emergency when she arrived in the ED  Repeat MRV shows resolution  No additional seizures       Current AEDs:  Levetiracetam 750 mg bid  Medication side effects: None  Medication adherence: Yes    Event/Seizure semiology:  Single GTC seizure    Special Features  Status epilepticus: no    Epilepsy Risk Factors:  None    Prior AEDs:  None    Prior Evaluation:  SD EEG 2/6/2020: normal  See EMR    History Reviewed: The following were reviewed and updated as appropriate: allergies, current medications, past family history, past medical history, past social history, past surgical history and problem list    Psychiatric History:  None      Objective    /83 (BP Location: Right arm, Patient Position: Sitting, Cuff Size: Standard)   Pulse 81   Temp 99 °F (37 2 °C)   Ht 5' 6"   Wt 107 kg (236 lb)   BMI 38 09 kg/m²      General Exam  No acute distress  Neurologic Exam  Mental Status:  Alert and oriented x 3  Language: normal fluency and comprehension  Cranial Nerves:  VFFTC  EOMI, no nystagums  Face symmetric  No dysarthria  Motor:  No drift  Strength 5/5 throughout  Coordination: Finger to nose intact  DTRs: Normal and symmetric (biceps, patella)  Gait: Normal casual gait  Review of Systems   Constitutional: Negative  Negative for appetite change and fever  HENT: Negative  Negative for hearing loss, tinnitus, trouble swallowing and voice change  Eyes: Negative  Negative for photophobia and pain  Respiratory: Negative  Negative for shortness of breath  Cardiovascular: Negative  Negative for palpitations  Gastrointestinal: Negative  Negative for nausea and vomiting  Endocrine: Negative  Negative for cold intolerance  Genitourinary: Negative  Negative for dysuria, frequency and urgency  Musculoskeletal: Negative  Negative for myalgias and neck pain  Skin: Negative  Negative for rash  Neurological: Positive for seizures  Negative for dizziness, tremors, syncope, facial asymmetry, speech difficulty, weakness, light-headedness, numbness and headaches  Hematological: Negative  Does not bruise/bleed easily  Psychiatric/Behavioral: Negative  Negative for confusion, hallucinations and sleep disturbance  Total time with patient today: 35 minutes   Greater than 50% of total time was spent with the patient and / or family counseling and / or coordination of care  A description of the counseling / coordination of care: discussed impression/plan in detail  See above

## 2020-08-14 ENCOUNTER — HOSPITAL ENCOUNTER (OUTPATIENT)
Dept: NEUROLOGY | Facility: CLINIC | Age: 25
Discharge: HOME | End: 2020-08-14
Payer: COMMERCIAL

## 2020-08-14 DIAGNOSIS — R56.9 SEIZURE (HCC): ICD-10-CM

## 2020-08-14 PROCEDURE — 95819 EEG AWAKE AND ASLEEP: CPT | Performed by: PSYCHIATRY & NEUROLOGY

## 2020-08-14 PROCEDURE — 95819 EEG AWAKE AND ASLEEP: CPT

## 2020-11-05 ENCOUNTER — LAB (OUTPATIENT)
Dept: LAB | Facility: MEDICAL CENTER | Age: 25
End: 2020-11-05
Payer: COMMERCIAL

## 2020-11-05 DIAGNOSIS — R30.0 BURNING WITH URINATION: Primary | ICD-10-CM

## 2020-11-05 DIAGNOSIS — R30.0 BURNING WITH URINATION: ICD-10-CM

## 2020-11-05 LAB
BACTERIA UR QL AUTO: ABNORMAL /HPF
BILIRUB UR QL STRIP: NEGATIVE
CLARITY UR: CLEAR
COLOR UR: YELLOW
GLUCOSE UR STRIP-MCNC: NEGATIVE MG/DL
HGB UR QL STRIP.AUTO: ABNORMAL
HYALINE CASTS #/AREA URNS LPF: ABNORMAL /LPF
KETONES UR STRIP-MCNC: NEGATIVE MG/DL
LEUKOCYTE ESTERASE UR QL STRIP: ABNORMAL
NITRITE UR QL STRIP: NEGATIVE
NON-SQ EPI CELLS URNS QL MICRO: ABNORMAL /HPF
PH UR STRIP.AUTO: 6.5 [PH]
PROT UR STRIP-MCNC: NEGATIVE MG/DL
RBC #/AREA URNS AUTO: ABNORMAL /HPF
SP GR UR STRIP.AUTO: 1.02 (ref 1–1.03)
UROBILINOGEN UR QL STRIP.AUTO: 0.2 E.U./DL
WBC #/AREA URNS AUTO: ABNORMAL /HPF

## 2020-11-05 PROCEDURE — 87086 URINE CULTURE/COLONY COUNT: CPT

## 2020-11-05 PROCEDURE — 81001 URINALYSIS AUTO W/SCOPE: CPT

## 2020-11-05 RX ORDER — SULFAMETHOXAZOLE AND TRIMETHOPRIM 800; 160 MG/1; MG/1
TABLET ORAL
Qty: 6 TABLET | Refills: 0 | Status: SHIPPED | OUTPATIENT
Start: 2020-11-05 | End: 2020-11-08

## 2020-11-06 LAB — BACTERIA UR CULT: NORMAL

## 2021-03-29 ENCOUNTER — TELEPHONE (OUTPATIENT)
Dept: NEUROLOGY | Facility: CLINIC | Age: 26
End: 2021-03-29

## 2021-03-29 NOTE — TELEPHONE ENCOUNTER
3/29/2021 Spoke with patient regarding her appointment scheduled on 4/1/2021 with Susanna Zavala  She needs to reschedule due to Insurance  We do not par with her current Insurance, she is trying to get Insurance that we do par with  Pt  Was transferred to team 4 voicemail

## 2021-06-23 ENCOUNTER — OFFICE VISIT (OUTPATIENT)
Dept: URGENT CARE | Facility: MEDICAL CENTER | Age: 26
End: 2021-06-23
Payer: COMMERCIAL

## 2021-06-23 VITALS
HEIGHT: 66 IN | RESPIRATION RATE: 18 BRPM | WEIGHT: 235 LBS | HEART RATE: 89 BPM | TEMPERATURE: 98.4 F | BODY MASS INDEX: 37.77 KG/M2 | OXYGEN SATURATION: 98 %

## 2021-06-23 DIAGNOSIS — R05.9 COUGH: Primary | ICD-10-CM

## 2021-06-23 PROCEDURE — 87635 SARS-COV-2 COVID-19 AMP PRB: CPT | Performed by: PHYSICIAN ASSISTANT

## 2021-06-23 PROCEDURE — 99283 EMERGENCY DEPT VISIT LOW MDM: CPT | Performed by: PHYSICIAN ASSISTANT

## 2021-06-23 PROCEDURE — G0382 LEV 3 HOSP TYPE B ED VISIT: HCPCS | Performed by: PHYSICIAN ASSISTANT

## 2021-06-23 NOTE — LETTER
June 23, 2021     Patient: Rad Hamilton   YOB: 1995   Date of Visit: 6/23/2021       To Whom it May Concern:    Jerrycole Ramsey was seen in my clinic on 6/23/2021  She may be excused until results available    If you have any questions or concerns, please don't hesitate to call  Sincerely,          Lucia Ortiz PA-C        CC: Noemi Mcgill

## 2021-06-23 NOTE — PROGRESS NOTES
[address] [facility]        NAME: Zaina Barragan is a 31 y o  female  : 1995    MRN: [id number]  DATE: 2021  TIME: 4:01 PM    Assessment and Plan   Cough [R05]  1  Cough  No[facility]Covid-19[facility]      COVID-19 swab performed due to symptoms  Advised to treat symptomatically and isolate until results available  Patient Instructions     May use OTC cough medication   isolate until results available  Follow up with PCP in 3-5 days  Proceed to  ER if symptoms worsen  Chief Complaint     Chief Complaint   Patient presents with    Cough     chest tightness that has gone away ,  cough x 4 days          History of Present Illness        Patient is a 69-year-old female who presents today with complaints of cough for the past 4 days  Did have chest tightness initially but this is gone  No fevers, chills, shortness of breath  No congestion or sore throat  She is not COVID-19 vaccinated  No known sick contacts  Review of Systems   Review of Systems   Constitutional: Negative for chills and fever  HENT: Negative for congestion, ear pain and sore throat  Respiratory: Positive for cough  Negative for shortness of breath  Cardiovascular: Negative for chest pain           Current Medications       Current Outpatient Medications:     aspirin (ECOTRIN LOW STRENGTH) 81 mg EC tablet, Take 1 tablet (81 mg total) by mouth daily, Disp: 30 tablet, Rfl:     acetaminophen (TYLENOL) 325 mg tablet, Take 2 tablets (650 mg total) by mouth every 4 (four) hours as needed for mild pain, Disp: 30 tablet, Rfl: 0    levETIRAcetam (KEPPRA) 750 mg tablet, Take 0 5 tablets (375 mg total) by mouth every 12 (twelve) hours for 14 days, Disp: 14 tablet, Rfl: 0    Current Allergies     Allergies as of 2021 - Reviewed 2021   Allergen Reaction Noted    Bee venom Anaphylaxis 2016    Tobramycin  05/10/2019            The following portions of the patient's history were reviewed and updated as appropriate: allergies, current medications, past family history, past medical history, past social history, past surgical history and problem list      Past Medical History:   Diagnosis Date     alloimmune thrombocytopenia     Obesity complicating pregnancy in second trimester 2019    Varicella     childhood       Past Surgical History:   Procedure Laterality Date     SECTION      CHOLECYSTECTOMY      2017    CHOLECYSTECTOMY LAPAROSCOPIC N/A 2017    Procedure: CHOLECYSTECTOMY LAPAROSCOPIC;  Surgeon: Flora Thompson MD;  [facility];  Service: General    ND  DELIVERY ONLY N/A 2019    Procedure: Babar Patel () REPEAT;  Surgeon: Sommer Chamberlain MD;  [facility] ;  Service: Obstetrics       Family History   Problem Relation Age of Onset    Arthritis Mother     Kidney disease Mother     Other Mother         thyroid, thalassemia, anemia    Diabetes Mother         type 2 insulin    Cancer Mother         lung, gist    Hypertension Father     No Known Problems Brother     Cancer Maternal Grandmother         lung    No Known Problems Son     Stroke Maternal Grandfather     Heart disease Maternal Grandfather         MI    No Known Problems Paternal Grandmother     Cancer Paternal Grandfather         brain    Diabetes Maternal Uncle         type 2 insulin         Medications have been verified  Objective   Pulse 89   Temp 98 4 °F (36 9 °C)   Resp 18   Ht 5' 6" (1 676 m)   Wt 107 kg (235 lb)   SpO2 98%   BMI 37 93 kg/m²        Physical Exam     Physical Exam  Constitutional:       General: She is not in acute distress  Appearance: Normal appearance  She is not ill-appearing  HENT:      Head: Normocephalic and atraumatic        Right Ear: Tympanic membrane and ear canal normal       Left Ear: Tympanic membrane and ear canal normal       Nose: Nose normal       Mouth/Throat: Mouth: Mucous membranes are moist       Pharynx: Oropharynx is clear  No posterior oropharyngeal erythema  Cardiovascular:      Rate and Rhythm: Normal rate and regular rhythm  Pulmonary:      Effort: Pulmonary effort is normal       Breath sounds: Normal breath sounds  Skin:     General: Skin is warm and dry  Neurological:      Mental Status: She is alert

## 2021-06-24 LAB — SARS-COV-2 RNA RESP QL NAA+PROBE: NEGATIVE

## 2021-07-19 ENCOUNTER — TELEPHONE (OUTPATIENT)
Dept: NEUROLOGY | Facility: CLINIC | Age: 26
End: 2021-07-19

## 2021-07-19 NOTE — TELEPHONE ENCOUNTER
[facility] FOR YOU-WE DO NOT PAR-7/19/2021 LMOM FOR PT TO CALL BACK REGARDING INS AND TO MAKE HER AWARE OF THE INSURANCES THAT [employer] DOES PAR WITH/JT

## 2021-08-10 ENCOUNTER — APPOINTMENT (EMERGENCY)
Dept: ULTRASOUND IMAGING | Facility: HOSPITAL | Age: 26
End: 2021-08-10
Payer: COMMERCIAL

## 2021-08-10 ENCOUNTER — APPOINTMENT (EMERGENCY)
Dept: CT IMAGING | Facility: HOSPITAL | Age: 26
End: 2021-08-10
Payer: COMMERCIAL

## 2021-08-10 ENCOUNTER — HOSPITAL ENCOUNTER (EMERGENCY)
Facility: HOSPITAL | Age: 26
Discharge: HOME | End: 2021-08-10
Attending: EMERGENCY MEDICINE
Payer: COMMERCIAL

## 2021-08-10 ENCOUNTER — OFFICE VISIT (OUTPATIENT)
Dept: URGENT CARE | Facility: MEDICAL CENTER | Age: 26
End: 2021-08-10
Payer: COMMERCIAL

## 2021-08-10 VITALS
OXYGEN SATURATION: 98 % | HEIGHT: 65 IN | TEMPERATURE: 98.2 F | BODY MASS INDEX: 39.15 KG/M2 | DIASTOLIC BLOOD PRESSURE: 74 MMHG | HEART RATE: 88 BPM | RESPIRATION RATE: 18 BRPM | SYSTOLIC BLOOD PRESSURE: 125 MMHG | WEIGHT: 235 LBS

## 2021-08-10 VITALS
OXYGEN SATURATION: 96 % | HEIGHT: 66 IN | HEART RATE: 98 BPM | BODY MASS INDEX: 38.09 KG/M2 | WEIGHT: 237 LBS | DIASTOLIC BLOOD PRESSURE: 78 MMHG | SYSTOLIC BLOOD PRESSURE: 138 MMHG | RESPIRATION RATE: 18 BRPM | TEMPERATURE: 98.6 F

## 2021-08-10 DIAGNOSIS — R22.41 MASS OF RIGHT THIGH: Primary | ICD-10-CM

## 2021-08-10 DIAGNOSIS — M79.89 SWELLING OF THIGH: Primary | ICD-10-CM

## 2021-08-10 LAB
ANION GAP SERPL CALCULATED.3IONS-SCNC: 10 MMOL/L (ref 4–13)
BASOPHILS # BLD AUTO: 0.04 THOUSANDS/ΜL (ref 0–0.1)
BASOPHILS NFR BLD AUTO: 1 % (ref 0–1)
BUN SERPL-MCNC: 15 MG/DL (ref 5–25)
CALCIUM SERPL-MCNC: 9 MG/DL (ref 8.3–10.1)
CHLORIDE SERPL-SCNC: 103 MMOL/L (ref 100–108)
CO2 SERPL-SCNC: 26 MMOL/L (ref 21–32)
CREAT SERPL-MCNC: 0.77 MG/DL (ref 0.6–1.3)
EOSINOPHIL # BLD AUTO: 0.27 THOUSAND/ΜL (ref 0–0.61)
EOSINOPHIL NFR BLD AUTO: 3 % (ref 0–6)
ERYTHROCYTE [DISTWIDTH] IN BLOOD BY AUTOMATED COUNT: 12.9 % (ref 11.6–15.1)
EXT PREG TEST URINE: NEGATIVE
EXT. CONTROL ED NAV: NORMAL
GFR SERPL CREATININE-BSD FRML MDRD: 107 ML/MIN/1.73SQ M
GLUCOSE SERPL-MCNC: 94 MG/DL (ref 65–140)
HCT VFR BLD AUTO: 40.7 % (ref 34.8–46.1)
HGB BLD-MCNC: 13.3 G/DL (ref 11.5–15.4)
IMM GRANULOCYTES # BLD AUTO: 0.04 THOUSAND/UL (ref 0–0.2)
IMM GRANULOCYTES NFR BLD AUTO: 1 % (ref 0–2)
INR PPP: 0.98 (ref 0.84–1.19)
LYMPHOCYTES # BLD AUTO: 2.17 THOUSANDS/ΜL (ref 0.6–4.47)
LYMPHOCYTES NFR BLD AUTO: 26 % (ref 14–44)
MCH RBC QN AUTO: 27.7 PG (ref 26.8–34.3)
MCHC RBC AUTO-ENTMCNC: 32.7 G/DL (ref 31.4–37.4)
MCV RBC AUTO: 85 FL (ref 82–98)
MONOCYTES # BLD AUTO: 0.44 THOUSAND/ΜL (ref 0.17–1.22)
MONOCYTES NFR BLD AUTO: 5 % (ref 4–12)
NEUTROPHILS # BLD AUTO: 5.4 THOUSANDS/ΜL (ref 1.85–7.62)
NEUTS SEG NFR BLD AUTO: 64 % (ref 43–75)
NRBC BLD AUTO-RTO: 0 /100 WBCS
PLATELET # BLD AUTO: 342 THOUSANDS/UL (ref 149–390)
PMV BLD AUTO: 9.4 FL (ref 8.9–12.7)
POTASSIUM SERPL-SCNC: 3.6 MMOL/L (ref 3.5–5.3)
PROTHROMBIN TIME: 13.1 SECONDS (ref 11.6–14.5)
RBC # BLD AUTO: 4.81 MILLION/UL (ref 3.81–5.12)
SODIUM SERPL-SCNC: 139 MMOL/L (ref 136–145)
WBC # BLD AUTO: 8.36 THOUSAND/UL (ref 4.31–10.16)

## 2021-08-10 PROCEDURE — 36415 COLL VENOUS BLD VENIPUNCTURE: CPT | Performed by: EMERGENCY MEDICINE

## 2021-08-10 PROCEDURE — 99283 EMERGENCY DEPT VISIT LOW MDM: CPT | Performed by: PHYSICIAN ASSISTANT

## 2021-08-10 PROCEDURE — 99284 EMERGENCY DEPT VISIT MOD MDM: CPT | Performed by: EMERGENCY MEDICINE

## 2021-08-10 PROCEDURE — 85610 PROTHROMBIN TIME: CPT | Performed by: EMERGENCY MEDICINE

## 2021-08-10 PROCEDURE — 80048 BASIC METABOLIC PNL TOTAL CA: CPT | Performed by: EMERGENCY MEDICINE

## 2021-08-10 PROCEDURE — 85025 COMPLETE CBC W/AUTO DIFF WBC: CPT | Performed by: EMERGENCY MEDICINE

## 2021-08-10 PROCEDURE — 73700 CT LOWER EXTREMITY W/O DYE: CPT

## 2021-08-10 PROCEDURE — G0382 LEV 3 HOSP TYPE B ED VISIT: HCPCS | Performed by: PHYSICIAN ASSISTANT

## 2021-08-10 PROCEDURE — 81025 URINE PREGNANCY TEST: CPT | Performed by: EMERGENCY MEDICINE

## 2021-08-10 PROCEDURE — 99284 EMERGENCY DEPT VISIT MOD MDM: CPT

## 2021-08-10 PROCEDURE — 93971 EXTREMITY STUDY: CPT

## 2021-08-11 PROCEDURE — 93971 EXTREMITY STUDY: CPT | Performed by: SURGERY

## 2021-08-11 NOTE — DISCHARGE INSTRUCTIONS
You will likely need an MRI of your thigh with contrast dye for further evaluation of the mass in your leg  This should be ordered by your primary care doctor  Once the MRI is obtained, you may require a referral to a surgeon to biopsy your mass to rule-out cancer

## 2021-08-11 NOTE — PROGRESS NOTES
[employer] [facility]        NAME: Rosana Rogers is a 31 y o  female  : 1995    MRN: [id number]  DATE: August 10, 2021  TIME: 8:23 PM    Assessment and Plan   Swelling of thigh [M79 89]  1  Swelling of thigh         There is large mass/area of swelling on the right upper thigh  Patient states this has been there for a while but pain has worsened  She does need ultrasound and has no primary care  I advised her if she cannot get a primary care she needs to go to the ER as we cannot perform ultrasound in the urgent care  She does have history of clotting disorder and has been on aspirin in the past     Patient Instructions     Follow up with PCP in 3-5 days  Proceed to  ER if symptoms worsen  Chief Complaint     Chief Complaint   Patient presents with    Leg Pain     pain behind the right knee x 1 month          History of Present Illness         Patient is a 65-year-old female who presents today with swelling of her right upper thigh for about the past month that is worsening  She reports mild pain  She was scheduled to get an ultrasound done a few months ago by her PCP but never went and now lost insurance  She tells me she had a clotting disorder for which she was on Coumadin at one point and she now takes aspirin daily  No chest pain or shortness of breath  No color change  No injury  Review of Systems   Review of Systems   Constitutional: Negative for chills and fever  Respiratory: Negative for shortness of breath  Cardiovascular: Positive for leg swelling  Negative for chest pain  Musculoskeletal: Positive for myalgias  Skin: Negative for color change           Current Medications       Current Outpatient Medications:     acetaminophen (TYLENOL) 325 mg tablet, Take 2 tablets (650 mg total) by mouth every 4 (four) hours as needed for mild pain, Disp: 30 tablet, Rfl: 0    aspirin (ECOTRIN LOW STRENGTH) 81 mg EC tablet, Take 1 tablet (81 mg total) by mouth daily, Disp: 30 tablet, Rfl:     levETIRAcetam (KEPPRA) 750 mg tablet, Take 0 5 tablets (375 mg total) by mouth every 12 (twelve) hours for 14 days, Disp: 14 tablet, Rfl: 0    Current Allergies     Allergies as of 08/10/2021 - Reviewed 08/10/2021   Allergen Reaction Noted    Bee venom Anaphylaxis 2016    Tobramycin  05/10/2019            The following portions of the patient's history were reviewed and updated as appropriate: allergies, current medications, past family history, past medical history, past social history, past surgical history and problem list      Past Medical History:   Diagnosis Date     alloimmune thrombocytopenia     Obesity complicating pregnancy in second trimester 2019    Varicella     childhood       Past Surgical History:   Procedure Laterality Date     SECTION      CHOLECYSTECTOMY          CHOLECYSTECTOMY LAPAROSCOPIC N/A 2017    Procedure: CHOLECYSTECTOMY LAPAROSCOPIC;  Surgeon: Phoebe Smith MD;  [facility];  Service: General    IN  DELIVERY ONLY N/A 2019    Procedure: Lan Apolonia () REPEAT;  Surgeon: Radha Borja MD;  Location: [facility];  Service: Obstetrics       Family History   Problem Relation Age of Onset    Arthritis Mother     Kidney disease Mother     Other Mother         thyroid, thalassemia, anemia    Diabetes Mother         type 2 insulin    Cancer Mother         lung, gist    Hypertension Father     No Known Problems Brother     Cancer Maternal Grandmother         lung    No Known Problems Son     Stroke Maternal Grandfather     Heart disease Maternal Grandfather         MI    No Known Problems Paternal Grandmother     Cancer Paternal Grandfather         brain    Diabetes Maternal Uncle         type 2 insulin         Medications have been verified          Objective   /78   Pulse 98   Temp 98 6 °F (37 °C)   Resp 18   Ht 5' 6" (1 676 m)   Wt 108 kg (237 lb)   SpO2 96%   BMI 38 25 kg/m²        Physical Exam     Physical Exam  Constitutional:       General: She is not in acute distress  Appearance: Normal appearance  She is not ill-appearing  Cardiovascular:      Rate and Rhythm: Normal rate and regular rhythm  Pulmonary:      Effort: Pulmonary effort is normal       Breath sounds: Normal breath sounds  Musculoskeletal:         General: Swelling and tenderness present  Normal range of motion  Right lower leg: Swelling and tenderness present  Legs:    Skin:     General: Skin is warm and dry  Neurological:      Mental Status: She is alert

## 2021-08-11 NOTE — ED PROVIDER NOTES
History  Chief Complaint   Patient presents with    Leg Pain     pt reports hx of hemangioma on her R thigh for a few years, reports recently that it is growing in size and becoming more painful  was referred to the ED by PCP for US  HPI     50-year-old female with history of aloimmune thrombocytopenia during pregnancy for which she was formerly on coumadin, now on 81 mg of ASA daily per hematology recs, presenting for evaluation of an area of swelling to her right posterior thigh  She states that the swelling is been present for the last few years but has gradually grown in size  She presents for evaluation today because of discomfort in the area with prolonged standing or exercise  Denies trauma to the area or overlying redness  She went to an urgent care earlier today for evaluation and was sent here for ultrasound to rule out DVT  Denies pain or swelling in her calves  No chest pain or shortness of breath  No fever or chills  Prior to Admission Medications   Prescriptions Last Dose Informant Patient Reported?  Taking?   acetaminophen (TYLENOL) 325 mg tablet  Self No No   Sig: Take 2 tablets (650 mg total) by mouth every 4 (four) hours as needed for mild pain   aspirin (ECOTRIN LOW STRENGTH) 81 mg EC tablet  Self No No   Sig: Take 1 tablet (81 mg total) by mouth daily   levETIRAcetam (KEPPRA) 750 mg tablet   No No   Sig: Take 0 5 tablets (375 mg total) by mouth every 12 (twelve) hours for 14 days      Facility-Administered Medications: None       Past Medical History:   Diagnosis Date     alloimmune thrombocytopenia     Obesity complicating pregnancy in second trimester 2019    Varicella     childhood       Past Surgical History:   Procedure Laterality Date     SECTION      CHOLECYSTECTOMY          CHOLECYSTECTOMY LAPAROSCOPIC N/A 2017    Procedure: CHOLECYSTECTOMY LAPAROSCOPIC;  Surgeon: Gary Gonzalez MD;  [facility];  Service: General    AK  DELIVERY ONLY N/A 2019    Procedure: Lorena Conn () REPEAT;  Surgeon: Checo Ng MD;  [facility];  Service: Obstetrics       Family History   Problem Relation Age of Onset    Arthritis Mother     Kidney disease Mother     Other Mother         thyroid, thalassemia, anemia    Diabetes Mother         type 2 insulin    Cancer Mother         lung, gist    Hypertension Father     No Known Problems Brother     Cancer Maternal Grandmother         lung    No Known Problems Son     Stroke Maternal Grandfather     Heart disease Maternal Grandfather         MI    No Known Problems Paternal Grandmother     Cancer Paternal Grandfather         brain    Diabetes Maternal Uncle         type 2 insulin     I have reviewed and agree with the history as documented  E-Cigarette/Vaping    E-Cigarette Use Never User      E-Cigarette/Vaping Substances    Nicotine No     THC No     CBD No     Flavoring No     Other No     Unknown No      Social History     Tobacco Use    Smoking status: Former Smoker     Packs/day: 0 50     Types: Cigarettes     Quit date: 2019     Years since quittin 7    Smokeless tobacco: Never Used    Tobacco comment: 1-2   Vaping Use    Vaping Use: Never used   Substance Use Topics    Alcohol use: Not Currently    Drug use: No       Review of Systems   Constitutional: Negative for chills and fever  HENT: Negative for congestion  Eyes: Negative for visual disturbance  Respiratory: Negative for cough and shortness of breath  Cardiovascular: Positive for leg swelling (R upper leg)  Negative for chest pain  Gastrointestinal: Negative for abdominal pain, diarrhea, nausea and vomiting  Genitourinary: Negative for flank pain  Musculoskeletal: Positive for myalgias (pain and swelling in the R thigh)  Negative for arthralgias, back pain, neck pain and neck stiffness  Skin: Negative for rash     Neurological: Negative for weakness, numbness and headaches  Psychiatric/Behavioral: Negative for agitation, behavioral problems and confusion  Physical Exam  Physical Exam  Constitutional:       General: She is not in acute distress  Appearance: She is well-developed  She is not diaphoretic  HENT:      Head: Normocephalic and atraumatic  Right Ear: External ear normal       Left Ear: External ear normal       Nose: Nose normal    Eyes:      Conjunctiva/sclera: Conjunctivae normal    Cardiovascular:      Rate and Rhythm: Normal rate and regular rhythm  Pulses: Normal pulses  Heart sounds: Normal heart sounds  No murmur heard  No friction rub  No gallop  Pulmonary:      Effort: Pulmonary effort is normal  No respiratory distress  Breath sounds: Normal breath sounds  No wheezing or rales  Abdominal:      General: Bowel sounds are normal  There is no distension  Palpations: Abdomen is soft  Tenderness: There is no abdominal tenderness  There is no guarding  Musculoskeletal:         General: No deformity  Normal range of motion  Cervical back: Normal range of motion and neck supple  Comments: Palpable swelling to the musculature of the right posterior thigh compared to the left without tenderness to palpation  No overlying erythema, fluctuance, or induration  Compartments of the thigh are soft  No swelling to the calf or associated tenderness or overlying erythema  Full range of motion to the joints of the right lower extremity without pain or swelling  Skin:     General: Skin is warm and dry  Neurological:      Mental Status: She is alert and oriented to person, place, and time  Motor: No abnormal muscle tone        Comments: Intact sensation to light touch in the peripheral nerve distributions of the right lower extremity   Psychiatric:         Mood and Affect: Mood normal          Vital Signs  ED Triage Vitals [08/10/21 2126]   Temperature Pulse Respirations Blood Pressure SpO2   98 2 °F (36 8 °C) 88 18 125/74 98 %      Temp Source Heart Rate Source Patient Position - Orthostatic VS BP Location FiO2 (%)   Oral Monitor Lying Right arm --      Pain Score       4           Vitals:    08/10/21 2126   BP: 125/74   Pulse: 88   Patient Position - Orthostatic VS: Lying         Visual Acuity      ED Medications  Medications - No data to display    Diagnostic Studies  Results Reviewed     Procedure Component Value Units Date/Time    Basic metabolic panel [[id number]] Collected: 08/10/21 2211    Lab Status: Final result Specimen: Blood from Arm, Right Updated: 08/10/21 2243     Sodium 139 mmol/L      Potassium 3 6 mmol/L      Chloride 103 mmol/L      CO2 26 mmol/L      ANION GAP 10 mmol/L      BUN 15 mg/dL      Creatinine 0 77 mg/dL      Glucose 94 mg/dL      Calcium 9 0 mg/dL      eGFR 107 ml/min/1 73sq m     Narrative:      [place] guidelines for Chronic Kidney Disease (CKD):     Stage 1 with normal or high GFR (GFR > 90 mL/min/1 73 square meters)    Stage 2 Mild CKD (GFR = 60-89 mL/min/1 73 square meters)    Stage 3A Moderate CKD (GFR = 45-59 mL/min/1 73 square meters)    Stage 3B Moderate CKD (GFR = 30-44 mL/min/1 73 square meters)    Stage 4 Severe CKD (GFR = 15-29 mL/min/1 73 square meters)    Stage 5 End Stage CKD (GFR <15 mL/min/1 73 square meters)  Note: GFR calculation is accurate only with a steady state creatinine    Protime-INR [[id number]]  (Normal) Collected: 08/10/21 2211    Lab Status: Final result Specimen: Blood from Arm, Right Updated: 08/10/21 2237     Protime 13 1 seconds      INR 0 98    CBC and differential [[id number]] Collected: 08/10/21 2211    Lab Status: Final result Specimen: Blood from Arm, Right Updated: 08/10/21 2222     WBC 8 36 Thousand/uL      RBC 4 81 Million/uL      Hemoglobin 13 3 g/dL      Hematocrit 40 7 %      MCV 85 fL      MCH 27 7 pg      MCHC 32 7 g/dL      RDW 12 9 %      MPV 9 4 fL      Platelets 920 Thousands/uL      nRBC 0 /100 WBCs      Neutrophils Relative 64 %      Immat GRANS % 1 %      Lymphocytes Relative 26 %      Monocytes Relative 5 %      Eosinophils Relative 3 %      Basophils Relative 1 %      Neutrophils Absolute 5 40 Thousands/µL      Immature Grans Absolute 0 04 Thousand/uL      Lymphocytes Absolute 2 17 Thousands/µL      Monocytes Absolute 0 44 Thousand/µL      Eosinophils Absolute 0 27 Thousand/µL      Basophils Absolute 0 04 Thousands/µL     POCT pregnancy, urine [[id number]]  (Normal) Resulted: 08/10/21 2215    Lab Status: Final result Updated: 08/10/21 2215     EXT PREG TEST UR (Ref: Negative) negative     Control valid                 CT lower extremity wo contrast right   Final Result by Isabela Bryant DO (08/10 2330)      Large fluid collection versus mass within the posterior muscles of the mid right femur  Further evaluation with a MRI with contrast is recommended  I personally discussed this study with Deirdre Caro on 8/10/2021 at 11:25 PM                   Workstation performed: [id number]         VAS lower limb venous duplex study, unilateral/limited    (Results Pending)              Procedures  Procedures         ED Course                                           MDM  Number of Diagnoses or Management Options  Mass of right thigh: new and requires workup  Diagnosis management comments: Well appearing  Afebrile and hemodynamically stable  Patient has palpable swelling to the posterior aspect of the right thigh compared with the left  Ultrasound obtained that is negative for DVT but reveals a complex structure within the muscle  This was followed up with CT scan that shows a large fluid collection versus mass within the posterior muscles of the mid right thigh, with recommendation for further evaluation with a contrast-enhanced MRI  Patient is neurovascularly intact to the right lower extremity    MRI not emergently indicated as mass has been present for greater than a year  Recommend follow-up with her PCP for MRI, then likely referral to surgery for biopsy pending MRI results  Patient counseled that this workup should be performed expeditiously due to possible concern for underlying malignancy  Return precautions discussed  Amount and/or Complexity of Data Reviewed  Clinical lab tests: ordered and reviewed  Tests in the radiology section of CPT®: ordered and reviewed  Independent visualization of images, tracings, or specimens: yes    Patient Progress  Patient progress: stable           Disposition  Final diagnoses: Mass of right thigh     Time reflects when diagnosis was documented in both MDM as applicable and the Disposition within this note     Time User Action Codes Description Comment    8/10/2021 11:35 PM Jean-Paul Amaro Add [R22 41] Mass of right thigh       ED Disposition     ED Disposition Condition Date/Time Comment    Discharge Stable Tue Aug 10, 2021 11:35 PM Alfonzo Shukla discharge to home/self care  Follow-up Information     Follow up With Specialties Details Why Contact Info Additional [facility] Call in 1 day Please call to establish care with a primary care doctor for further evaluation of your thigh mass, likely to include an MRI with contrast  [address] [id number]  [id number]  , [facility] Schedule an appointment as soon as possible for a visit  For further evaluation of the mass in your right thigh   [address] [place] [id number]  [facility], [address] [place] [id number] [address], [place], [place], [address]    [facility] Emergency Department Emergency Medicine  As we discussed, return to the Emergency Department immediately for severe pain or redness to your thigh, change in color of your leg, or numbness or weakness in your leg  [address] Emergency Department, [address], [place], [place], [address]          Patient's Medications   Discharge Prescriptions    No medications on file     No discharge procedures on file      PDMP Review     None          ED Provider  Electronically Signed by           Linda Lawson MD  08/10/21 7998

## 2022-09-13 NOTE — PROGRESS NOTES
IVIG stopped when patient began complaining of itching and rash  IV NS opened wide  At the time of discontinuing IVIG There was 1000ml remaining in the bag, Half of the treatment had been given  The rate was 480 ml/hr (the max titration rate) at the time of stopping the infusion 
Pt started to c/o itching on left neck, flank right AC and urticaria noted in these areas  Approximately 1/2 dose of infused  IGG stopped,  NSS wide open VS 96 9-84-/65  Sat 98% RA  Symptoms quickly progressed to tightness in throat and cough  Pt states" this is how I feel when stung by a bee and I'm allergic"  Dr Luis Manuel Whitmore notified and medical emergency called   Benadryl 25mg IVP given per VO from Dr Luis Manuel Whitmore and pt transported to ED via stretcher
Pt would like to change day of weekly treatments  She prefers a Monday  Spoke with RN in Dr Eliza Richards office about how to move the day to Monday  They will return call likely tomorrow with a plan for changing her schedule  She will contact Earl White who is aware of the situation 
no blurred vision/no confusion/no fever/no loss of consciousness/no nausea/no numbness/no vomiting/no weakness/no change in level of consciousness

## (undated) DEVICE — SUT VICRYL 0 UR-6 27 IN J603H

## (undated) DEVICE — REM POLYHESIVE ADULT PATIENT RETURN ELECTRODE: Brand: VALLEYLAB

## (undated) DEVICE — ALLENTOWN LAP CHOLE APP PACK: Brand: CARDINAL HEALTH

## (undated) DEVICE — PACK C-SECTION PBDS

## (undated) DEVICE — GLOVE PI ULTRA TOUCH SZ 6

## (undated) DEVICE — NEEDLE 22 G X 1 1/2 SAFETY

## (undated) DEVICE — LIGHT HANDLE COVER SLEEVE DISP BLUE STELLAR

## (undated) DEVICE — ADHESIVE SKN CLSR HISTOACRYL FLEX 0.5ML LF

## (undated) DEVICE — HARMONIC ACE 5MM DIAMETER SHEARS 36CM SHAFT LENGTH + ADAPTIVE TISSUE TECHNOLOGY FOR USE WITH GENERATOR G11: Brand: HARMONIC ACE

## (undated) DEVICE — TELFA ADHESIVE ISLAND DRESSING: Brand: TELFA

## (undated) DEVICE — CHLORAPREP HI-LITE 26ML ORANGE

## (undated) DEVICE — SUT MONOCRYL 4-0 PS-2 27 IN Y426H

## (undated) DEVICE — GLOVE SRG BIOGEL ORTHOPEDIC 8

## (undated) DEVICE — GLOVE INDICATOR UNDERGLOVE SZ 6 BLUE

## (undated) DEVICE — TOWEL SET X-RAY

## (undated) DEVICE — ENDOPATH XCEL BLADELESS TROCARS WITH STABILITY SLEEVES: Brand: ENDOPATH XCEL

## (undated) DEVICE — SUT PLAIN 2-0 CTX 27 IN 872H

## (undated) DEVICE — ABDOMINAL PAD: Brand: DERMACEA

## (undated) DEVICE — INTENDED FOR TISSUE SEPARATION, AND OTHER PROCEDURES THAT REQUIRE A SHARP SURGICAL BLADE TO PUNCTURE OR CUT.: Brand: BARD-PARKER SAFETY BLADES SIZE 11, STERILE

## (undated) DEVICE — SKIN MARKER DUAL TIP WITH RULER CAP, FLEXIBLE RULER AND LABELS: Brand: DEVON

## (undated) DEVICE — LIGAMAX 5 MM ENDOSCOPIC MULTIPLE CLIP APPLIER: Brand: LIGAMAX

## (undated) DEVICE — VIAL DECANTER

## (undated) DEVICE — SUT VICRYL 4-0 KS 27 IN J662H

## (undated) DEVICE — COTTON TIP APPLICTOR 2 PK

## (undated) DEVICE — ADHESIVE SKIN HIGH VISCOSITY EXOFIN 1ML

## (undated) DEVICE — SUT VICRYL 0 CTX 36 IN J978H

## (undated) DEVICE — Device

## (undated) DEVICE — IRRIG ENDO FLO TUBING

## (undated) DEVICE — GAUZE SPONGES,16 PLY: Brand: CURITY

## (undated) DEVICE — ENDOPATH XCEL UNIVERSAL TROCAR STABLILITY SLEEVES: Brand: ENDOPATH XCEL

## (undated) DEVICE — SUT VICRYL 0 CT-1 36 IN J946H